# Patient Record
Sex: FEMALE | Race: BLACK OR AFRICAN AMERICAN | NOT HISPANIC OR LATINO | Employment: FULL TIME | URBAN - METROPOLITAN AREA
[De-identification: names, ages, dates, MRNs, and addresses within clinical notes are randomized per-mention and may not be internally consistent; named-entity substitution may affect disease eponyms.]

---

## 2017-02-13 ENCOUNTER — OFFICE VISIT - HEALTHEAST (OUTPATIENT)
Dept: FAMILY MEDICINE | Facility: CLINIC | Age: 57
End: 2017-02-13

## 2017-02-13 DIAGNOSIS — Z01.818 PREOP EXAMINATION: ICD-10-CM

## 2017-02-13 DIAGNOSIS — I10 HTN (HYPERTENSION): ICD-10-CM

## 2017-02-13 ASSESSMENT — MIFFLIN-ST. JEOR: SCORE: 1385.08

## 2017-03-17 ENCOUNTER — COMMUNICATION - HEALTHEAST (OUTPATIENT)
Dept: FAMILY MEDICINE | Facility: CLINIC | Age: 57
End: 2017-03-17

## 2017-05-06 ENCOUNTER — COMMUNICATION - HEALTHEAST (OUTPATIENT)
Dept: FAMILY MEDICINE | Facility: CLINIC | Age: 57
End: 2017-05-06

## 2017-05-06 DIAGNOSIS — I10 HYPERTENSION, ESSENTIAL, BENIGN: ICD-10-CM

## 2017-06-06 ENCOUNTER — OFFICE VISIT - HEALTHEAST (OUTPATIENT)
Dept: FAMILY MEDICINE | Facility: CLINIC | Age: 57
End: 2017-06-06

## 2017-06-06 DIAGNOSIS — S00.452A FOREIGN BODY IN EAR LOBE, LEFT, INITIAL ENCOUNTER: ICD-10-CM

## 2017-06-06 DIAGNOSIS — T65.91XA: ICD-10-CM

## 2017-06-06 DIAGNOSIS — I10 HYPERTENSION, ESSENTIAL, BENIGN: ICD-10-CM

## 2017-06-12 ENCOUNTER — OFFICE VISIT - HEALTHEAST (OUTPATIENT)
Dept: FAMILY MEDICINE | Facility: CLINIC | Age: 57
End: 2017-06-12

## 2017-06-12 DIAGNOSIS — T16.2XXD: ICD-10-CM

## 2017-09-13 ENCOUNTER — COMMUNICATION - HEALTHEAST (OUTPATIENT)
Dept: FAMILY MEDICINE | Facility: CLINIC | Age: 57
End: 2017-09-13

## 2017-09-25 ENCOUNTER — OFFICE VISIT - HEALTHEAST (OUTPATIENT)
Dept: FAMILY MEDICINE | Facility: CLINIC | Age: 57
End: 2017-09-25

## 2017-09-25 DIAGNOSIS — Z12.11 COLON CANCER SCREENING: ICD-10-CM

## 2017-09-25 DIAGNOSIS — F32.A DEPRESSION: ICD-10-CM

## 2017-09-25 DIAGNOSIS — Z23 NEED FOR IMMUNIZATION AGAINST INFLUENZA: ICD-10-CM

## 2017-09-25 DIAGNOSIS — K59.00 CONSTIPATION, UNSPECIFIED CONSTIPATION TYPE: ICD-10-CM

## 2017-10-30 ENCOUNTER — COMMUNICATION - HEALTHEAST (OUTPATIENT)
Dept: FAMILY MEDICINE | Facility: CLINIC | Age: 57
End: 2017-10-30

## 2017-11-14 ENCOUNTER — COMMUNICATION - HEALTHEAST (OUTPATIENT)
Dept: FAMILY MEDICINE | Facility: CLINIC | Age: 57
End: 2017-11-14

## 2017-12-05 ENCOUNTER — RECORDS - HEALTHEAST (OUTPATIENT)
Dept: ADMINISTRATIVE | Facility: OTHER | Age: 57
End: 2017-12-05

## 2017-12-17 ENCOUNTER — RECORDS - HEALTHEAST (OUTPATIENT)
Dept: ADMINISTRATIVE | Facility: OTHER | Age: 57
End: 2017-12-17

## 2018-01-18 ENCOUNTER — RECORDS - HEALTHEAST (OUTPATIENT)
Dept: ADMINISTRATIVE | Facility: OTHER | Age: 58
End: 2018-01-18

## 2018-01-19 ENCOUNTER — OFFICE VISIT - HEALTHEAST (OUTPATIENT)
Dept: FAMILY MEDICINE | Facility: CLINIC | Age: 58
End: 2018-01-19

## 2018-01-19 DIAGNOSIS — K60.2 FISSURE IN ANO: ICD-10-CM

## 2018-01-25 ENCOUNTER — COMMUNICATION - HEALTHEAST (OUTPATIENT)
Dept: FAMILY MEDICINE | Facility: CLINIC | Age: 58
End: 2018-01-25

## 2018-02-06 ENCOUNTER — RECORDS - HEALTHEAST (OUTPATIENT)
Dept: ADMINISTRATIVE | Facility: OTHER | Age: 58
End: 2018-02-06

## 2018-02-20 ENCOUNTER — RECORDS - HEALTHEAST (OUTPATIENT)
Dept: ADMINISTRATIVE | Facility: OTHER | Age: 58
End: 2018-02-20

## 2018-03-07 ENCOUNTER — COMMUNICATION - HEALTHEAST (OUTPATIENT)
Dept: FAMILY MEDICINE | Facility: CLINIC | Age: 58
End: 2018-03-07

## 2018-04-05 ENCOUNTER — COMMUNICATION - HEALTHEAST (OUTPATIENT)
Dept: SCHEDULING | Facility: CLINIC | Age: 58
End: 2018-04-05

## 2018-04-05 ENCOUNTER — OFFICE VISIT - HEALTHEAST (OUTPATIENT)
Dept: FAMILY MEDICINE | Facility: CLINIC | Age: 58
End: 2018-04-05

## 2018-04-05 DIAGNOSIS — F41.9 ANXIETY: ICD-10-CM

## 2018-04-05 DIAGNOSIS — F32.A DEPRESSION: ICD-10-CM

## 2018-04-05 DIAGNOSIS — I10 HYPERTENSION, ESSENTIAL, BENIGN: ICD-10-CM

## 2018-11-27 ENCOUNTER — OFFICE VISIT - HEALTHEAST (OUTPATIENT)
Dept: FAMILY MEDICINE | Facility: CLINIC | Age: 58
End: 2018-11-27

## 2018-11-27 DIAGNOSIS — M25.552 PAIN OF BOTH HIP JOINTS: ICD-10-CM

## 2018-11-27 DIAGNOSIS — M25.551 PAIN OF BOTH HIP JOINTS: ICD-10-CM

## 2018-11-27 DIAGNOSIS — I10 ESSENTIAL HYPERTENSION: ICD-10-CM

## 2018-11-27 DIAGNOSIS — M54.9 CHRONIC MIDLINE BACK PAIN, UNSPECIFIED BACK LOCATION: ICD-10-CM

## 2018-11-27 DIAGNOSIS — E87.6 HYPOKALEMIA: ICD-10-CM

## 2018-11-27 DIAGNOSIS — G89.29 CHRONIC MIDLINE BACK PAIN, UNSPECIFIED BACK LOCATION: ICD-10-CM

## 2018-11-27 DIAGNOSIS — S89.90XA KNEE INJURY, UNSPECIFIED LATERALITY, INITIAL ENCOUNTER: ICD-10-CM

## 2018-11-27 LAB
ANION GAP SERPL CALCULATED.3IONS-SCNC: 7 MMOL/L (ref 5–18)
BUN SERPL-MCNC: 13 MG/DL (ref 8–22)
CALCIUM SERPL-MCNC: 9.3 MG/DL (ref 8.5–10.5)
CHLORIDE BLD-SCNC: 108 MMOL/L (ref 98–107)
CO2 SERPL-SCNC: 26 MMOL/L (ref 22–31)
CREAT SERPL-MCNC: 0.68 MG/DL (ref 0.6–1.1)
GFR SERPL CREATININE-BSD FRML MDRD: >60 ML/MIN/1.73M2
GLUCOSE BLD-MCNC: 80 MG/DL (ref 70–125)
POTASSIUM BLD-SCNC: 3.5 MMOL/L (ref 3.5–5)
SODIUM SERPL-SCNC: 141 MMOL/L (ref 136–145)

## 2018-12-03 ENCOUNTER — RECORDS - HEALTHEAST (OUTPATIENT)
Dept: ADMINISTRATIVE | Facility: OTHER | Age: 58
End: 2018-12-03

## 2018-12-06 ENCOUNTER — OFFICE VISIT - HEALTHEAST (OUTPATIENT)
Dept: PHYSICAL THERAPY | Facility: REHABILITATION | Age: 58
End: 2018-12-06

## 2018-12-06 DIAGNOSIS — M25.552 CHRONIC HIP PAIN, BILATERAL: ICD-10-CM

## 2018-12-06 DIAGNOSIS — M25.551 CHRONIC HIP PAIN, BILATERAL: ICD-10-CM

## 2018-12-06 DIAGNOSIS — G89.29 BILATERAL CHRONIC KNEE PAIN: ICD-10-CM

## 2018-12-06 DIAGNOSIS — M25.562 BILATERAL CHRONIC KNEE PAIN: ICD-10-CM

## 2018-12-06 DIAGNOSIS — M54.50 CHRONIC BILATERAL LOW BACK PAIN WITHOUT SCIATICA: ICD-10-CM

## 2018-12-06 DIAGNOSIS — G89.29 CHRONIC HIP PAIN, BILATERAL: ICD-10-CM

## 2018-12-06 DIAGNOSIS — G89.29 CHRONIC BILATERAL LOW BACK PAIN WITHOUT SCIATICA: ICD-10-CM

## 2018-12-06 DIAGNOSIS — M25.561 BILATERAL CHRONIC KNEE PAIN: ICD-10-CM

## 2018-12-10 ENCOUNTER — OFFICE VISIT - HEALTHEAST (OUTPATIENT)
Dept: PHYSICAL THERAPY | Facility: REHABILITATION | Age: 58
End: 2018-12-10

## 2018-12-10 DIAGNOSIS — M25.551 CHRONIC HIP PAIN, BILATERAL: ICD-10-CM

## 2018-12-10 DIAGNOSIS — G89.29 CHRONIC HIP PAIN, BILATERAL: ICD-10-CM

## 2018-12-10 DIAGNOSIS — G89.29 BILATERAL CHRONIC KNEE PAIN: ICD-10-CM

## 2018-12-10 DIAGNOSIS — M54.50 CHRONIC BILATERAL LOW BACK PAIN WITHOUT SCIATICA: ICD-10-CM

## 2018-12-10 DIAGNOSIS — M25.552 CHRONIC HIP PAIN, BILATERAL: ICD-10-CM

## 2018-12-10 DIAGNOSIS — M25.561 BILATERAL CHRONIC KNEE PAIN: ICD-10-CM

## 2018-12-10 DIAGNOSIS — M25.562 BILATERAL CHRONIC KNEE PAIN: ICD-10-CM

## 2018-12-10 DIAGNOSIS — G89.29 CHRONIC BILATERAL LOW BACK PAIN WITHOUT SCIATICA: ICD-10-CM

## 2018-12-12 ENCOUNTER — OFFICE VISIT - HEALTHEAST (OUTPATIENT)
Dept: PHYSICAL THERAPY | Facility: REHABILITATION | Age: 58
End: 2018-12-12

## 2018-12-12 DIAGNOSIS — G89.29 CHRONIC BILATERAL LOW BACK PAIN WITHOUT SCIATICA: ICD-10-CM

## 2018-12-12 DIAGNOSIS — M25.562 BILATERAL CHRONIC KNEE PAIN: ICD-10-CM

## 2018-12-12 DIAGNOSIS — M54.50 CHRONIC BILATERAL LOW BACK PAIN WITHOUT SCIATICA: ICD-10-CM

## 2018-12-12 DIAGNOSIS — M25.561 BILATERAL CHRONIC KNEE PAIN: ICD-10-CM

## 2018-12-12 DIAGNOSIS — G89.29 BILATERAL CHRONIC KNEE PAIN: ICD-10-CM

## 2018-12-12 DIAGNOSIS — M25.552 CHRONIC HIP PAIN, BILATERAL: ICD-10-CM

## 2018-12-12 DIAGNOSIS — G89.29 CHRONIC HIP PAIN, BILATERAL: ICD-10-CM

## 2018-12-12 DIAGNOSIS — M25.551 CHRONIC HIP PAIN, BILATERAL: ICD-10-CM

## 2018-12-14 ENCOUNTER — HOSPITAL ENCOUNTER (OUTPATIENT)
Dept: MAMMOGRAPHY | Facility: CLINIC | Age: 58
Discharge: HOME | End: 2018-12-14
Attending: FAMILY MEDICINE

## 2018-12-14 DIAGNOSIS — Z12.31 VISIT FOR SCREENING MAMMOGRAM: ICD-10-CM

## 2018-12-17 ENCOUNTER — OFFICE VISIT - HEALTHEAST (OUTPATIENT)
Dept: PHYSICAL THERAPY | Facility: REHABILITATION | Age: 58
End: 2018-12-17

## 2018-12-17 DIAGNOSIS — M25.562 BILATERAL CHRONIC KNEE PAIN: ICD-10-CM

## 2018-12-17 DIAGNOSIS — M54.50 CHRONIC BILATERAL LOW BACK PAIN WITHOUT SCIATICA: ICD-10-CM

## 2018-12-17 DIAGNOSIS — G89.29 CHRONIC HIP PAIN, BILATERAL: ICD-10-CM

## 2018-12-17 DIAGNOSIS — M25.551 CHRONIC HIP PAIN, BILATERAL: ICD-10-CM

## 2018-12-17 DIAGNOSIS — M25.552 CHRONIC HIP PAIN, BILATERAL: ICD-10-CM

## 2018-12-17 DIAGNOSIS — G89.29 CHRONIC BILATERAL LOW BACK PAIN WITHOUT SCIATICA: ICD-10-CM

## 2018-12-17 DIAGNOSIS — M25.561 BILATERAL CHRONIC KNEE PAIN: ICD-10-CM

## 2018-12-17 DIAGNOSIS — G89.29 BILATERAL CHRONIC KNEE PAIN: ICD-10-CM

## 2018-12-24 ENCOUNTER — OFFICE VISIT - HEALTHEAST (OUTPATIENT)
Dept: PHYSICAL THERAPY | Facility: REHABILITATION | Age: 58
End: 2018-12-24

## 2018-12-24 DIAGNOSIS — M25.562 BILATERAL CHRONIC KNEE PAIN: ICD-10-CM

## 2018-12-24 DIAGNOSIS — G89.29 CHRONIC BILATERAL LOW BACK PAIN WITHOUT SCIATICA: ICD-10-CM

## 2018-12-24 DIAGNOSIS — M25.551 CHRONIC HIP PAIN, BILATERAL: ICD-10-CM

## 2018-12-24 DIAGNOSIS — M25.552 CHRONIC HIP PAIN, BILATERAL: ICD-10-CM

## 2018-12-24 DIAGNOSIS — G89.29 BILATERAL CHRONIC KNEE PAIN: ICD-10-CM

## 2018-12-24 DIAGNOSIS — M25.561 BILATERAL CHRONIC KNEE PAIN: ICD-10-CM

## 2018-12-24 DIAGNOSIS — G89.29 CHRONIC HIP PAIN, BILATERAL: ICD-10-CM

## 2018-12-24 DIAGNOSIS — M54.50 CHRONIC BILATERAL LOW BACK PAIN WITHOUT SCIATICA: ICD-10-CM

## 2018-12-27 ENCOUNTER — OFFICE VISIT - HEALTHEAST (OUTPATIENT)
Dept: PHYSICAL THERAPY | Facility: REHABILITATION | Age: 58
End: 2018-12-27

## 2018-12-27 DIAGNOSIS — M25.561 BILATERAL CHRONIC KNEE PAIN: ICD-10-CM

## 2018-12-27 DIAGNOSIS — G89.29 CHRONIC HIP PAIN, BILATERAL: ICD-10-CM

## 2018-12-27 DIAGNOSIS — M25.551 CHRONIC HIP PAIN, BILATERAL: ICD-10-CM

## 2018-12-27 DIAGNOSIS — M25.562 BILATERAL CHRONIC KNEE PAIN: ICD-10-CM

## 2018-12-27 DIAGNOSIS — G89.29 CHRONIC BILATERAL LOW BACK PAIN WITHOUT SCIATICA: ICD-10-CM

## 2018-12-27 DIAGNOSIS — M25.552 CHRONIC HIP PAIN, BILATERAL: ICD-10-CM

## 2018-12-27 DIAGNOSIS — M54.50 CHRONIC BILATERAL LOW BACK PAIN WITHOUT SCIATICA: ICD-10-CM

## 2018-12-27 DIAGNOSIS — G89.29 BILATERAL CHRONIC KNEE PAIN: ICD-10-CM

## 2018-12-31 ENCOUNTER — OFFICE VISIT - HEALTHEAST (OUTPATIENT)
Dept: PHYSICAL THERAPY | Facility: REHABILITATION | Age: 58
End: 2018-12-31

## 2018-12-31 DIAGNOSIS — M25.561 BILATERAL CHRONIC KNEE PAIN: ICD-10-CM

## 2018-12-31 DIAGNOSIS — M25.562 BILATERAL CHRONIC KNEE PAIN: ICD-10-CM

## 2018-12-31 DIAGNOSIS — G89.29 BILATERAL CHRONIC KNEE PAIN: ICD-10-CM

## 2018-12-31 DIAGNOSIS — M25.551 CHRONIC HIP PAIN, BILATERAL: ICD-10-CM

## 2018-12-31 DIAGNOSIS — G89.29 CHRONIC HIP PAIN, BILATERAL: ICD-10-CM

## 2018-12-31 DIAGNOSIS — G89.29 CHRONIC BILATERAL LOW BACK PAIN WITHOUT SCIATICA: ICD-10-CM

## 2018-12-31 DIAGNOSIS — M25.552 CHRONIC HIP PAIN, BILATERAL: ICD-10-CM

## 2018-12-31 DIAGNOSIS — M54.50 CHRONIC BILATERAL LOW BACK PAIN WITHOUT SCIATICA: ICD-10-CM

## 2019-01-02 ENCOUNTER — OFFICE VISIT - HEALTHEAST (OUTPATIENT)
Dept: PHYSICAL THERAPY | Facility: REHABILITATION | Age: 59
End: 2019-01-02

## 2019-01-02 DIAGNOSIS — M54.50 CHRONIC BILATERAL LOW BACK PAIN WITHOUT SCIATICA: ICD-10-CM

## 2019-01-02 DIAGNOSIS — G89.29 CHRONIC HIP PAIN, BILATERAL: ICD-10-CM

## 2019-01-02 DIAGNOSIS — M25.561 BILATERAL CHRONIC KNEE PAIN: ICD-10-CM

## 2019-01-02 DIAGNOSIS — M25.551 CHRONIC HIP PAIN, BILATERAL: ICD-10-CM

## 2019-01-02 DIAGNOSIS — G89.29 BILATERAL CHRONIC KNEE PAIN: ICD-10-CM

## 2019-01-02 DIAGNOSIS — M25.562 BILATERAL CHRONIC KNEE PAIN: ICD-10-CM

## 2019-01-02 DIAGNOSIS — G89.29 CHRONIC BILATERAL LOW BACK PAIN WITHOUT SCIATICA: ICD-10-CM

## 2019-01-02 DIAGNOSIS — M25.552 CHRONIC HIP PAIN, BILATERAL: ICD-10-CM

## 2019-01-07 ENCOUNTER — OFFICE VISIT - HEALTHEAST (OUTPATIENT)
Dept: PHYSICAL THERAPY | Facility: REHABILITATION | Age: 59
End: 2019-01-07

## 2019-01-07 DIAGNOSIS — M25.562 BILATERAL CHRONIC KNEE PAIN: ICD-10-CM

## 2019-01-07 DIAGNOSIS — G89.29 BILATERAL CHRONIC KNEE PAIN: ICD-10-CM

## 2019-01-07 DIAGNOSIS — M25.561 BILATERAL CHRONIC KNEE PAIN: ICD-10-CM

## 2019-01-07 DIAGNOSIS — G89.29 CHRONIC BILATERAL LOW BACK PAIN WITHOUT SCIATICA: ICD-10-CM

## 2019-01-07 DIAGNOSIS — M54.50 CHRONIC BILATERAL LOW BACK PAIN WITHOUT SCIATICA: ICD-10-CM

## 2019-01-07 DIAGNOSIS — G89.29 CHRONIC HIP PAIN, BILATERAL: ICD-10-CM

## 2019-01-07 DIAGNOSIS — M25.552 CHRONIC HIP PAIN, BILATERAL: ICD-10-CM

## 2019-01-07 DIAGNOSIS — M25.551 CHRONIC HIP PAIN, BILATERAL: ICD-10-CM

## 2019-01-08 ENCOUNTER — COMMUNICATION - HEALTHEAST (OUTPATIENT)
Dept: FAMILY MEDICINE | Facility: CLINIC | Age: 59
End: 2019-01-08

## 2019-01-08 DIAGNOSIS — F41.9 ANXIETY: ICD-10-CM

## 2019-01-08 DIAGNOSIS — F32.A DEPRESSION: ICD-10-CM

## 2019-01-09 ENCOUNTER — OFFICE VISIT - HEALTHEAST (OUTPATIENT)
Dept: PHYSICAL THERAPY | Facility: REHABILITATION | Age: 59
End: 2019-01-09

## 2019-01-09 DIAGNOSIS — M54.50 CHRONIC BILATERAL LOW BACK PAIN WITHOUT SCIATICA: ICD-10-CM

## 2019-01-09 DIAGNOSIS — G89.29 CHRONIC BILATERAL LOW BACK PAIN WITHOUT SCIATICA: ICD-10-CM

## 2019-01-09 DIAGNOSIS — M25.551 CHRONIC HIP PAIN, BILATERAL: ICD-10-CM

## 2019-01-09 DIAGNOSIS — G89.29 BILATERAL CHRONIC KNEE PAIN: ICD-10-CM

## 2019-01-09 DIAGNOSIS — G89.29 CHRONIC HIP PAIN, BILATERAL: ICD-10-CM

## 2019-01-09 DIAGNOSIS — M25.552 CHRONIC HIP PAIN, BILATERAL: ICD-10-CM

## 2019-01-09 DIAGNOSIS — M25.561 BILATERAL CHRONIC KNEE PAIN: ICD-10-CM

## 2019-01-09 DIAGNOSIS — M25.562 BILATERAL CHRONIC KNEE PAIN: ICD-10-CM

## 2019-01-16 ENCOUNTER — COMMUNICATION - HEALTHEAST (OUTPATIENT)
Dept: FAMILY MEDICINE | Facility: CLINIC | Age: 59
End: 2019-01-16

## 2019-01-16 DIAGNOSIS — F41.9 ANXIETY: ICD-10-CM

## 2019-01-16 DIAGNOSIS — F32.A DEPRESSION: ICD-10-CM

## 2019-01-21 ENCOUNTER — RECORDS - HEALTHEAST (OUTPATIENT)
Dept: ADMINISTRATIVE | Facility: OTHER | Age: 59
End: 2019-01-21

## 2019-01-23 ENCOUNTER — OFFICE VISIT - HEALTHEAST (OUTPATIENT)
Dept: PHYSICAL THERAPY | Facility: REHABILITATION | Age: 59
End: 2019-01-23

## 2019-01-23 DIAGNOSIS — M54.50 CHRONIC BILATERAL LOW BACK PAIN WITHOUT SCIATICA: ICD-10-CM

## 2019-01-23 DIAGNOSIS — M25.551 CHRONIC HIP PAIN, BILATERAL: ICD-10-CM

## 2019-01-23 DIAGNOSIS — G89.29 CHRONIC HIP PAIN, BILATERAL: ICD-10-CM

## 2019-01-23 DIAGNOSIS — M25.562 BILATERAL CHRONIC KNEE PAIN: ICD-10-CM

## 2019-01-23 DIAGNOSIS — M25.552 CHRONIC HIP PAIN, BILATERAL: ICD-10-CM

## 2019-01-23 DIAGNOSIS — G89.29 BILATERAL CHRONIC KNEE PAIN: ICD-10-CM

## 2019-01-23 DIAGNOSIS — M25.561 BILATERAL CHRONIC KNEE PAIN: ICD-10-CM

## 2019-01-23 DIAGNOSIS — G89.29 CHRONIC BILATERAL LOW BACK PAIN WITHOUT SCIATICA: ICD-10-CM

## 2019-02-21 ENCOUNTER — COMMUNICATION - HEALTHEAST (OUTPATIENT)
Dept: FAMILY MEDICINE | Facility: CLINIC | Age: 59
End: 2019-02-21

## 2019-02-21 ENCOUNTER — RECORDS - HEALTHEAST (OUTPATIENT)
Dept: ADMINISTRATIVE | Facility: OTHER | Age: 59
End: 2019-02-21

## 2019-02-21 DIAGNOSIS — I10 HYPERTENSION, ESSENTIAL, BENIGN: ICD-10-CM

## 2019-03-14 ENCOUNTER — OFFICE VISIT - HEALTHEAST (OUTPATIENT)
Dept: FAMILY MEDICINE | Facility: CLINIC | Age: 59
End: 2019-03-14

## 2019-03-14 DIAGNOSIS — J01.10 ACUTE NON-RECURRENT FRONTAL SINUSITIS: ICD-10-CM

## 2019-03-22 ENCOUNTER — RECORDS - HEALTHEAST (OUTPATIENT)
Dept: ADMINISTRATIVE | Facility: OTHER | Age: 59
End: 2019-03-22

## 2019-04-10 ENCOUNTER — RECORDS - HEALTHEAST (OUTPATIENT)
Dept: ADMINISTRATIVE | Facility: OTHER | Age: 59
End: 2019-04-10

## 2019-04-16 ENCOUNTER — RECORDS - HEALTHEAST (OUTPATIENT)
Dept: ADMINISTRATIVE | Facility: OTHER | Age: 59
End: 2019-04-16

## 2019-04-29 ENCOUNTER — OFFICE VISIT - HEALTHEAST (OUTPATIENT)
Dept: FAMILY MEDICINE | Facility: CLINIC | Age: 59
End: 2019-04-29

## 2019-04-29 DIAGNOSIS — F41.9 ANXIETY: ICD-10-CM

## 2019-04-29 DIAGNOSIS — L50.9 HIVES OF UNKNOWN ORIGIN: ICD-10-CM

## 2019-05-06 ENCOUNTER — COMMUNICATION - HEALTHEAST (OUTPATIENT)
Dept: SCHEDULING | Facility: CLINIC | Age: 59
End: 2019-05-06

## 2019-05-06 DIAGNOSIS — L29.9 ITCHING: ICD-10-CM

## 2019-05-16 ENCOUNTER — COMMUNICATION - HEALTHEAST (OUTPATIENT)
Dept: SCHEDULING | Facility: CLINIC | Age: 59
End: 2019-05-16

## 2019-05-20 ENCOUNTER — OFFICE VISIT - HEALTHEAST (OUTPATIENT)
Dept: ALLERGY | Facility: CLINIC | Age: 59
End: 2019-05-20

## 2019-05-20 DIAGNOSIS — L50.8 CHRONIC URTICARIA: ICD-10-CM

## 2019-05-20 LAB
ALT SERPL W P-5'-P-CCNC: 14 U/L (ref 0–45)
AST SERPL W P-5'-P-CCNC: 17 U/L (ref 0–40)
BASOPHILS # BLD AUTO: 0 THOU/UL (ref 0–0.2)
BASOPHILS NFR BLD AUTO: 1 % (ref 0–2)
CREAT SERPL-MCNC: 0.76 MG/DL (ref 0.6–1.1)
EOSINOPHIL # BLD AUTO: 0.2 THOU/UL (ref 0–0.4)
EOSINOPHIL NFR BLD AUTO: 3 % (ref 0–6)
ERYTHROCYTE [DISTWIDTH] IN BLOOD BY AUTOMATED COUNT: 13.5 % (ref 11–14.5)
GFR SERPL CREATININE-BSD FRML MDRD: >60 ML/MIN/1.73M2
HCT VFR BLD AUTO: 37.5 % (ref 35–47)
HGB BLD-MCNC: 12.6 G/DL (ref 12–16)
LYMPHOCYTES # BLD AUTO: 2.8 THOU/UL (ref 0.8–4.4)
LYMPHOCYTES NFR BLD AUTO: 40 % (ref 20–40)
MCH RBC QN AUTO: 30.9 PG (ref 27–34)
MCHC RBC AUTO-ENTMCNC: 33.6 G/DL (ref 32–36)
MCV RBC AUTO: 92 FL (ref 80–100)
MONOCYTES # BLD AUTO: 0.4 THOU/UL (ref 0–0.9)
MONOCYTES NFR BLD AUTO: 5 % (ref 2–10)
NEUTROPHILS # BLD AUTO: 3.5 THOU/UL (ref 2–7.7)
NEUTROPHILS NFR BLD AUTO: 51 % (ref 50–70)
PLATELET # BLD AUTO: 208 THOU/UL (ref 140–440)
PMV BLD AUTO: 6.9 FL (ref 7–10)
RBC # BLD AUTO: 4.08 MILL/UL (ref 3.8–5.4)
TSH SERPL DL<=0.005 MIU/L-ACNC: 2.22 UIU/ML (ref 0.3–5)
WBC: 6.9 THOU/UL (ref 4–11)

## 2019-05-20 ASSESSMENT — MIFFLIN-ST. JEOR: SCORE: 1447.56

## 2019-05-21 ENCOUNTER — AMBULATORY - HEALTHEAST (OUTPATIENT)
Dept: ALLERGY | Facility: CLINIC | Age: 59
End: 2019-05-21

## 2019-05-21 DIAGNOSIS — E55.9 VITAMIN D DEFICIENCY: ICD-10-CM

## 2019-05-21 LAB — 25(OH)D3 SERPL-MCNC: 11.4 NG/ML (ref 30–80)

## 2019-09-17 ENCOUNTER — OFFICE VISIT - HEALTHEAST (OUTPATIENT)
Dept: FAMILY MEDICINE | Facility: CLINIC | Age: 59
End: 2019-09-17

## 2019-09-17 DIAGNOSIS — R05.9 COUGH: ICD-10-CM

## 2019-09-17 DIAGNOSIS — J01.91 ACUTE RECURRENT SINUSITIS, UNSPECIFIED LOCATION: ICD-10-CM

## 2019-10-07 ENCOUNTER — COMMUNICATION - HEALTHEAST (OUTPATIENT)
Dept: FAMILY MEDICINE | Facility: CLINIC | Age: 59
End: 2019-10-07

## 2019-12-03 ENCOUNTER — OFFICE VISIT - HEALTHEAST (OUTPATIENT)
Dept: FAMILY MEDICINE | Facility: CLINIC | Age: 59
End: 2019-12-03

## 2019-12-03 DIAGNOSIS — E55.9 VITAMIN D DEFICIENCY: ICD-10-CM

## 2019-12-03 DIAGNOSIS — I10 HYPERTENSION, ESSENTIAL, BENIGN: ICD-10-CM

## 2019-12-03 DIAGNOSIS — Z23 NEED FOR INFLUENZA VACCINATION: ICD-10-CM

## 2019-12-03 LAB
ANION GAP SERPL CALCULATED.3IONS-SCNC: 9 MMOL/L (ref 5–18)
BUN SERPL-MCNC: 15 MG/DL (ref 8–22)
CALCIUM SERPL-MCNC: 9.6 MG/DL (ref 8.5–10.5)
CHLORIDE BLD-SCNC: 107 MMOL/L (ref 98–107)
CO2 SERPL-SCNC: 27 MMOL/L (ref 22–31)
CREAT SERPL-MCNC: 0.81 MG/DL (ref 0.6–1.1)
GFR SERPL CREATININE-BSD FRML MDRD: >60 ML/MIN/1.73M2
GLUCOSE BLD-MCNC: 82 MG/DL (ref 70–125)
POTASSIUM BLD-SCNC: 3.6 MMOL/L (ref 3.5–5)
SODIUM SERPL-SCNC: 143 MMOL/L (ref 136–145)

## 2019-12-03 ASSESSMENT — PATIENT HEALTH QUESTIONNAIRE - PHQ9: SUM OF ALL RESPONSES TO PHQ QUESTIONS 1-9: 0

## 2020-02-03 ENCOUNTER — COMMUNICATION - HEALTHEAST (OUTPATIENT)
Dept: FAMILY MEDICINE | Facility: CLINIC | Age: 60
End: 2020-02-03

## 2020-02-03 DIAGNOSIS — I10 HYPERTENSION, ESSENTIAL, BENIGN: ICD-10-CM

## 2020-02-04 ENCOUNTER — COMMUNICATION - HEALTHEAST (OUTPATIENT)
Dept: FAMILY MEDICINE | Facility: CLINIC | Age: 60
End: 2020-02-04

## 2020-02-24 ENCOUNTER — OFFICE VISIT - HEALTHEAST (OUTPATIENT)
Dept: FAMILY MEDICINE | Facility: CLINIC | Age: 60
End: 2020-02-24

## 2020-02-24 DIAGNOSIS — R05.9 COUGH: ICD-10-CM

## 2020-02-24 DIAGNOSIS — R35.0 URINARY FREQUENCY: ICD-10-CM

## 2020-02-24 DIAGNOSIS — R09.82 POST-NASAL DRAINAGE: ICD-10-CM

## 2020-02-24 LAB
ALBUMIN UR-MCNC: NEGATIVE MG/DL
APPEARANCE UR: CLEAR
BILIRUB UR QL STRIP: NEGATIVE
COLOR UR AUTO: YELLOW
GLUCOSE UR STRIP-MCNC: NEGATIVE MG/DL
HGB UR QL STRIP: NEGATIVE
KETONES UR STRIP-MCNC: NEGATIVE MG/DL
LEUKOCYTE ESTERASE UR QL STRIP: NEGATIVE
NITRATE UR QL: NEGATIVE
PH UR STRIP: 5 [PH] (ref 5–8)
SP GR UR STRIP: 1.02 (ref 1–1.03)
UROBILINOGEN UR STRIP-ACNC: NORMAL

## 2020-11-13 ENCOUNTER — OFFICE VISIT - HEALTHEAST (OUTPATIENT)
Dept: FAMILY MEDICINE | Facility: CLINIC | Age: 60
End: 2020-11-13

## 2020-11-13 DIAGNOSIS — S76.011A STRAIN OF HIP AND THIGH, RIGHT, INITIAL ENCOUNTER: ICD-10-CM

## 2020-11-13 DIAGNOSIS — I10 HYPERTENSION, ESSENTIAL, BENIGN: ICD-10-CM

## 2020-11-13 DIAGNOSIS — S76.911A STRAIN OF HIP AND THIGH, RIGHT, INITIAL ENCOUNTER: ICD-10-CM

## 2020-11-13 RX ORDER — ATENOLOL AND CHLORTHALIDONE TABLET 100; 25 MG/1; MG/1
1 TABLET ORAL DAILY
Qty: 90 TABLET | Refills: 3 | Status: SHIPPED | OUTPATIENT
Start: 2020-11-13 | End: 2021-10-12

## 2020-11-13 RX ORDER — CYCLOBENZAPRINE HCL 10 MG
10 TABLET ORAL
Qty: 30 TABLET | Refills: 1 | Status: SHIPPED | OUTPATIENT
Start: 2020-11-13 | End: 2022-10-18

## 2020-11-24 ENCOUNTER — OFFICE VISIT - HEALTHEAST (OUTPATIENT)
Dept: FAMILY MEDICINE | Facility: CLINIC | Age: 60
End: 2020-11-24

## 2020-11-24 DIAGNOSIS — J34.89 RHINORRHEA: ICD-10-CM

## 2020-11-25 ENCOUNTER — AMBULATORY - HEALTHEAST (OUTPATIENT)
Dept: FAMILY MEDICINE | Facility: CLINIC | Age: 60
End: 2020-11-25

## 2020-11-30 ENCOUNTER — AMBULATORY - HEALTHEAST (OUTPATIENT)
Dept: CARE COORDINATION | Facility: CLINIC | Age: 60
End: 2020-11-30

## 2020-11-30 ENCOUNTER — COMMUNICATION - HEALTHEAST (OUTPATIENT)
Dept: NURSING | Facility: CLINIC | Age: 60
End: 2020-11-30

## 2020-11-30 DIAGNOSIS — U07.1 2019 NOVEL CORONAVIRUS DISEASE (COVID-19): ICD-10-CM

## 2020-12-09 ENCOUNTER — COMMUNICATION - HEALTHEAST (OUTPATIENT)
Dept: FAMILY MEDICINE | Facility: CLINIC | Age: 60
End: 2020-12-09

## 2020-12-11 ENCOUNTER — OFFICE VISIT - HEALTHEAST (OUTPATIENT)
Dept: FAMILY MEDICINE | Facility: CLINIC | Age: 60
End: 2020-12-11

## 2020-12-11 DIAGNOSIS — U07.1 PNEUMONIA DUE TO 2019 NOVEL CORONAVIRUS: ICD-10-CM

## 2020-12-11 DIAGNOSIS — J12.82 PNEUMONIA DUE TO 2019 NOVEL CORONAVIRUS: ICD-10-CM

## 2020-12-11 DIAGNOSIS — F32.0 MILD MAJOR DEPRESSION (H): ICD-10-CM

## 2020-12-11 DIAGNOSIS — R05.9 COUGH: ICD-10-CM

## 2020-12-11 RX ORDER — ALBUTEROL SULFATE 90 UG/1
2 AEROSOL, METERED RESPIRATORY (INHALATION) EVERY 6 HOURS PRN
Qty: 2 INHALER | Refills: 1 | Status: SHIPPED | OUTPATIENT
Start: 2020-12-11 | End: 2022-10-18

## 2020-12-11 ASSESSMENT — MIFFLIN-ST. JEOR: SCORE: 1531.58

## 2020-12-11 ASSESSMENT — PATIENT HEALTH QUESTIONNAIRE - PHQ9: SUM OF ALL RESPONSES TO PHQ QUESTIONS 1-9: 10

## 2021-02-03 ENCOUNTER — OFFICE VISIT - HEALTHEAST (OUTPATIENT)
Dept: FAMILY MEDICINE | Facility: CLINIC | Age: 61
End: 2021-02-03

## 2021-02-03 DIAGNOSIS — J32.9 SINUSITIS, UNSPECIFIED CHRONICITY, UNSPECIFIED LOCATION: ICD-10-CM

## 2021-02-03 DIAGNOSIS — T50.Z95A ADVERSE EFFECT OF VACCINE, INITIAL ENCOUNTER: ICD-10-CM

## 2021-02-12 ENCOUNTER — COMMUNICATION - HEALTHEAST (OUTPATIENT)
Dept: SCHEDULING | Facility: CLINIC | Age: 61
End: 2021-02-12

## 2021-02-12 ENCOUNTER — OFFICE VISIT - HEALTHEAST (OUTPATIENT)
Dept: FAMILY MEDICINE | Facility: CLINIC | Age: 61
End: 2021-02-12

## 2021-02-12 DIAGNOSIS — F32.0 MILD MAJOR DEPRESSION (H): ICD-10-CM

## 2021-02-12 DIAGNOSIS — J01.00 SUBACUTE MAXILLARY SINUSITIS: ICD-10-CM

## 2021-02-12 DIAGNOSIS — J02.9 SORE THROAT: ICD-10-CM

## 2021-02-12 RX ORDER — ACETAMINOPHEN 325 MG/1
650 TABLET ORAL EVERY 6 HOURS PRN
Status: SHIPPED | COMMUNITY
Start: 2021-02-12 | End: 2022-10-18

## 2021-02-12 RX ORDER — IBUPROFEN 200 MG
200 TABLET ORAL EVERY 6 HOURS PRN
Status: SHIPPED | COMMUNITY
Start: 2021-02-12 | End: 2022-10-18

## 2021-05-26 ASSESSMENT — PATIENT HEALTH QUESTIONNAIRE - PHQ9: SUM OF ALL RESPONSES TO PHQ QUESTIONS 1-9: 0

## 2021-05-27 ASSESSMENT — PATIENT HEALTH QUESTIONNAIRE - PHQ9: SUM OF ALL RESPONSES TO PHQ QUESTIONS 1-9: 10

## 2021-05-28 NOTE — PROGRESS NOTES
ASSESSMENT:  1. Hives of unknown origin  - Ambulatory referral to Allergy  - ranitidine (ZANTAC) 150 MG tablet; Take 1 tablet (150 mg total) by mouth 2 (two) times a day.  Dispense: 10 tablet; Refill: 0  - predniSONE (DELTASONE) 20 MG tablet; Take 40 mg by mouth daily.  Dispense: 10 tablet; Refill: 0  Since she is still having the hives especially if she does not take any of her medications, we are going to add medications as noted above.  She was given prednisone for 3 days at 20 mg daily going to increase that and have her take it for 5 days.  She will also take the ranitidine and fexofenadine with Benadryl.  She was also referred to see the allergist especially when she is done taking the medications prescribed.  2. Anxiety  - citalopram (CELEXA) 40 MG tablet; Take 1 tablet (40 mg total) by mouth daily.  Dispense: 90 tablet; Refill: 2  She is doing well regarding her anxiety and depression continues to be on citalopram which has been effective.  She needs a refill which was done today.    PLAN:  Was seen in follow-up in about a month following her evaluation.    Orders Placed This Encounter   Procedures     Ambulatory referral to Allergy     Referral Priority:   Routine     Referral Type:   Allergy, Asthma, and Immunology     Referral Reason:   Evaluation and Treatment     Requested Specialty:   Allergy     Number of Visits Requested:   1     Medications Discontinued During This Encounter   Medication Reason     citalopram (CELEXA) 40 MG tablet Reorder       No follow-ups on file.      CHIEF COMPLAINT:  Chief Complaint   Patient presents with     Urticaria     x 2 months / was seen iin ER p;rior / itches        HISTORY OF PRESENT ILLNESS:  Vanessa is a 58 y.o. female presenting to the clinic today with a two-month history of having had skin rash that was diagnosed to be allergic reaction/hives.  She was seen at the emergency room for sudden onset of skin rash that has been very itchy.  She was seen and evaluated  and had a diagnosis of hives.  She is not really sure whether have is coming from.  This had started about 2 months or so ago following the ingestion of food containing asparagus.  Since then she has been having intermittent hives which appears to be worse when she is not taking allergy medications.  She was treated with prednisone as well as fexofenadine and she has been taking Allegra.  She did have the same kind of hives in 2017, again at that time there is no specific cause that was determined.  She does not have any history of seasonal allergy, she noted that she has not changed anything in her home except for the recent changes all of her beddings since she started having the HIVES.  She does have anxiety and depression which she is doing well with this point.  Needs to have a refill of medication.  REVIEW OF SYSTEMS:   She notes no cough no sneezing, she does not have any known runny nose.  Denies any lesions to the oropharynx.  No chest pain no shortness of breath.  No wheezing.  No oropharyngeal swellings.  Denied any nausea vomiting, no abdominal pain no diarrhea.  No swelling to lower joints extremities.  No other family member is having the same issues.  All other systems are negative.    PFSH:  Reviewed, as below.    Social History     Tobacco Use   Smoking Status Never Smoker   Smokeless Tobacco Never Used       Family History   Problem Relation Age of Onset     Dementia Mother      Breast cancer Mother      Cancer Father         Lung       Social History     Socioeconomic History     Marital status:      Spouse name: Not on file     Number of children: Not on file     Years of education: Not on file     Highest education level: Not on file   Occupational History     Not on file   Social Needs     Financial resource strain: Not on file     Food insecurity:     Worry: Not on file     Inability: Not on file     Transportation needs:     Medical: Not on file     Non-medical: Not on file   Tobacco  Use     Smoking status: Never Smoker     Smokeless tobacco: Never Used   Substance and Sexual Activity     Alcohol use: Yes     Alcohol/week: 0.6 oz     Types: 1 Glasses of wine per week     Comment: Yearly     Drug use: No     Sexual activity: Not on file   Lifestyle     Physical activity:     Days per week: Not on file     Minutes per session: Not on file     Stress: Not on file   Relationships     Social connections:     Talks on phone: Not on file     Gets together: Not on file     Attends Scientologist service: Not on file     Active member of club or organization: Not on file     Attends meetings of clubs or organizations: Not on file     Relationship status: Not on file     Intimate partner violence:     Fear of current or ex partner: Not on file     Emotionally abused: Not on file     Physically abused: Not on file     Forced sexual activity: Not on file   Other Topics Concern     Not on file   Social History Narrative     Not on file       Past Surgical History:   Procedure Laterality Date     ANKLE FRACTURE SURGERY       BREAST SURGERY        SECTION, CLASSIC       HYSTERECTOMY       REDUCTION MAMMAPLASTY Bilateral        Allergies   Allergen Reactions     Morphine      Sulfa (Sulfonamide Antibiotics)        Active Ambulatory Problems     Diagnosis Date Noted     No Active Ambulatory Problems     Resolved Ambulatory Problems     Diagnosis Date Noted     No Resolved Ambulatory Problems     Past Medical History:   Diagnosis Date     Anxiety      Environmental allergies      Hypertension        Current Outpatient Medications   Medication Sig Dispense Refill     albuterol (PROVENTIL HFA) 90 mcg/actuation inhaler Inhale 2 puffs every 6 (six) hours as needed for wheezing. 1 Inhaler 0     atenolol-chlorthalidone (TENORETIC) 100-25 mg per tablet TAKE 1 TABLET BY MOUTH ONCE DAILY 90 tablet 2     citalopram (CELEXA) 40 MG tablet Take 1 tablet (40 mg total) by mouth daily. 90 tablet 2     fexofenadine  (ALLEGRA) 180 MG tablet Take 180 mg by mouth daily.       fluticasone (FLONASE) 50 mcg/actuation nasal spray 1 spray into each nostril daily.       predniSONE (DELTASONE) 20 MG tablet Take 40 mg by mouth daily. 10 tablet 0     ranitidine (ZANTAC) 150 MG tablet Take 1 tablet (150 mg total) by mouth 2 (two) times a day. 10 tablet 0     vitamin E 400 UNIT capsule Take 400 Units by mouth daily.       No current facility-administered medications for this visit.        VITALS:  Vitals:    04/29/19 1640   BP: 136/82   Pulse: 64   Resp: 16   Weight: 189 lb 2 oz (85.8 kg)     Wt Readings from Last 3 Encounters:   04/29/19 189 lb 2 oz (85.8 kg)   03/14/19 187 lb 5 oz (85 kg)   11/27/18 188 lb 5 oz (85.4 kg)     Body mass index is 30.07 kg/m .    PHYSICAL EXAM:  General Appearance: Alert, cooperative, no distress, appears stated age  HEENT: Pupils are equal and reactive, extraocular motions is normal.  Oropharynx is moist.  Neck is supple no notable thyromegaly.  External ears are normal.  Lungs: Clear to auscultation bilaterally, respirations unlabored  Heart: Regular rate and rhythm, S1 and S2 normal, no murmur, rub, or gallop  Abdomen: Soft  Musculoskeletal: Normal range of motion. No joint swelling or deformity.   Neurologic:  Alert and oriented times 3. Cranial nerves II-XII intact.   Psychiatric: Normal mood and affect.    MEDICATIONS:  Current Outpatient Medications   Medication Sig Dispense Refill     albuterol (PROVENTIL HFA) 90 mcg/actuation inhaler Inhale 2 puffs every 6 (six) hours as needed for wheezing. 1 Inhaler 0     atenolol-chlorthalidone (TENORETIC) 100-25 mg per tablet TAKE 1 TABLET BY MOUTH ONCE DAILY 90 tablet 2     citalopram (CELEXA) 40 MG tablet Take 1 tablet (40 mg total) by mouth daily. 90 tablet 2     fexofenadine (ALLEGRA) 180 MG tablet Take 180 mg by mouth daily.       fluticasone (FLONASE) 50 mcg/actuation nasal spray 1 spray into each nostril daily.       predniSONE (DELTASONE) 20 MG tablet Take  40 mg by mouth daily. 10 tablet 0     ranitidine (ZANTAC) 150 MG tablet Take 1 tablet (150 mg total) by mouth 2 (two) times a day. 10 tablet 0     vitamin E 400 UNIT capsule Take 400 Units by mouth daily.       No current facility-administered medications for this visit.

## 2021-05-28 NOTE — TELEPHONE ENCOUNTER
No further triage needed.  Clinic pool to kindly contact pt per Dr Olivares's instruction to facilitate scheduling.  Thank you-

## 2021-05-28 NOTE — TELEPHONE ENCOUNTER
"Pt had OV for widespread urticaria 4/29/19.  Has been taking all meds as instructed:  -> prednisone + ranitidine + fexofenadine and Benadryl.    Widespread hives recurred yesterday (while still completing pred regimen and still taking other meds).  Hives occurred on:  - stomach, thighs, legs, tops of feet.  Took Benadryl last evening.  Hives better today.    Pt does have f/u appt with [facility] 5/20/[facility] -> this is the earliest appt available.  Pt asks what to do in the interim?  More prednisone?  Other advice?    Please advise; thank you kindly.  Detailed voicemail message okay.   Pharmacy is verified in chart.    Kelly De Jesus RN BSBA  [facility] RN Triage     Reason for Disposition    Hives have become worse and taking oral steroids (e.g., prednisone) > 24 hours     Pt describes \"temporary worsening-flare while on meds\"    MODERATE-SEVERE hives persist (i.e., hives interfere with normal activities or work) and taking antihistamine (e.g., Benadryl, Claritin) > 24 hours    Protocols used: HIVES-[facility]      "

## 2021-05-28 NOTE — PATIENT INSTRUCTIONS - [facility]
Allegra 1 tab twice daily     If not better in 1 week, increase to 2 tabs twice daily     Once controlled, every 2-4 weeks reassess    Notify if not controlled    Hydroxyzine or Benadryl only as needed    Watch triggers    85% of patients do resolve within 1-2 years

## 2021-05-28 NOTE — TELEPHONE ENCOUNTER
"Patient calling stating \"I'm supposed to go to an allergist Monday and the , I believe he said to go 2 days without taking my allergy medications\"    Patient will call allergist with whom she has an appointment with next week for further instructions and will call back with any other questions.    Reason for Disposition    Information only question and nurse able to answer    Protocols used: NO PROTOCOL AVAILABLE - INFORMATION ONLY-A-[place]      "

## 2021-05-29 NOTE — PROGRESS NOTES
Chief complaint: Hives    History of present illness: This is a pleasant 58-year-old woman here today for evaluation of hives.  She states that she had hives in 2017 that accompanied lip swelling and eye swelling.  She states the hives resolved spontaneously no etiology was found.  She notes that the hives have returned over the last 2 months.  She has a vulvar body.  No swelling of her lips or eyes.  She does not note any bruising.  The hives last less than 24 hours.  She was using Benadryl and was recently prescribed hydroxyzine.  She reports that helps but they often do come back.  She has an almost on daily basis.  Urticaria activity score for the last 5 days 42.  She does use ibuprofen but has not noted in association with the hives and ibuprofen.  Notes that they are worse in areas of pressure.  She denies any illness.  No belly pain or reflux.  No recent travel outside [facility].    Past medical history, social history, family medical history, meds and allergies reviewed and updated accordingly.      Review of Systems performed as above and the remainder is negative.         Current Outpatient Medications:      albuterol (PROVENTIL HFA) 90 mcg/actuation inhaler, Inhale 2 puffs every 6 (six) hours as needed for wheezing., Disp: 1 Inhaler, Rfl: 0     atenolol (TENORMIN) 100 MG tablet, Take 1 tablet by mouth daily., Disp: , Rfl:      atenolol-chlorthalidone (TENORETIC) 100-25 mg per tablet, TAKE 1 TABLET BY MOUTH ONCE DAILY, Disp: 90 tablet, Rfl: 2     citalopram (CELEXA) 40 MG tablet, Take 1 tablet (40 mg total) by mouth daily., Disp: 90 tablet, Rfl: 2     fexofenadine (ALLEGRA) 180 MG tablet, Take 180 mg by mouth daily., Disp: , Rfl:      fexofenadine (ALLEGRA) 180 MG tablet, Take 180 mg by mouth daily., Disp: , Rfl:      fluticasone (FLONASE) 50 mcg/actuation nasal spray, 1 spray into each nostril daily., Disp: , Rfl:      hydrOXYzine HCl (ATARAX) 25 MG tablet, Take 1 tablet (25 mg total) by mouth at  "bedtime as needed for itching., Disp: 20 tablet, Rfl: 0     predniSONE (DELTASONE) 20 MG tablet, Take 40 mg by mouth daily., Disp: 10 tablet, Rfl: 0     ranitidine (ZANTAC) 150 MG tablet, Take 1 tablet (150 mg total) by mouth 2 (two) times a day., Disp: 10 tablet, Rfl: 0     vitamin E 400 UNIT capsule, Take 400 Units by mouth daily., Disp: , Rfl:     Allergies   Allergen Reactions     Morphine      Sulfa (Sulfonamide Antibiotics)        /84 (Patient Site: Right Arm, Patient Position: Sitting, Cuff Size: Adult Large)   Pulse 70   Ht 5' 6.5\" (1.689 m)   Wt 189 lb 2 oz (85.8 kg)   LMP 12/14/1989   SpO2 98%   BMI 30.07 kg/m    Gen: Pleasant female not in acute distress  HEENT: Eyes no erythema of the bulbar or palpebral conjunctiva, no edema. Mouth: Throat clear, no lip or tongue edema.     Skin: Hives on her forearms bilaterally  Psych: Alert and oriented times 3    Impression report and plan:    1.  Chronic idiopathic urticaria    Allergy testing is not necessary for chronic urticaria.  I would like to check a TSH, CBC with differential, AST, ALT and creatinine as well as a vitamin D level.  These have all been associated with chronic urticaria.  Recommended that she start Allegra twice daily.  She is currently taking a daily Allegra for environmental allergies.  If this does not improve symptoms after 1 week she should increasing Allegra to 2 tablets twice daily.  Okay to use hydroxyzine as needed.  Went over specific triggers for urticaria.  If symptoms are not controlled, she will let me know.  Otherwise we talked about doing 2 to 4 weeks cycles.  85% of patients with chronic urticaria do resolve within 2 years.    Time spent with the patient, 30 minutes, greater than half spent counseling and coordination of care regarding chronic urticaria.  "

## 2021-05-30 VITALS — BODY MASS INDEX: 27.35 KG/M2 | WEIGHT: 174.25 LBS | HEIGHT: 67 IN

## 2021-05-31 VITALS — BODY MASS INDEX: 29.56 KG/M2 | WEIGHT: 185.9 LBS

## 2021-05-31 VITALS — BODY MASS INDEX: 29.76 KG/M2 | WEIGHT: 187.2 LBS

## 2021-05-31 VITALS — BODY MASS INDEX: 29.33 KG/M2 | WEIGHT: 184.5 LBS

## 2021-06-01 VITALS — WEIGHT: 180.06 LBS | BODY MASS INDEX: 28.63 KG/M2

## 2021-06-01 NOTE — PROGRESS NOTES
Assessment/Plan:        Diagnoses and all orders for this visit:    Acute recurrent sinusitis, unspecified location  -     cefdinir (OMNICEF) 300 MG capsule; Take 1 capsule (300 mg total) by mouth 2 (two) times a day for 10 days.  Dispense: 20 capsule; Refill: 0    Patient was prescribed antibiotics as noted, to take as directed.  Patient warned of potential side effects of antibiotics, including but not limited to:  Nausea, diarrhea, yeast infection, and limitation of OCP effectiveness.     Cough  -     albuterol (PROVENTIL HFA) 90 mcg/actuation inhaler; Inhale 2 puffs every 6 (six) hours as needed for wheezing.  Dispense: 2 Inhaler; Refill: 1          Subjective:    Patient ID: Vanessa Franco is a 59 y.o. female.    Sinusitis   This is a recurrent problem. The current episode started 1 to 4 weeks ago. The problem has been gradually worsening since onset. There has been no fever. The pain is moderate. Associated symptoms include chills, congestion, coughing, headaches, sinus pressure and a sore throat. Pertinent negatives include no diaphoresis. Past treatments include acetaminophen. The treatment provided mild relief.       The following portions of the patient's history were reviewed and updated as appropriate: allergies, current medications and problem list.    Review of Systems   Constitutional: Positive for chills. Negative for diaphoresis.   HENT: Positive for congestion, postnasal drip, rhinorrhea, sinus pressure, sinus pain, sore throat and voice change. Negative for ear discharge.    Respiratory: Positive for cough.    Cardiovascular: Negative.    Gastrointestinal: Negative.    Neurological: Positive for headaches.   All other systems reviewed and are negative.            Objective:    Physical Exam   Constitutional: She is oriented to person, place, and time. She appears well-developed and well-nourished. No distress.   HENT:   Head: Normocephalic and atraumatic.   Right Ear: External ear normal.    Left Ear: External ear normal.   Nose: Nose normal.   Mouth/Throat: Oropharyngeal exudate present.   Eyes: Pupils are equal, round, and reactive to light. Conjunctivae and EOM are normal. Right eye exhibits no discharge. Left eye exhibits no discharge.   Neck: Normal range of motion. Neck supple.   Cardiovascular: Normal rate, regular rhythm and normal heart sounds.   Pulmonary/Chest: Effort normal and breath sounds normal. No respiratory distress. She has no wheezes. She has no rales.   Lymphadenopathy:     She has no cervical adenopathy.   Neurological: She is alert and oriented to person, place, and time.   Skin: Skin is warm. She is not diaphoretic.

## 2021-06-02 VITALS — BODY MASS INDEX: 29.78 KG/M2 | WEIGHT: 187.31 LBS

## 2021-06-02 VITALS — WEIGHT: 188.31 LBS | BODY MASS INDEX: 29.94 KG/M2

## 2021-06-03 VITALS
BODY MASS INDEX: 31.13 KG/M2 | SYSTOLIC BLOOD PRESSURE: 134 MMHG | TEMPERATURE: 99.8 F | OXYGEN SATURATION: 98 % | HEART RATE: 89 BPM | WEIGHT: 195.8 LBS | DIASTOLIC BLOOD PRESSURE: 82 MMHG

## 2021-06-03 VITALS — WEIGHT: 189.13 LBS | BODY MASS INDEX: 30.07 KG/M2

## 2021-06-03 VITALS — WEIGHT: 189.13 LBS | HEIGHT: 67 IN | BODY MASS INDEX: 29.69 KG/M2

## 2021-06-03 NOTE — PROGRESS NOTES
ASSESSMENT:  1. Hypertension, essential, benign  - atenolol-chlorthalidone (TENORETIC) 100-25 mg per tablet; Take 1 tablet by mouth daily.  Dispense: 90 tablet; Refill: 2  - Basic Metabolic Panel  - JI LAV    2. Need for influenza vaccination    3. Vitamin D deficiency  - ergocalciferol (ERGOCALCIFEROL) 50,000 unit capsule; Take 1 capsule (50,000 Units total) by mouth once a week.  Dispense: 8 capsule; Refill: 0  I discussed the various needs for her.  Did look at her health care maintenance and she will be having had mammogram next year.  She does not need to have Pap smear because she did have a hysterectomy and has had normal Pap smear afterwards.  She is up-to-date with colonoscopy, also with her immunization.  She does need to get her flu shot today.  Regarding hypertension blood pressure is controlled, medication is refilled.  It is in the follow-up as well to look at her kidney function and electrolytes.  Regarding the pelvic floor dysfunction we will try to figure out which specialist she should be going to follow-up therapy for that.  Will inform her as soon as we are able to determine where.    PLAN:  There are no Patient Instructions on file for this visit.    Orders Placed This Encounter   Procedures     Influenza, Recombinant, Inj, Quadrivalent, PF, 18+YRS     Basic Metabolic Panel     JIC LAV     Medications Discontinued During This Encounter   Medication Reason     ranitidine (ZANTAC) 150 MG tablet      citalopram (CELEXA) 40 MG tablet      atenolol-chlorthalidone (TENORETIC) 100-25 mg per tablet Reorder     ergocalciferol (ERGOCALCIFEROL) 50,000 unit capsule Reorder       No follow-ups on file.      CHIEF COMPLAINT:  Chief Complaint   Patient presents with     Hypertension     CHECK UP        HISTORY OF PRESENT ILLNESS:  Vanessa is a 59 y.o. female presenting to the clinic today for what she described as checkup.  She has a history of hypertension, and takes atenolol with chlorthalidone.  Blood  pressure seems to be under good control at this time.  She also had a history of using citalopram for menopausal symptoms.  She is not depressed per se, but noted that the medication has been making her feel callahan and she did stop that.  She has also had pruritus, with that it was found that she has deficiency of vitamin D.  She was prescribed with the supplements but has not been taking it.  She has been on fexofenadine for the itching and that has been quite helpful.  She also wanted to make sure that she is up-to-date in a healthcare maintenance.  She has had a colonoscopy because of an intestinal problem.  She is having some difficulty having a bowel movement.  She was found to have pelvic floor problems which obstructive defecation.  She has seen the colorectal physicians.  She was supposed to have a physical therapy but unfortunately was unable to figure out where she is supposed to go.    REVIEW OF SYSTEMS:   She otherwise feels well.  She is sleeps well, is physically active, denies any other major concerns on today's visit.  All other systems are negative.    PFSH:  Reviewed, as below.    Social History     Tobacco Use   Smoking Status Never Smoker   Smokeless Tobacco Never Used       Family History   Problem Relation Age of Onset     Dementia Mother      Breast cancer Mother      Cancer Father         Lung       Social History     Socioeconomic History     Marital status:      Spouse name: Not on file     Number of children: Not on file     Years of education: Not on file     Highest education level: Not on file   Occupational History     Not on file   Social Needs     Financial resource strain: Not on file     Food insecurity:     Worry: Not on file     Inability: Not on file     Transportation needs:     Medical: Not on file     Non-medical: Not on file   Tobacco Use     Smoking status: Never Smoker     Smokeless tobacco: Never Used   Substance and Sexual Activity     Alcohol use: Yes      Alcohol/week: 1.0 standard drinks     Types: 1 Glasses of wine per week     Comment: Yearly     Drug use: No     Sexual activity: Not on file   Lifestyle     Physical activity:     Days per week: Not on file     Minutes per session: Not on file     Stress: Not on file   Relationships     Social connections:     Talks on phone: Not on file     Gets together: Not on file     Attends Mosque service: Not on file     Active member of club or organization: Not on file     Attends meetings of clubs or organizations: Not on file     Relationship status: Not on file     Intimate partner violence:     Fear of current or ex partner: Not on file     Emotionally abused: Not on file     Physically abused: Not on file     Forced sexual activity: Not on file   Other Topics Concern     Not on file   Social History Narrative     Not on file       Past Surgical History:   Procedure Laterality Date     ANKLE FRACTURE SURGERY       BREAST SURGERY        SECTION, CLASSIC       HYSTERECTOMY  1989     REDUCTION MAMMAPLASTY Bilateral 1993       Allergies   Allergen Reactions     Morphine      Sulfa (Sulfonamide Antibiotics)        Active Ambulatory Problems     Diagnosis Date Noted     No Active Ambulatory Problems     Resolved Ambulatory Problems     Diagnosis Date Noted     No Resolved Ambulatory Problems     Past Medical History:   Diagnosis Date     Anxiety      Environmental allergies      Hypertension        Current Outpatient Medications   Medication Sig Dispense Refill     albuterol (PROVENTIL HFA) 90 mcg/actuation inhaler Inhale 2 puffs every 6 (six) hours as needed for wheezing. 2 Inhaler 1     atenolol-chlorthalidone (TENORETIC) 100-25 mg per tablet Take 1 tablet by mouth daily. 90 tablet 2     fexofenadine (ALLEGRA) 180 MG tablet Take 180 mg by mouth daily.       fluticasone (FLONASE) 50 mcg/actuation nasal spray 1 spray into each nostril daily.       ergocalciferol (ERGOCALCIFEROL) 50,000 unit capsule Take 1 capsule  (50,000 Units total) by mouth once a week. 8 capsule 0     vitamin E 400 UNIT capsule Take 400 Units by mouth daily.       No current facility-administered medications for this visit.        VITALS:  Vitals:    12/03/19 1502   BP: 128/78   Pulse: 88   Resp: 16   SpO2: 96%   Weight: 194 lb (88 kg)     Wt Readings from Last 3 Encounters:   12/03/19 194 lb (88 kg)   10/12/19 193 lb (87.5 kg)   09/17/19 195 lb 12.8 oz (88.8 kg)     Body mass index is 30.84 kg/m .    PHYSICAL EXAM:  General Appearance: Alert, cooperative, no distress, appears stated age  HEENT: Pupils are equal and reactive, extraocular motions is normal.  Oropharynx is moist.  Neck is supple no notable thyromegaly.  External ears are normal.  Lungs: Clear to auscultation bilaterally, respirations unlabored  Heart: Regular rhythm and normal rate,S1 and S2 normal, no murmur, rub, or gallop  Abdomen: Soft nontender no palpable organs.  Musculoskeletal: Normal range of motion. No joint swelling or deformity.   Neurologic:  Alert and oriented times 3.   Psychiatric: Normal mood and affect.    MEDICATIONS:  Current Outpatient Medications   Medication Sig Dispense Refill     albuterol (PROVENTIL HFA) 90 mcg/actuation inhaler Inhale 2 puffs every 6 (six) hours as needed for wheezing. 2 Inhaler 1     atenolol-chlorthalidone (TENORETIC) 100-25 mg per tablet Take 1 tablet by mouth daily. 90 tablet 2     fexofenadine (ALLEGRA) 180 MG tablet Take 180 mg by mouth daily.       fluticasone (FLONASE) 50 mcg/actuation nasal spray 1 spray into each nostril daily.       ergocalciferol (ERGOCALCIFEROL) 50,000 unit capsule Take 1 capsule (50,000 Units total) by mouth once a week. 8 capsule 0     vitamin E 400 UNIT capsule Take 400 Units by mouth daily.       No current facility-administered medications for this visit.

## 2021-06-04 VITALS
HEART RATE: 77 BPM | DIASTOLIC BLOOD PRESSURE: 68 MMHG | OXYGEN SATURATION: 97 % | WEIGHT: 198 LBS | SYSTOLIC BLOOD PRESSURE: 120 MMHG | BODY MASS INDEX: 31.48 KG/M2 | TEMPERATURE: 98 F

## 2021-06-04 VITALS
HEART RATE: 88 BPM | DIASTOLIC BLOOD PRESSURE: 78 MMHG | RESPIRATION RATE: 16 BRPM | BODY MASS INDEX: 30.84 KG/M2 | WEIGHT: 194 LBS | SYSTOLIC BLOOD PRESSURE: 128 MMHG | OXYGEN SATURATION: 96 %

## 2021-06-05 VITALS
DIASTOLIC BLOOD PRESSURE: 82 MMHG | WEIGHT: 210 LBS | BODY MASS INDEX: 33.39 KG/M2 | SYSTOLIC BLOOD PRESSURE: 120 MMHG | HEART RATE: 68 BPM

## 2021-06-05 VITALS
TEMPERATURE: 98.3 F | WEIGHT: 202.4 LBS | SYSTOLIC BLOOD PRESSURE: 122 MMHG | HEIGHT: 68 IN | BODY MASS INDEX: 30.68 KG/M2 | HEART RATE: 68 BPM | DIASTOLIC BLOOD PRESSURE: 82 MMHG

## 2021-06-05 NOTE — TELEPHONE ENCOUNTER
Refill Approved    Rx renewed per Medication Renewal Policy. Medication was last renewed on 12/3/19 .    Debbie Rosenberg, [facility] Connection Triage/Med Refill 2/3/2020     Requested Prescriptions   Pending Prescriptions Disp Refills     atenoloL-chlorthalidone (TENORETIC) 100-25 mg per tablet [Pharmacy Med Name: Atenolol-Chlorthalidone 100-25 MG Oral Tablet] 30 tablet 0     Sig: TAKE ONE TABLET BY MOUTH ONCE DAILY       Diuretics/Combination Diuretics Refill Protocol  Passed - 2/3/2020  9:36 AM        Passed - Visit with PCP or prescribing provider visit in past 12 months     Last office visit with prescriber/PCP: 12/3/2019 Judith Olivares MD OR same dept: 12/3/2019 Judith Olivares MD OR same specialty: 12/3/2019 Judith Olivares MD  Last physical: 3/28/2016 Last MTM visit: Visit date not found   Next visit within 3 mo: Visit date not found  Next physical within 3 mo: Visit date not found  Prescriber OR PCP: Judith Olivares MD  Last diagnosis associated with med order: 1. Hypertension, essential, benign  - atenoloL-chlorthalidone (TENORETIC) 100-25 mg per tablet [Pharmacy Med Name: Atenolol-Chlorthalidone 100-25 MG Oral Tablet]; TAKE ONE TABLET BY MOUTH ONCE DAILY  Dispense: 30 tablet; Refill: 0    If protocol passes may refill for 12 months if within 3 months of last provider visit (or a total of 15 months).             Passed - Serum Potassium in past 12 months      Lab Results   Component Value Date    Potassium 3.6 12/03/2019             Passed - Serum Sodium in past 12 months      Lab Results   Component Value Date    Sodium 143 12/03/2019             Passed - Blood pressure on file in past 12 months     BP Readings from Last 1 Encounters:   12/03/19 128/78             Passed - Serum Creatinine in past 12 months      Creatinine   Date Value Ref Range Status   12/03/2019 0.81 0.60 - 1.10 mg/dL Final

## 2021-06-06 NOTE — PROGRESS NOTES
Assessment/Plan:        Diagnoses and all orders for this visit:    Cough  -     ipratropium-albuteroL 0.5-2.5 mg/3 mL nebulizer solution 3 mL (DUO-NEB)  -     codeine-guaiFENesin (GUAIFENESIN AC)  mg/5 mL liquid; Take 10 mL by mouth 2 (two) times a day as needed for cough.  Dispense: 240 mL; Refill: 0  -     amoxicillin (AMOXIL) 875 MG tablet; Take 1 tablet (875 mg total) by mouth 2 (two) times a day for 10 days.  Dispense: 20 tablet; Refill: 0  -     albuterol (PROVENTIL HFA) 90 mcg/actuation inhaler; Inhale 2 puffs every 6 (six) hours as needed for wheezing.  Dispense: 2 Inhaler; Refill: 1    Urinary frequency  -     Urinalysis-UC if Indicated    Post-nasal drainage  We did do a urinalysis and that was negative.  I think that she has mild complication from the influenza A infection.  We discussed management at this time and I did give her some antibiotics to use if her symptoms are not improving.  She can call to let us know if there is no improvement.  Use of DuoNeb did make a good improvement for her and I did encourage her to use her albuterol.        Subjective:    Patient ID: Vanessa Franco is a 59 y.o. female.    She was diagnosed recently with influenza A infection and was given symptomatic treatment.  Unfortunately she did not present on time to be treated with antiviral medication.  She noted some improvement but she does continue to have a lot of cough.  Cough is said to be intermittently productive but mostly dry.  She does not think that she is wheezing and has not been using her inhaler.  She feels that she has a tickle behind her throat that she is unable to cough out.  She noted no fevers and no chills.  She does not have any sore throat except when she coughs that she will have some pain.  Denies any ear pain.  She does have some urinary frequency that she noted started about the same time that her backslash tract infection started.  She noted no dysuria but noted that she has been going  more frequently than usual.  She has had some urinary incontinence because of it.  Yesterday she does not have any fever, no lower back pain.      The following portions of the patient's history were reviewed and updated as appropriate: allergies, current medications, past family history, past medical history, past social history, past surgical history and problem list.    Review of Systems   Constitutional: Negative for chills and fatigue.   HENT: Positive for congestion, postnasal drip, rhinorrhea and sore throat. Negative for ear pain, sinus pressure and sinus pain.    Respiratory: Positive for cough and chest tightness. Negative for shortness of breath and wheezing.    Genitourinary: Positive for frequency. Negative for dysuria and urgency.   Neurological: Negative for light-headedness and headaches.     Vitals:    02/24/20 1310   BP: 120/68   Pulse: 77   Temp: 98  F (36.7  C)   TempSrc: Oral   SpO2: 97%   Weight: 198 lb (89.8 kg)             Objective:    Physical Exam   Constitutional: She appears well-developed and well-nourished. No distress.   HENT:   Right Ear: Tympanic membrane normal.   Left Ear: Tympanic membrane normal.   Nose: Right sinus exhibits no maxillary sinus tenderness and no frontal sinus tenderness. Left sinus exhibits no maxillary sinus tenderness and no frontal sinus tenderness.   Mouth/Throat: Oropharynx is clear and moist.   She does have some postnasal drip.   Cardiovascular: Normal rate and regular rhythm.   Pulmonary/Chest: Effort normal. She has decreased breath sounds. She has no wheezes. She has no rhonchi. She has no rales.   Abdominal: Soft. There is no abdominal tenderness. There is no rebound.   Lymphadenopathy:     She has no cervical adenopathy.   Neurological: She is alert.

## 2021-06-08 NOTE — PROGRESS NOTES
Surgery- cataract   DOS - 1 st eye 2/17/17 and  2 nd  3/6/17/  Location- [facility] 2/17/17 and [facility] 3/6/17 ( [facility] fax # [phone] )  Physician - Dr Caridad Narvaez    ASSESSMENT/PLAN:  Vanessa Franco 56 y.o. female with bilateral cataracts here for preoperative evaluation for cataract surgery    1. Preop examination  - Hemoglobin  - Potassium    2. HTN (hypertension)  Elevated today.  Patient stated she has not taken her medication which is atenolol chlorthalidone.  I advised her to be consistent with her blood pressure medication and the complication of uncontrolled hypertension.  Patient verbalized understanding  She has hypokalemia as result of her blood pressure medication.  He takes potassium supplement.  We'll check a potassium today  EKG=Normal sinus rhythm on 3/28/16    Results for orders placed or performed in visit on 02/13/17   Hemoglobin   Result Value Ref Range    Hemoglobin 12.9 12.0 - 16.0 g/dL       Cardiac Risk Stratification and Management:  Active Cardiac Conditions: No  Low Risk surgery: Yes  Functional Capacity >=4METS:  Yes  Revised cardiac risk index of 0.4%  I do not have any contraindication to the planned procedure.      SUBJECTIVE:  Vanessa Franco is a 56 y.o. female who is here for preoperative evaluation. Vitrectomy April 2016 but was found to have worsening cataracts since that surgery.  States that her vision has been affected.  Denies any eye pain.  She will have cataract of her left eye on February 17, 2017 and cataract surgery on her right eye on March 6, 2017.   Vanessa Franco has no questions or concerns today.    Prior Anesthesia: Yes Previous Anesthesia Reaction:  no  Bleeding Disorder: no      Current Outpatient Prescriptions   Medication Sig Dispense Refill     albuterol (PROVENTIL HFA) 90 mcg/actuation inhaler Inhale 2 puffs every 6 (six) hours as needed for wheezing. 1 Inhaler 0     atenolol-chlorthalidone (TENORETIC) 100-25 mg per  "tablet Take 1 tablet by mouth daily. 90 tablet 2     citalopram (CELEXA) 20 MG tablet Take 1 tablet (20 mg total) by mouth daily. 90 tablet 2     fexofenadine (ALLEGRA) 180 MG tablet Take 180 mg by mouth daily.       fluticasone (FLONASE) 50 mcg/actuation nasal spray 1 spray into each nostril daily.       potassium chloride (KLOR-CON) 10 MEQ CR tablet Take 10 mEq by mouth daily.       vitamin E 400 UNIT capsule Take 400 Units by mouth daily.       No current facility-administered medications for this visit.        Allergies   Allergen Reactions     Morphine      Sulfa (Sulfonamide Antibiotics)        Past Medical History:   Diagnosis Date     Anxiety      Environmental allergies      Hypertension        Past Surgical History:   Procedure Laterality Date     ANKLE FRACTURE SURGERY       BREAST SURGERY        SECTION, CLASSIC       HYSTERECTOMY         Social History     Social History     Marital status:      Spouse name: N/A     Number of children: N/A     Years of education: N/A     Social History Main Topics     Smoking status: Never Smoker     Smokeless tobacco: Never Used     Alcohol use 0.6 oz/week     1 Glasses of wine per week      Comment: Yearly     Drug use: No     Sexual activity: Not Asked     Other Topics Concern     None     Social History Narrative       Family History   Problem Relation Age of Onset     Dementia Mother      Breast cancer Mother      No Medical Problems Sister      No Medical Problems Brother      Cancer Father      Lung       A 12 point review of symptoms was assessed and found to be negative except for those already mentioned.    OBJECTIVE:    Vitals:    17 0922   BP: (!) 140/100   Patient Site: Left Arm   Patient Position: Sitting   Cuff Size: Adult Regular   Pulse: 83   SpO2: 98%   Weight: 174 lb 4 oz (79 kg)   Height: 5' 6.5\" (1.689 m)       Physical Exam:  Constitutional: Patient is oriented to person, place, and time. Patient appears well-developed and " well-nourished. No distress.   Head: Normocephalic and atraumatic.   Right Ear: External ear normal.   Left Ear: External ear normal.   Nose: Nose normal.   Mouth/Throat: Oropharynx is clear and moist. No oropharyngeal exudate.   Eyes: Conjunctivae and EOM are normal. Right eye exhibits no discharge. Left eye exhibits no discharge. No scleral icterus.   Neck: Neck supple. No JVD present. No tracheal deviation present. No thyromegaly present.   Cardiovascular: Normal rate, regular rhythm, normal heart sounds and intact distal pulses. No murmur heard.   Pulmonary/Chest: Effort normal and breath sounds normal. No stridor. No respiratory distress. Patient has no wheezes, no rales, exhibits no tenderness.   Abdominal: Soft. Bowel sounds are normal. Patient exhibits no distension and no mass. There is no tenderness. There is no rebound and no guarding.   Lymphadenopathy:  Patient has no cervical adenopathy.   Skin: Skin is warm and dry. No rash noted. Patient is not diaphoretic. No erythema. No pallor.

## 2021-06-11 NOTE — PROGRESS NOTES
ASSESSMENT:  1. Allergic reaction to chemical substance  - ranitidine (ZANTAC) 150 MG tablet; Take 1 tablet (150 mg total) by mouth 2 (two) times a day.  Dispense: 10 tablet; Refill: 0  - hydrOXYzine (ATARAX) 25 MG tablet; Take 1 tablet (25 mg total) by mouth 3 (three) times a day as needed for itching.  Dispense: 30 tablet; Refill: 0    2. Foreign body in ear lobe, left, initial encounter    3. Hypertension, essential, benign  - potassium chloride (KLOR-CON) 10 MEQ CR tablet; Take 1 tablet (10 mEq total) by mouth daily.  Dispense: 90 tablet; Refill: 0    PLAN:  There are no Patient Instructions on file for this visit.    No orders of the defined types were placed in this encounter.    Medications Discontinued During This Encounter   Medication Reason     citalopram (CELEXA) 20 MG tablet Therapy completed     potassium chloride (KLOR-CON) 10 MEQ CR tablet Reorder       No Follow-up on file.     Patient should finish her course of prednisone. Prescribed ranitidine and hydroxyzine for the allergic reaction. Patient should take the hydroxyzine at night. Patient does have to continue to take Benadryl or Allegra. With regard to earring in left earlobe, patient will come in to the clinic on June 9th to have the earring removed.     CHIEF COMPLAINT:  Chief Complaint   Patient presents with     Urticaria     hives on body, was [facility],      Foreign Body in Ear     left ear, earing is stuck in ear       HISTORY OF PRESENT ILLNESS:  Vanessa is a 57 y.o. female presenting to the clinic today for evaluation of a hives and a foreign body in earlobe.     Hives: She has hives on her arms, legs, neck, and back since the night of June 2rd. She scratches at the hives because they itch. She notes that she did use a new conditioner the day before the hives broke out, and she stopped using it after that. She also notes that she ate a peppery salad from [employer] the day before the hives broke out. She was seen at  [facility] on June [facility]rd with the hives, and she was prescribed prednisone and was advised to switch off between Benadryl and Allegra. She has been taking the prednisone, Benadryl, and Allegra, but she continues to itch and break out in hives. The medications did help to slow up the itching and help her sleep, but she otherwise denies improvement. The itching is still bothersome. She does not have difficulty breathing.     Foreign Body in Earlobe: She got her ears pierced earlier this year. Her left ear swelled up about a month ago around the piercing, and she was not able to remove the earring. The piercing closed up, and skin grew over the earring. She is unsure if the earring post fell out of not. She would like to have the earring removed from her left earlobe.     Hypertension: She takes atenolol-chlorthalidone for hypertension. Her blood pressure in clinic today is 122/80.     Anxiety: She has been off of citalopram for 2 months, and she has been doing well with regard to anxiety.     Health Maintenance: She has not had a colonoscopy.     REVIEW OF SYSTEMS:   Comprehensive review of systems negative except as noted above.    PFSH:  Reviewed, as below.     TOBACCO USE:  History   Smoking Status     Never Smoker   Smokeless Tobacco     Never Used       VITALS:  Vitals:    06/06/17 0931   BP: 122/80   Pulse: 64   Temp: 98.9  F (37.2  C)   Weight: 185 lb 14.4 oz (84.3 kg)     Wt Readings from Last 3 Encounters:   06/06/17 185 lb 14.4 oz (84.3 kg)   02/13/17 174 lb 4 oz (79 kg)   06/13/16 177 lb 4.8 oz (80.4 kg)     Body mass index is 29.56 kg/(m^2).    PHYSICAL EXAM:  General Appearance: Alert, cooperative, no distress, appears stated age  Head: Normocephalic, without obvious abnormality, atraumatic  Eyes: Pupils equal, symmetric  Ears: Left earlobe has a hard, round, small foreign body, no signs of infection.  Nose: Nares normal, septum midline, mucosa normal, no drainage  Throat: Lips, mucosa, and tongue  normal; teeth and gums normal  Neck: Supple, symmetrical, trachea midline, no adenopathy;  thyroid: not enlarged, symmetric, no tenderness/mass/nodules  Lungs: Clear to auscultation bilaterally, respirations unlabored  Heart: Regular rate and rhythm, S1 and S2 normal, no murmur, rub, or gallop  Skin: Diffuse maculopapular skin eruptions with some hive-like reaction, more on the external aspect of the arms bilaterally, neck, upper back, and knees, small amount on the abdomen, signs of excoriation/itching.   Neurologic:  Alert and oriented times 3. Normal reflexes. Cranial nerves II-XII intact.   Psychiatric: Normal mood and affect.      ADDITIONAL HISTORY SUMMARIZED (2): Reviewed 6/3/2017 note from [facility] regarding hives.   DECISION TO OBTAIN EXTRA INFORMATION (1): Requested information from [facility] Everywhere.   RADIOLOGY TESTS (1): None.  LABS (1): None.  MEDICINE TESTS (1): None.  INDEPENDENT REVIEW (2 each): None.       The visit lasted a total of 20 minutes face to face with the patient. Over 50% of the time was spent counseling and educating the patient about hives and foreign body in earlobe.    IPhilipp, am scribing for and in the presence of, Dr. Olivares.    I, Dr. Olivares, personally performed the services described in this documentation, as scribed by Philipp Hinton in my presence, and it is both accurate and complete.    MEDICATIONS:  Current Outpatient Prescriptions   Medication Sig Dispense Refill     albuterol (PROVENTIL HFA) 90 mcg/actuation inhaler Inhale 2 puffs every 6 (six) hours as needed for wheezing. 1 Inhaler 0     atenolol-chlorthalidone (TENORETIC) 100-25 mg per tablet Take 1 tablet by mouth daily. 90 tablet 1     fexofenadine (ALLEGRA) 180 MG tablet Take 180 mg by mouth daily.       fluticasone (FLONASE) 50 mcg/actuation nasal spray 1 spray into each nostril daily.       potassium chloride (KLOR-CON) 10 MEQ CR tablet Take 1 tablet (10 mEq total) by mouth daily. 90 tablet 0      predniSONE (DELTASONE) 20 MG tablet TK 2 TS PO QD WITH A MEAL FOR 5 DAYS  0     hydrOXYzine (ATARAX) 25 MG tablet Take 1 tablet (25 mg total) by mouth 3 (three) times a day as needed for itching. 30 tablet 0     ranitidine (ZANTAC) 150 MG tablet Take 1 tablet (150 mg total) by mouth 2 (two) times a day. 10 tablet 0     vitamin E 400 UNIT capsule Take 400 Units by mouth daily.       No current facility-administered medications for this visit.        Total data points: 3

## 2021-06-11 NOTE — PROGRESS NOTES
ASSESSMENT:  1. Foreign body of ear, left, subsequent encounter  - Foreign Body Removal - Clinic  - Td, Adult, Adsorbed (blue label)  - acetaminophen-codeine (TYLENOL #3) 300-30 mg per tablet; Take 1-2 tablets by mouth 2 (two) times a day as needed for pain.  Dispense: 20 tablet; Refill: 0  - cephalexin (KEFLEX) 500 MG capsule; Take 1 capsule (500 mg total) by mouth 3 (three) times a day for 7 days.  Dispense: 21 capsule; Refill: 0      PLAN:  There are no Patient Instructions on file for this visit.    Orders Placed This Encounter   Procedures     Foreign Body Removal - Clinic     This order was created via procedure documentation     Td, Adult, Adsorbed (blue label)     There are no discontinued medications.    No Follow-up on file.     Prescribed Tylenol #3 for pain. Prescribed Keflex for patient to take if her left earlobe becomes swollen and red. Patient will also get a tetanus shot today.     CHIEF COMPLAINT:  Chief Complaint   Patient presents with     Foreign Body in Ear     earring in left earlobe       HISTORY OF PRESENT ILLNESS:  Vanessa is a 57 y.o. female presenting to the clinic today for a procedure to remove an earring from her left earlobe. The problem was discussed at her June 6th office visit.     REVIEW OF SYSTEMS:   The itching has stopped since her last office visit on June 6th. All other systems are negative.    PFSH:  Reviewed, as below.     TOBACCO USE:  History   Smoking Status     Never Smoker   Smokeless Tobacco     Never Used       VITALS:  Vitals:    06/12/17 1614   BP: 138/80   Pulse: 84     Wt Readings from Last 3 Encounters:   06/06/17 185 lb 14.4 oz (84.3 kg)   02/13/17 174 lb 4 oz (79 kg)   06/13/16 177 lb 4.8 oz (80.4 kg)     There is no height or weight on file to calculate BMI.       PHYSICAL EXAM:  General Appearance: Alert, cooperative, no distress, appears stated age  HEENT: Pupils equal, symmetric  Ears: Left earlobe has a hard, round, small foreign body, no signs of  infection.  Lungs: Respirations unlabored  Neurologic:  Alert and oriented times 3. Normal reflexes. Cranial nerves II-XII intact.   Psychiatric: Normal mood and affect.    Foreign Body Removal  Date/Time: 6/12/2017 4:24 PM  Performed by: MELIDA OLIVARES  Authorized by: MELIDA OLIVARES   Body area: ear  Location details: left ear    Anesthesia:  Local Anesthetic: lidocaine 1% without epinephrine   Sedation:  Patient sedated: no    Patient restrained: no  Patient cooperative: yes  0 objects recovered.  Post-procedure assessment: foreign body not removed  Patient tolerance: Patient tolerated the procedure well with no immediate complications  Comments: Following incision into the posterior aspect of the left earlobe, there was no foreign body that was found despite good search. Area cleaned and used Dermabond and Steri Strips, good hemostasis achieved. Medication prescribed for patient, and patient advised to watch out for abnormal swellings.      Though the area still feels firm /hard to touch.  I, Philipp Hinton, am scribing for and in the presence of, Dr. Olivares.    I, Dr. Olivares, personally performed the services described in this documentation, as scribed by Philipp Hinton in my presence, and it is both accurate and complete.    MEDICATIONS:  Current Outpatient Prescriptions   Medication Sig Dispense Refill     albuterol (PROVENTIL HFA) 90 mcg/actuation inhaler Inhale 2 puffs every 6 (six) hours as needed for wheezing. 1 Inhaler 0     atenolol-chlorthalidone (TENORETIC) 100-25 mg per tablet Take 1 tablet by mouth daily. 90 tablet 1     fexofenadine (ALLEGRA) 180 MG tablet Take 180 mg by mouth daily.       fluticasone (FLONASE) 50 mcg/actuation nasal spray 1 spray into each nostril daily.       hydrOXYzine (ATARAX) 25 MG tablet Take 1 tablet (25 mg total) by mouth 3 (three) times a day as needed for itching. 30 tablet 0     potassium chloride (KLOR-CON) 10 MEQ CR tablet Take 1 tablet (10  mEq total) by mouth daily. 90 tablet 0     predniSONE (DELTASONE) 20 MG tablet TK 2 TS PO QD WITH A MEAL FOR 5 DAYS  0     ranitidine (ZANTAC) 150 MG tablet Take 1 tablet (150 mg total) by mouth 2 (two) times a day. 10 tablet 0     vitamin E 400 UNIT capsule Take 400 Units by mouth daily.       acetaminophen-codeine (TYLENOL #3) 300-30 mg per tablet Take 1-2 tablets by mouth 2 (two) times a day as needed for pain. 20 tablet 0     cephalexin (KEFLEX) 500 MG capsule Take 1 capsule (500 mg total) by mouth 3 (three) times a day for 7 days. 21 capsule 0     No current facility-administered medications for this visit.

## 2021-06-13 NOTE — TELEPHONE ENCOUNTER
Called LM. Dr. Olivares does not have any opening until the week of the 20th. We can get her in with anther doctor sooner. Asked if she wants to do a Video Visit. Please help make appt when she calls back

## 2021-06-13 NOTE — PROGRESS NOTES
Subjective     Vanessa Franco is a 60 y.o. female who presents to clinic today for the following health issues:    HPI   Right hip pain - patient reports persistent right hip pain for the past 2 weeks.  She notes that symptoms began after raking 35 bags of leaves.  She initially had treated the pain with ibuprofen and tylenol.  The next day, when pain was persisting, she had turned to a muscle relaxant that she had on hand at home.  This did seem to be helpful in controlling the pain. She notes that pain is primarily in the lateral/posterior hip with radiating into the right low back.  No radiation of pain into the leg. No leg weakness noted.  Two years ago she had an episode of severe low back pain which responded well to a course of physical therapy.  Current pains are worse with bending and lifting.  Pain sometimes will affect her sleep at night.     Hypertension - patient with history of chronic hypertension currently treated with atenolol-chlorthalidone.  She notes no side effects from the medication. No chest pain, palpitations, shortness of breath, or peripheral swelling. She will be due for routine monitoring blood work in mid-December.  She prefers to come back for a lab-only visit to check labs.      Patient Active Problem List   Diagnosis     Essential hypertension, benign     Chronic urticaria     Past Surgical History:   Procedure Laterality Date     ANKLE FRACTURE SURGERY       BREAST SURGERY        SECTION, CLASSIC       HYSTERECTOMY       REDUCTION MAMMAPLASTY Bilateral        Social History     Tobacco Use     Smoking status: Never Smoker     Smokeless tobacco: Never Used   Substance Use Topics     Alcohol use: Yes     Alcohol/week: 1.0 standard drinks     Types: 1 Glasses of wine per week     Comment: Yearly     Family History   Problem Relation Age of Onset     Dementia Mother      Breast cancer Mother      Cancer Father         Lung         Current Outpatient Medications    Medication Sig Dispense Refill     albuterol (PROVENTIL HFA) 90 mcg/actuation inhaler Inhale 2 puffs every 6 (six) hours as needed for wheezing. 2 Inhaler 1     atenoloL-chlorthalidone (TENORETIC) 100-25 mg per tablet Take 1 tablet by mouth daily. 90 tablet 3     ergocalciferol (ERGOCALCIFEROL) 50,000 unit capsule Take 1 capsule (50,000 Units total) by mouth once a week. 8 capsule 0     fexofenadine (ALLEGRA) 180 MG tablet Take 180 mg by mouth daily.       fluticasone (FLONASE) 50 mcg/actuation nasal spray 1 spray into each nostril daily.       No current facility-administered medications for this visit.      Allergies   Allergen Reactions     Morphine      Sulfa (Sulfonamide Antibiotics)      Review of Systems   Negative except as noted above in HPI.       Objective    /82 (Patient Site: Left Arm, Patient Position: Sitting, Cuff Size: Adult Large)   Pulse 68   Wt 210 lb (95.3 kg)   LMP 12/14/1989   BMI 33.39 kg/m    Body mass index is 33.39 kg/m .  Physical Exam   Physical Examination: General appearance - alert, well appearing, and in no distress  Mental status - alert, oriented to person, place, and time  Chest - clear to auscultation, no wheezes, rales or rhonchi, symmetric air entry  Heart - normal rate and regular rhythm, S1 and S2 normal, no murmurs noted  Musculoskeletal - right hip: pain noted with flexion against resistance. Mild pain reported with abduction against resistance.  Tender over the right lateral iliac crest radiating to right paraspinal mm.  No SI joint tenderness.  No vertebral tenderness.  Strength is symmetric in the lower extremities.   Extremities - no pedal edema noted, intact peripheral pulses    Assessment & Plan     Problem List Items Addressed This Visit     None      Visit Diagnoses     Strain of hip and thigh, right, initial encounter    -  Primary    Relevant Medications    cyclobenzaprine (FLEXERIL) 10 MG tablet    Hypertension, essential, benign        Relevant  Medications    atenoloL-chlorthalidone (TENORETIC) 100-25 mg per tablet    Other Relevant Orders    Comprehensive Metabolic Panel        Patient instructions:   Please continue on your current blood pressure medications, unchanged.      Please return to clinic sometime within the next couple of months to have your blood tests rechecked.      For your right hip I would recommend starting with some home exercises and use of as needed cyclobenzaprine (muscle relaxant).  If the pain is not improving over the next 2 weeks, please let me know and we will help you schedule with physical therapy.       Return or call if symptoms worsen or fail to improve within the next 2 weeks     Patient is advised to otherwise return to clinic for further evaluation if not responding to recommended treatments or if noting any new or worsening symptoms.     34 minutes spent with patient, with >50% of visit (23 minutes) spent on counseling regarding etiology of her acute right hip pain, treatment options and their associated potential side effects, reasonable expectations for improvement, importance of physical activity in treating current symptoms, and review of recommended stretching and strengthening exercises.  .    Christiano Keith MD  [facility]

## 2021-06-13 NOTE — PROGRESS NOTES
ASSESSMENT:  1. Need for immunization against influenza  - Influenza, Seasonal,Quad Inj, 36+ MOS    2. Depression  - citalopram (CELEXA) 20 MG tablet; Take 1 tablet (20 mg total) by mouth daily.  Dispense: 90 tablet; Refill: 1    3. Colon cancer screening  - Ambulatory referral for Colonoscopy    4. Constipation, unspecified constipation type  - polyethylene glycol (GLYCOLAX) 17 gram/dose powder; Take 17 g by mouth daily.  Dispense: 850 g; Refill: 0      PLAN:  Discussed the symptoms to watch. Was ok going back to the previous anti depressant that he has had before now. Discussed side effects.I advised to use of Miralax for constipation and explained that the symptoms may be unrelated to each other.Advised to follow up or call to notify us should she not have any improvement or her symptoms are getting worse. Discussed ways to improve constipation.    Orders Placed This Encounter   Procedures     Influenza, Seasonal,Quad Inj, 36+ MOS     Ambulatory referral for Colonoscopy     Referral Priority:   Routine     Referral Type:   Colonoscopy     Referral Reason:   Evaluation and Treatment     Referral Location:   [facility]     Requested Specialty:   Gastroenterology     Number of Visits Requested:   1     There are no discontinued medications.    No Follow-up on file.      CHIEF COMPLAINT:  Chief Complaint   Patient presents with     Depression     d/c meds due to stomach pain       HISTORY OF PRESENT ILLNESS:  Vanessa is a 57 y.o. female presenting to the clinic today with concerns of having worsening depression.She was on medication in the past but got better and stopped taking it.Lasted some months without treatment and started having worsening symptom. 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  She denies having a lot of anxiety but that is there.  She does not have any suicidal plans or ideation.   She does want to go back to taking medication for depression.  She stopped taking her medication because of abdominal discomfort as well as mild constipation.  She noted that she has a bulky stool that is difficult to pass on occasions and gives have pain around the pelvic region.  She thought that this was due to the depression medication.  Denies any blood in her stool.    REVIEW OF SYSTEMS:   There is some nausea but no vomiting.  She is not able to sleep very well noting that she wakes up very early.  Does not have any urinary symptoms at this point.  Denies any fevers or chills.  There is no hallucinations or paranoia.  No skin rash.  All other systems are negative.    PFSH:  Reviewed, as below.    History   Smoking Status     Never Smoker   Smokeless Tobacco     Never Used       Family History   Problem Relation Age of Onset     Dementia Mother      Breast cancer Mother      No Medical Problems Sister      No Medical Problems Brother      Cancer Father      Lung       Social History     Social History     Marital status:      Spouse name: N/A     Number of children: N/A     Years of education: N/A     Occupational History     Not on file.     Social History Main Topics     Smoking status: Never Smoker     Smokeless tobacco: Never Used     Alcohol use 0.6 oz/week     1 Glasses of wine per week      Comment: Yearly     Drug use: No     Sexual activity: Not on file     Other Topics Concern     Not on file     Social History Narrative       Past Surgical History:   Procedure Laterality Date     ANKLE FRACTURE SURGERY       BREAST SURGERY        SECTION, CLASSIC       HYSTERECTOMY         Allergies   Allergen Reactions     Morphine      Sulfa (Sulfonamide Antibiotics)        Active Ambulatory Problems     Diagnosis Date Noted     No Active Ambulatory Problems     Resolved Ambulatory Problems     Diagnosis Date Noted     No Resolved Ambulatory Problems     Past Medical History:   Diagnosis Date      Anxiety      Environmental allergies      Hypertension        Current Outpatient Prescriptions   Medication Sig Dispense Refill     atenolol-chlorthalidone (TENORETIC) 100-25 mg per tablet Take 1 tablet by mouth daily. 90 tablet 1     fexofenadine (ALLEGRA) 180 MG tablet Take 180 mg by mouth daily.       fluticasone (FLONASE) 50 mcg/actuation nasal spray 1 spray into each nostril daily.       albuterol (PROVENTIL HFA) 90 mcg/actuation inhaler Inhale 2 puffs every 6 (six) hours as needed for wheezing. 1 Inhaler 0     citalopram (CELEXA) 20 MG tablet Take 1 tablet (20 mg total) by mouth daily. 90 tablet 1     polyethylene glycol (GLYCOLAX) 17 gram/dose powder Take 17 g by mouth daily. 850 g 0     potassium chloride (KLOR-CON) 10 MEQ CR tablet Take 1 tablet (10 mEq total) by mouth daily. 90 tablet 0     predniSONE (DELTASONE) 20 MG tablet TK 2 TS PO QD WITH A MEAL FOR 5 DAYS  0     ranitidine (ZANTAC) 150 MG tablet Take 1 tablet (150 mg total) by mouth 2 (two) times a day. 10 tablet 0     vitamin E 400 UNIT capsule Take 400 Units by mouth daily.       No current facility-administered medications for this visit.        VITALS:  Vitals:    09/25/17 1147 09/25/17 1220   BP: (!) 140/100 130/84   Pulse: 88    Weight: 187 lb 3.2 oz (84.9 kg)      Wt Readings from Last 3 Encounters:   09/25/17 187 lb 3.2 oz (84.9 kg)   06/06/17 185 lb 14.4 oz (84.3 kg)   02/13/17 174 lb 4 oz (79 kg)     Body mass index is 29.76 kg/(m^2).    PHYSICAL EXAM:  General Appearance: Alert, cooperative, in psychological distress, appears stated age, mild tearfulness.  HEENT: Pupils are equal and reactive, neck is supple with no thyromegaly.  Oropharynx is normal and moist.    Lungs: Clear to auscultation bilaterally, respirations unlabored  Heart: Regular rate and rhythm, S1 and S2 normal, no murmur, rub, or gallop  Abdomen: Soft  Musculoskeletal: Normal range of motion. No joint swelling or deformity.   Extremities normal, atraumatic, no cyanosis or  edema  Skin: Skin color, texture, turgor normal, no rashes or lesions  Lymph nodes: Cervical nodes normal  Neurologic:  Alert and oriented times 3. Normal reflexes. Cranial nerves II-XII intact.   Psychiatric: She is callahan with congruent affect.  She is able to converse and has insight to her medical issue.  There is normal thought process.      MEDICATIONS:  Current Outpatient Prescriptions   Medication Sig Dispense Refill     atenolol-chlorthalidone (TENORETIC) 100-25 mg per tablet Take 1 tablet by mouth daily. 90 tablet 1     fexofenadine (ALLEGRA) 180 MG tablet Take 180 mg by mouth daily.       fluticasone (FLONASE) 50 mcg/actuation nasal spray 1 spray into each nostril daily.       albuterol (PROVENTIL HFA) 90 mcg/actuation inhaler Inhale 2 puffs every 6 (six) hours as needed for wheezing. 1 Inhaler 0     citalopram (CELEXA) 20 MG tablet Take 1 tablet (20 mg total) by mouth daily. 90 tablet 1     polyethylene glycol (GLYCOLAX) 17 gram/dose powder Take 17 g by mouth daily. 850 g 0     potassium chloride (KLOR-CON) 10 MEQ CR tablet Take 1 tablet (10 mEq total) by mouth daily. 90 tablet 0     predniSONE (DELTASONE) 20 MG tablet TK 2 TS PO QD WITH A MEAL FOR 5 DAYS  0     ranitidine (ZANTAC) 150 MG tablet Take 1 tablet (150 mg total) by mouth 2 (two) times a day. 10 tablet 0     vitamin E 400 UNIT capsule Take 400 Units by mouth daily.       No current facility-administered medications for this visit.

## 2021-06-13 NOTE — PROGRESS NOTES
"  Assessment & Plan     Vanessa was seen today for follow-up.    Diagnoses and all orders for this visit:    Pneumonia due to 2019 novel coronavirus    Cough  -     albuterol (PROVENTIL HFA) 90 mcg/actuation inhaler; Inhale 2 puffs every 6 (six) hours as needed for wheezing.    Mild major depression (H)    Patient doing well following recent hospitalization at [facility] due to COVID-19 pneumonia.  Her only residual symptom currently is a mild intermittent cough/wheezing.  She does not feel short of breath often.  This is clearly an improvement from her previous respiratory state, during her hospitalization.  Patient requests a refill for her albuterol metered-dose inhaler which she does find helpful during coughing fits or when feeling as if her breathing is somewhat tight.  She is currently using the inhaler on a daily basis still.  We reviewed need for follow-up if over the coming months she is continuing to need to use the inhaler more than a few times a week.  More prompt follow-up would be recommended if she should note any worsening in her breathing status.    Patient screens positive for major depression using the PHQ-9.  She does report that her mood is improving gradually.  She declines referral to mental health counseling.  She reports feeling largely optimistic.  Appetite is somewhat poor still due to residual loss of taste and smell.  She notes no fevers in the past week.    BMI:   Estimated body mass index is 30.77 kg/m  as calculated from the following:    Height as of this encounter: 5' 8\" (1.727 m).    Weight as of this encounter: 202 lb 6.4 oz (91.8 kg).   The following high BMI interventions were performed this visit: encouragement to exercise and lifestyle education regarding diet    Return in about 1 month (around 1/11/2021) for if youre continuing to need to use the albuterol regularly.    Depression Screening Follow Up  PHQ 12/11/2020   PHQ-9 Total Score 10   Q9: Thoughts of better off " dead/self-harm past 2 weeks 0     PHQ-9 DEPRESSION SCALE 12/11/2020   Little interest or pleasure in doing things 1   Feeling down, depressed, or hopeless 3   Trouble falling or staying asleep, or sleeping too much 0   Feeling tired or having little energy 2   Poor appetite or overeating 2   Feeling bad about yourself - or that you are a failure or have let yourself or your family down 0   Trouble concentrating on things, such as reading the newspaper or watching television 2   Moving or speaking so slowly that other people could have noticed. Or the opposite - being so fidgety or restless that you have been moving around a lot more than usual 0   Thoughts that you would be better off dead, or of hurting yourself in some way 0   PHQ-9 Total Score 10   If you checked off any problems, how difficult have these problems made it for you to do your work, take care of things at home, or get along with other people? Not difficult at all   Some recent data might be hidden     Follow Up Actions Taken  Crisis resource information provided in After Visit Summary  Patient has experienced a recent significant life event.  Patient counseled, no additional follow up at this time.  Patient declined referral.     Patient Instructions   In a month please consider whether you are continuing to need the albuterol inhaler on a daily basis.  If you are needing to use it frequently, please follow up for consideration of starting use of a daily inhaler that has a steroid component in it.      Return in about 1 month (around 1/11/2021) for if youre continuing to need to use the albuterol regularly.     Christiano Keith MD  [facility]   Vanessa SCHERER Joan is a 60 y.o. year old female who presents with the following concerns:     HPI   Follow up hospitalization for COVID-19 pneumonia.     Hospital Follow-up Visit:    Hospital/Nursing Home/IP Rehab Facility: [facility]  Date of  "Admission: 11/26/2020  Date of Discharge: 11/29/2020  Reason(s) for Admission: COVID-19 pneumonia, shortness of breath, acute hypoxic respiratory failure    Was your hospitalization related to COVID-19? Yes   How are you feeling today? Much better  In the past 24 hours have you had shortness of breath when speaking, walking, or climbing stairs? My breathing issues have improved  Do you have a cough? Yes, I have a cough but it's not worse  When is the last time you had a fever greater than 100? > 1 week ago  Are you having any other symptoms? Fatigue   Do you have any other stressors you would like to discuss with your provider? No    PHQ Assessment Total Score 12/11/2020   PHQ-2 Total Score 4   PHQ-9 Total Score 10   Some recent data might be hidden     Was the patient in the ICU or did the patient experience delirium during hospitalization? No  Problems taking medications regularly:  None  Medication changes since discharge: None  Problems adhering to non-medication therapy:  None    Summary of hospitalization:  [facility] hospital discharge summary reviewed     Vanessa Franco is a 60 y.o. old female with past medical history significant for hypertension, allergies anxiety presented to the emergency room with complaints of shortness of breath, abdominal pain and diagnosed with COVID PNA. She was hypoxic on admission requiring supplemental oxygen via NC. Started on remdesevir and Decadron. Her condition gradually improved and she was eventually discharged home while saturating well on room air.    Diagnostic Tests/Treatments reviewed.  Follow up needed: none  Other Healthcare Providers Involved in Patient s Care:         None  Update since discharge: improved.  Patient reports that she is still needing to use her rescue inhaler on a daily basis (uses only as needed) and finds that it always is helpful in relieving any coughing jags or feeling as if her breathing is \"tight.\" she notes that this is gradually " improving.     She notes no significant bleeding since starting on Xarelto.  She knows to stop this medicine once her supply runs out (total of 1 month of use).     Post Discharge Medication Reconciliation: discharge medications reconciled, continue medications without change.  Plan of care communicated with patient     Patient Active Problem List   Diagnosis     Essential hypertension, benign     Chronic urticaria     COVID-19     Pneumonia due to COVID-19 virus     Past Surgical History:   Procedure Laterality Date     ANKLE FRACTURE SURGERY       BREAST SURGERY        SECTION, CLASSIC       HYSTERECTOMY       REDUCTION MAMMAPLASTY Bilateral        Social History     Tobacco Use     Smoking status: Never Smoker     Smokeless tobacco: Never Used   Substance Use Topics     Alcohol use: Yes     Alcohol/week: 1.0 standard drinks     Types: 1 Glasses of wine per week     Comment: Yearly     Family History   Problem Relation Age of Onset     Dementia Mother      Breast cancer Mother      Cancer Father         Lung         Current Outpatient Medications   Medication Sig Dispense Refill     albuterol (PROVENTIL HFA) 90 mcg/actuation inhaler Inhale 2 puffs every 6 (six) hours as needed for wheezing. 2 Inhaler 1     atenoloL-chlorthalidone (TENORETIC) 100-25 mg per tablet Take 1 tablet by mouth daily. 90 tablet 3     cholecalciferol, vitamin D3, 1,000 unit (25 mcg) tablet Take 1,000 Units by mouth daily.       fexofenadine (ALLEGRA) 180 MG tablet Take 180 mg by mouth daily.       fluticasone (FLONASE) 50 mcg/actuation nasal spray 1 spray into each nostril daily as needed.        rivaroxaban ANTICOAGULANT (XARELTO) 10 mg tablet Take 1 tablet (10 mg total) by mouth daily. 30 tablet 0     cyclobenzaprine (FLEXERIL) 10 MG tablet Take 1 tablet (10 mg total) by mouth at bedtime as needed for muscle spasms. 30 tablet 1     No current facility-administered medications for this visit.      Allergies   Allergen  "Reactions     Morphine Hives     Sulfa (Sulfonamide Antibiotics) Hives     ROS:   Negative except as noted above in HPI.     Objective  /82 (Patient Site: Left Arm, Patient Position: Sitting, Cuff Size: Adult Regular)   Pulse 68   Temp 98.3  F (36.8  C)   Ht 5' 8\" (1.727 m)   Wt 202 lb 6.4 oz (91.8 kg)   LMP 12/14/1989   BMI 30.77 kg/m       General: alert/oriented to person/place. No apparent distress.   Affect: bright, pleasant/cooperative.   Neck: supple. No adenopathy noted.   CV: RR s1/s2  Respiratory: clear to auscultation bilaterally  Extremities: no clubbing/cyanosis/edema      Christiano Keith MD  Family Medicine  [facility]        "

## 2021-06-13 NOTE — PROGRESS NOTES
Clinic Care Coordination Contact  Patient stated that she is doing better today and did not need anything at this time.

## 2021-06-13 NOTE — TELEPHONE ENCOUNTER
New Appointment Needed  What is the reason for the visit:    Inpatient/ED Follow Up Appt Request  At what hospital or facility were you seen?: [facility]  What is the reason you were seen?: COVID  What date were you admitted?: 11/26/20  What date were you discharged?: 11/30/20  What was the recommended timeframe for your follow up appointment?: Was suppose to follow up when cleared.  Patient has been isolating since 11/30/20  Provider Preference: PCP only  How soon do you need to be seen?: As soon as appropriate.  Waitlist offered?: No  Okay to leave a detailed message:  Yes

## 2021-06-13 NOTE — PATIENT INSTRUCTIONS - HE
"In a month please consider whether you are continuing to need the albuterol inhaler on a daily basis.  If you are needing to use it frequently, please follow up for consideration of starting use of a daily inhaler that has a steroid component in it.      Coping with Life after COVID-19  Being in the hospital because of COVID-19 is scary. Going home can be scary, too. You may face changes to your life, the way you work or what you can eat.   It's hard to adjust to change, and it's normal to feel afraid, frustrated or even angry. These feelings usually go away over time. If your feelings don't start to get better, it's called \"adjustment disorder.\"   What signs should I look out for?  Adjustment disorder can happen to anyone in a time of stress. It makes it hard to cope with daily life. You may feel lonely or fight with loved ones, even if you're glad to be home.   Watch for these signs:    Fear or worry    Hard time focusing    Sadness or anger    Trouble talking to family or friends    Feeling like you don't fit in or isolating yourself    Problems with sleep    Drinking alcohol or taking drugs to cope  What can I do?  You can help yourself get better. Feeling you have control helps you move forward. You may wonder if you'll be able to do things you did before. Be patient. Do your best to make the most of every day. Try to build relationships, be as active as you can, eat right and keep a sense of humor. Avoid smoking and drinking too much alcohol.   Call your family doctor or clinic if you're not sure what to do. They can guide you to care or other services.  When should I get help?  Think about getting counseling if your sadness or frustration gets worse. Together with a trained counselor, you can talk about your problems adjusting to life after your hospital stay. You can come up with new ways to handle changes that give you more control.   Get help if you're thinking about hurting yourself. If you need help " "right away, call 911 or go to the nearest emergency room. You can also try the Crisis Text Line.  Crisis Text Line: 430-336 (http://www.crisistextline.org)  The Crisis Text Line serves anyone, in any crisis. It gives free, 24/7 support. Here's how it works:  1. Text HOME to 748263 from anywhere in the [place], anytime, about any type of crisis.  2. A live, trained Crisis Counselor will text you back quickly.  3. The volunteer Crisis Counselor can help you move from a \"hot moment\" to a \"cool moment.\" They can also help you work out a safety plan.  For informational purposes only. Not to replace the advice of your health care provider. Copyright   2020 [employer]. All rights reserved. Clinically reviewed by the Ambulatory COVID-19 Care Map Team. [employer] 386815 - 04/20.       "

## 2021-06-13 NOTE — PROGRESS NOTES
"Vanessa Franco is a 60 y.o. female who is being evaluated via a billable telephone visit.      The patient has been notified of following:     \"This telephone visit will be conducted via a call between you and your physician/provider. We have found that certain health care needs can be provided without the need for a physical exam.  This service lets us provide the care you need with a short phone conversation.  If a prescription is necessary we can send it directly to your pharmacy.  If lab work is needed we can place an order for that and you can then stop by our lab to have the test done at a later time.    Telephone visits are billed at different rates depending on your insurance coverage. During this emergency period, for some insurers they may be billed the same as an in-person visit.  Please reach out to your insurance provider with any questions.    If during the course of the call the physician/provider feels a telephone visit is not appropriate, you will not be charged for this service.\"    Patient has given verbal consent to a Telephone visit? Yes    What phone number would you like to be contacted at?  See Above     Patient would like to receive their AVS by AVS Preference: Raul.    Additional provider notes: This is a telephone conversation that began at 10:31 AM and ended at 10:37 AM.    The patient reports that she just got back from [place] several days ago, she has been around her niece who was diagnosed with Covid in early November but she states that she was cleared on November 18 and has not been around any other known persons who have Covid.    She reports for about 5 days now she has had a stuffy nose coughing a little bit headache sore throat she thought she had a low-grade fever but not at this time may be some chills.  She has been taking some Aleve and Tylenol and some other over-the-counter medications.  Her son also has a cold but seems like he is gotten better.    I discussed " with the patient that certainly we need to check for Covid obviously she could have another viral illness, I would not yet treat her for sinus infection she states that she has had these before.    Assessment/Plan:  1. Rhinorrhea  And other symptoms as above, presume some type of viral illness.  - Symptomatic COVID-19 Virus (CORONAVIRUS) PCR    PLAN:  1.  Covid testing ordered and proceed accordingly.        Phone call duration:  6 minutes    Carl Gasca MD

## 2021-06-14 NOTE — PROGRESS NOTES
Vanessa Franco is a 60 y.o. female who is being evaluated via a billable phone visit visit.      How would you like to obtain your AVS? MyChart.  If dropped from the video visit, the video invitation should be resent by [phone] Will anyone else be joining your video visit? No      Video Start Time:   Assessment & Plan     Sinusitis, unspecified chronicity, unspecified location    Adverse effect of vaccine, initial encounter due to Covid-19 vaccine.  I believe the symptoms as a result of the Covid vaccination that she had cough.  Has been a lot of individuals who has severe side effects associated with the vaccination.  I also informed her that even if it is viral infection it is still too early to consider treating with antibiotics.  At this point willing to have her continue to do medications for symptom control.  If there is no improvement within the next couple of days or if he gets worse I encouraged her to call to let us know so that we can consider further management options.    10 minutes spent on the date of the encounter doing chart review, history and exam, documentation and further activities as noted above           No follow-ups on file.    Judith Olivares MD  [facility]     Vanessa Franco is 60 y.o. and presents to clinic today for the following health issues   HPI   She had COVID-19 vaccination about 4 days ago.  She noted 3 days ago starting to have headache she describes as pressure as well around the face and the forehead.  There is a lot of pressure in her sinuses.  She feels congested but does not have any runny nose, no sore throat and no ear pain.  She does not have any cough.  Thought that she might have had fever at the onset but not anymore.  She is refusing medications including Tylenol and ibuprofen but noted that that has not been helpful.  She thinks that she has sinusitis and is hoping to get medications for  that.    Past Medical History:   Diagnosis Date     Anxiety      Environmental allergies      Hypertension      Past Surgical History:   Procedure Laterality Date     ANKLE FRACTURE SURGERY       BREAST SURGERY        SECTION, CLASSIC       HYSTERECTOMY  1989     REDUCTION MAMMAPLASTY Bilateral      Social History     Socioeconomic History     Marital status:      Spouse name: None     Number of children: None     Years of education: None     Highest education level: None   Occupational History     None   Social Needs     Financial resource strain: None     Food insecurity     Worry: None     Inability: None     Transportation needs     Medical: None     Non-medical: None   Tobacco Use     Smoking status: Never Smoker     Smokeless tobacco: Never Used   Substance and Sexual Activity     Alcohol use: Yes     Alcohol/week: 1.0 standard drinks     Types: 1 Glasses of wine per week     Comment: Yearly     Drug use: No     Sexual activity: None   Lifestyle     Physical activity     Days per week: None     Minutes per session: None     Stress: None   Relationships     Social connections     Talks on phone: None     Gets together: None     Attends [employer] service: None     Active member of club or organization: None     Attends meetings of clubs or organizations: None     Relationship status: None     Intimate partner violence     Fear of current or ex partner: None     Emotionally abused: None     Physically abused: None     Forced sexual activity: None   Other Topics Concern     None   Social History Narrative     None     Family History   Problem Relation Age of Onset     Dementia Mother      Breast cancer Mother      Cancer Father         Lung     Allergies   Allergen Reactions     Morphine Hives     Sulfa (Sulfonamide Antibiotics) Hives     Current Outpatient Medications   Medication Sig Dispense Refill     albuterol (PROVENTIL HFA) 90 mcg/actuation inhaler Inhale 2 puffs every 6 (six) hours as  needed for wheezing. 2 Inhaler 1     atenoloL-chlorthalidone (TENORETIC) 100-25 mg per tablet Take 1 tablet by mouth daily. 90 tablet 3     cholecalciferol, vitamin D3, 1,000 unit (25 mcg) tablet Take 1,000 Units by mouth daily.       cyclobenzaprine (FLEXERIL) 10 MG tablet Take 1 tablet (10 mg total) by mouth at bedtime as needed for muscle spasms. 30 tablet 1     fexofenadine (ALLEGRA) 180 MG tablet Take 180 mg by mouth daily.       fluticasone (FLONASE) 50 mcg/actuation nasal spray 1 spray into each nostril daily as needed.        rivaroxaban ANTICOAGULANT (XARELTO) 10 mg tablet Take 1 tablet (10 mg total) by mouth daily. (Patient not taking: Reported on 2/3/2021) 30 tablet 0     No current facility-administered medications for this visit.        Review of Systems  She noted no skin rash, has mild aches and pain, intermittent difficulty sleeping.  She does not have any ear pain and no vertigo.  There is no nausea no vomiting.      Objective       Vitals:  No vitals were obtained today due to virtual visit.    Physical Exam  Was unable to do any visual examination because of patient was unable to connect with the video.             Video-Visit Details    Type of service:  Telephone Visit was unable to use the [employer].    Video End Time (time video stopped): not used.  Originating Location (pt. Location): Home    Distant Location (provider location):  [facility]     Platform used for Video Visit: Unable to complete video visit

## 2021-06-15 NOTE — TELEPHONE ENCOUNTER
Triage call:    Pt states she is having a sore throat and post nasal drip.   Pt states she is having trouble sleeping due to the sore throat.   SX began 2/10/21, worsening.   Rates the throat pain as severe.     Pt was advised of protocol recommendation/disposition of see today in office. RN recommended virtual appointment. Patient was agreeable to scheduling a virtual appointment and was transferred to scheduling to make appointment.       Bridgett Montes De Oca RN 02/12/21 9:11 AM  [facility] Nurse Advisor      COVID 19 Nurse Triage Plan/Patient Instructions    Please be aware that novel coronavirus (COVID-19) may be circulating in the community. If you develop symptoms such as fever, cough, or SOB or if you have concerns about the presence of another infection including coronavirus (COVID-19), please contact your health care provider or visit [email or web address].     Disposition/Instructions    Virtual Visit with provider recommended. Reference Visit Selection Guide.    Thank you for taking steps to prevent the spread of this virus.  o Limit your contact with others.  o Wear a simple mask to cover your cough.  o Wash your hands well and often.    Resources    [facility]: About COVID-19: [email or web address]    CDC: What to Do If You're Sick: www.cdc.gov/coronavirus/2019-ncov/about/steps-when-sick.html    CDC: Ending Home Isolation: www.cdc.gov/coronavirus/2019-ncov/hcp/disposition-in-home-patients.html     CDC: Caring for Someone: www.cdc.gov/coronavirus/2019-ncov/if-you-are-sick/care-for-someone.html     [facility]: Interim Guidance for Hospital Discharge to Home: www.health.[facility]/diseases/coronavirus/hcp/hospdischarge.pdf    [facility] clinical trials (COVID-19 research studies): [email or web address].[email or web address]/umn-clinical-trials     Below are the COVID-19 hotlines at the [facility] ([facility]). Interpreters are available.   o For health questions: Call 088-591-5509 or  1-310.417.7747 (7 a.m. to 7 p.m.)  o For questions about schools and childcare: Call 016-686-8519 or 1-471.754.6219 (7 a.m. to 7 p.m.)     Reason for Disposition    SEVERE sore throat pain    Additional Information    Negative: SEVERE difficulty breathing (e.g., struggling for each breath, speaks in single words)    Negative: Sounds like a life-threatening emergency to the triager    Negative: Throat culture results, call about    Negative: Productive cough is the main symptom    Negative: Runny nose is the main symptom    Negative: Unable to open mouth completely    Negative: Drooling or spitting out saliva (because can't swallow)    Negative: Patient sounds very sick or weak to the triager    Negative: Drinking very little and has signs of dehydration (e.g., no urine > 12 hours, very dry mouth, very lightheaded)    Negative: Refuses to drink anything for > 12 hours    Negative: Fever > 103 F (39.4 C)    Negative: Difficulty breathing (per caller) but not severe    Protocols used: SORE THROAT-A-OH

## 2021-06-15 NOTE — PROGRESS NOTES
ASSESSMENT:  1. Fissure in ano  - R2 - LIDO/NIFED 1.5/0.3%; Apply 1 gram 2-3 times daily.  Dispense: 90 g; Refill: 0  - Ambulatory referral to Gastroenterology  - hydrocortisone (ANUSOL-HC) 2.5 % rectal cream; Apply rectally 2 times daily  Dispense: 30 g; Refill: 1      PLAN:  I did not do any physical exam on the patient today.  But I think that she will benefit from a referral to see the gastroenterologist again being that she is continuing to have a little discomfort with bowel movement.  But we did discuss other possible treatments and I did give a prescription for lidocaine with nifedipine which I hope will be able to help.  I also given a prescription for hydrocortisone cream unsure of which 1 of them will be covered by insurance.  I do hope that she will improve and I did encourage her to continue to do sitz baths.    Orders Placed This Encounter   Procedures     Ambulatory referral to Gastroenterology     Referral Priority:   Routine     Referral Type:   Consultation     Referral Reason:   Evaluation and Treatment     Referral Location:   [facility]     Requested Specialty:   Gastroenterology     Number of Visits Requested:   1     There are no discontinued medications.    No Follow-up on file.      CHIEF COMPLAINT:  Chief Complaint   Patient presents with     Abdominal Pain     abdominal pain around BM's- clear liquid at certain times that comes from anus- has blood at times- did cologuard and sent it back today       HISTORY OF PRESENT ILLNESS:  Vanessa is a 57 y.o. female presenting to the clinic today for follow-up of abdominal pain that is noted around the lower abdomen as well as and now pain.  She is known to have a history of fissure in ano that has been treated previously.  She noted that previous treatment has not been very helpful.  She continues to have a lot of pain around the area with bowel movements.  She noted that sometimes he becomes very difficult to even have a  bowel movement.  Sometimes she will be in the bathroom and continue doing sitz baths for a long period of time and that is beginning to affect her job.  She noted previously having seen a gastroenterologist along time ago and is interested in considering that.  She did try to do a color guard for colon cancer screening but that did fail and she will to an order sample.    REVIEW OF SYSTEMS:   She denied having any fevers or chills.  He has no nausea vomiting.  She noted that she is not constipated at this point.  Her appetite is low because of pain when she eats and she has to go to have a bowel movement.  She denied having any urinary symptoms.  She denied any dizziness or any other major concerns or symptoms.  All other systems are negative.    PFSH:  Reviewed, as below.    History   Smoking Status     Never Smoker   Smokeless Tobacco     Never Used       Family History   Problem Relation Age of Onset     Dementia Mother      Breast cancer Mother      No Medical Problems Sister      No Medical Problems Brother      Cancer Father      Lung       Social History     Social History     Marital status:      Spouse name: N/A     Number of children: N/A     Years of education: N/A     Occupational History     Not on file.     Social History Main Topics     Smoking status: Never Smoker     Smokeless tobacco: Never Used     Alcohol use 0.6 oz/week     1 Glasses of wine per week      Comment: Yearly     Drug use: No     Sexual activity: Not on file     Other Topics Concern     Not on file     Social History Narrative       Past Surgical History:   Procedure Laterality Date     ANKLE FRACTURE SURGERY       BREAST SURGERY        SECTION, CLASSIC       HYSTERECTOMY         Allergies   Allergen Reactions     Morphine      Sulfa (Sulfonamide Antibiotics)        Active Ambulatory Problems     Diagnosis Date Noted     No Active Ambulatory Problems     Resolved Ambulatory Problems     Diagnosis Date Noted     No  Resolved Ambulatory Problems     Past Medical History:   Diagnosis Date     Anxiety      Environmental allergies      Hypertension        Current Outpatient Prescriptions   Medication Sig Dispense Refill     albuterol (PROVENTIL HFA) 90 mcg/actuation inhaler Inhale 2 puffs every 6 (six) hours as needed for wheezing. 1 Inhaler 0     atenolol-chlorthalidone (TENORETIC) 100-25 mg per tablet Take 1 tablet by mouth daily. 90 tablet 1     citalopram (CELEXA) 20 MG tablet Take 1 tablet (20 mg total) by mouth daily. 90 tablet 1     fexofenadine (ALLEGRA) 180 MG tablet Take 180 mg by mouth daily.       fluticasone (FLONASE) 50 mcg/actuation nasal spray 1 spray into each nostril daily.       potassium chloride (KLOR-CON) 10 MEQ CR tablet Take 1 tablet (10 mEq total) by mouth daily. 90 tablet 0     hydrocortisone (ANUSOL-HC) 2.5 % rectal cream Apply rectally 2 times daily 30 g 1     polyethylene glycol (GLYCOLAX) 17 gram/dose powder Take 17 g by mouth daily. 850 g 0     predniSONE (DELTASONE) 20 MG tablet TK 2 TS PO QD WITH A MEAL FOR 5 DAYS  0     R2 - LIDO/NIFED 1.5/0.3% Apply 1 gram 2-3 times daily. 90 g 0     ranitidine (ZANTAC) 150 MG tablet Take 1 tablet (150 mg total) by mouth 2 (two) times a day. 10 tablet 0     vitamin E 400 UNIT capsule Take 400 Units by mouth daily.       No current facility-administered medications for this visit.        VITALS:  Vitals:    01/19/18 1645   BP: 138/82   Patient Site: Left Arm   Patient Position: Sitting   Cuff Size: Adult Regular   Pulse: 60   Weight: 184 lb 8 oz (83.7 kg)     Wt Readings from Last 3 Encounters:   01/19/18 184 lb 8 oz (83.7 kg)   09/25/17 187 lb 3.2 oz (84.9 kg)   06/06/17 185 lb 14.4 oz (84.3 kg)     Body mass index is 29.33 kg/(m^2).    PHYSICAL EXAM:  General Appearance: Alert, cooperative ,appears to be in some distress due to pain, appears stated age she is afebrile and not pale.  HEENT: Pupils are equal and reactive, extraocular motions is normal. Neck is  supple no notable thyromegaly.  External ears are normal.  Lungs: Respirations unlabored  Heart: Peripheral pulsations are normal.    Musculoskeletal: Normal range of motion.  Normal gait.  Neurologic:  Alert and oriented times 3.  Psychiatric: Normal mood and affect.    MEDICATIONS:  Current Outpatient Prescriptions   Medication Sig Dispense Refill     albuterol (PROVENTIL HFA) 90 mcg/actuation inhaler Inhale 2 puffs every 6 (six) hours as needed for wheezing. 1 Inhaler 0     atenolol-chlorthalidone (TENORETIC) 100-25 mg per tablet Take 1 tablet by mouth daily. 90 tablet 1     citalopram (CELEXA) 20 MG tablet Take 1 tablet (20 mg total) by mouth daily. 90 tablet 1     fexofenadine (ALLEGRA) 180 MG tablet Take 180 mg by mouth daily.       fluticasone (FLONASE) 50 mcg/actuation nasal spray 1 spray into each nostril daily.       potassium chloride (KLOR-CON) 10 MEQ CR tablet Take 1 tablet (10 mEq total) by mouth daily. 90 tablet 0     hydrocortisone (ANUSOL-HC) 2.5 % rectal cream Apply rectally 2 times daily 30 g 1     polyethylene glycol (GLYCOLAX) 17 gram/dose powder Take 17 g by mouth daily. 850 g 0     predniSONE (DELTASONE) 20 MG tablet TK 2 TS PO QD WITH A MEAL FOR 5 DAYS  0     R2 - LIDO/NIFED 1.5/0.3% Apply 1 gram 2-3 times daily. 90 g 0     ranitidine (ZANTAC) 150 MG tablet Take 1 tablet (150 mg total) by mouth 2 (two) times a day. 10 tablet 0     vitamin E 400 UNIT capsule Take 400 Units by mouth daily.       No current facility-administered medications for this visit.

## 2021-06-15 NOTE — PROGRESS NOTES
"Assessment/plan   Vanessa Franco is a 60 y.o. female  who is being evaluated via a billable telephone visit.      The patient has been notified of following:     \"This telephone visit will be conducted via a call between you and your physician/provider. We have found that certain health care needs can be provided without the need for a physical exam.  This service lets us provide the care you need with a short phone conversation.  If a prescription is necessary we can send it directly to your pharmacy.  If lab work is needed we can place an order for that and you can then stop by our lab to have the test done at a later time.    If during the course of the call the physician/provider feels a telephone visit is not appropriate, you will not be charged for this service.\"     Patient has given verbal consent to a Telephone visit? Yes    Chief Complaint   Patient presents with     Sore Throat     f/u triage         Vanessa was seen today for sore throat.    Diagnoses and all orders for this visit:    Sore throat  Presumptive treatment for strep throat , but adv to be seen if no improvement in next 48 hr   -     azithromycin (ZITHROMAX) 250 MG tablet; Take 1 tablet (250 mg total) by mouth daily for 5 days. Take 500 mg (2 x 250 mg tablets) on day 1 followed by 250 mg (1 tablet) on days 2-5.    Mild major depression (H)  Comments:  currently not on med last PHQ 10, patient feel she is doing well    Subacute maxillary sinusitis  Symptoms of sore throat could be just PND , but patient complains of lot sinus pressure and headache so will treat presumptively   -     azithromycin (ZITHROMAX) 250 MG tablet; Take 1 tablet (250 mg total) by mouth daily for 5 days. Take 500 mg (2 x 250 mg tablets) on day 1 followed by 250 mg (1 tablet) on days 2-5.        Subjective:      HPI: Vanessa Franco is a 60 y.o. female who we talked over the phone over her current concerns .     Upper Respiratory Infection: Patient complains of " symptoms of a URI, possible sinusitis. Symptoms include congestion, sore throat and headach postnasal drip. Onset of symptoms was 1 week ago, gradually worsening since that time. She also c/o achiness and post nasal drip for the past 2 days .  She is drinking plenty of fluids. Evaluation to date: none. Treatment to date: cough suppressants, decongestants and nasal steroids. Patient recently had covid vaccine with some side effects , history of covid pneumonia in         I have personally  went over  patient's allergies, medications, past medical history, family history, social history, rooming notes and problem list in detail and updated the patient record as necessary.      Past Medical History:   Diagnosis Date     Anxiety      Environmental allergies      Hypertension      Past Surgical History:   Procedure Laterality Date     ANKLE FRACTURE SURGERY       BREAST SURGERY        SECTION, CLASSIC       HYSTERECTOMY       REDUCTION MAMMAPLASTY Bilateral      Morphine and Sulfa (sulfonamide antibiotics)  Current Outpatient Medications   Medication Sig Dispense Refill     acetaminophen (TYLENOL) 325 MG tablet Take 650 mg by mouth every 6 (six) hours as needed for pain.       albuterol (PROVENTIL HFA) 90 mcg/actuation inhaler Inhale 2 puffs every 6 (six) hours as needed for wheezing. 2 Inhaler 1     atenoloL-chlorthalidone (TENORETIC) 100-25 mg per tablet Take 1 tablet by mouth daily. 90 tablet 3     cholecalciferol, vitamin D3, 1,000 unit (25 mcg) tablet Take 1,000 Units by mouth daily.       cyclobenzaprine (FLEXERIL) 10 MG tablet Take 1 tablet (10 mg total) by mouth at bedtime as needed for muscle spasms. 30 tablet 1     fexofenadine (ALLEGRA) 180 MG tablet Take 180 mg by mouth daily.       fluticasone (FLONASE) 50 mcg/actuation nasal spray 1 spray into each nostril daily as needed.        ibuprofen (ADVIL,MOTRIN) 200 MG tablet Take 200 mg by mouth every 6 (six) hours as needed for pain.        rivaroxaban ANTICOAGULANT (XARELTO) 10 mg tablet Take 1 tablet (10 mg total) by mouth daily. 30 tablet 0     azithromycin (ZITHROMAX) 250 MG tablet Take 1 tablet (250 mg total) by mouth daily for 5 days. Take 500 mg (2 x 250 mg tablets) on day 1 followed by 250 mg (1 tablet) on days 2-5. 6 tablet 0     No current facility-administered medications for this visit.      Family History   Problem Relation Age of Onset     Dementia Mother      Breast cancer Mother      Cancer Father         Lung       Patient Active Problem List   Diagnosis     Essential hypertension, benign     Chronic urticaria     COVID-19     Pneumonia due to COVID-19 virus     Mild major depression (H)       Review of Systems   12 point comprehensive review of systems was negative except as noted and HPI     Social History     Social History Narrative     Not on file       Objective:   There were no vitals filed for this visit.  Patient sounds in no acute distress does sound as she has nasal congestion  and some sore throat  This note has been dictated using voice recognition software. Any grammatical or context distortions are unintentional and inherent to the software  Rolanda Shore MD

## 2021-06-16 PROBLEM — J12.82 PNEUMONIA DUE TO COVID-19 VIRUS: Status: ACTIVE | Noted: 2020-11-26

## 2021-06-16 PROBLEM — U07.1 COVID-19: Status: ACTIVE | Noted: 2020-11-26

## 2021-06-16 PROBLEM — F32.0 MILD MAJOR DEPRESSION (H): Status: ACTIVE | Noted: 2021-02-12

## 2021-06-16 PROBLEM — U07.1 PNEUMONIA DUE TO COVID-19 VIRUS: Status: ACTIVE | Noted: 2020-11-26

## 2021-06-16 PROBLEM — L50.8 CHRONIC URTICARIA: Status: ACTIVE | Noted: 2020-11-13

## 2021-06-17 NOTE — PATIENT INSTRUCTIONS - HE
Patient Instructions by Feliciano Bernardo PT at 1/23/2019  5:45 PM     Author: Feliciano Bernardo PT Service: -- Author Type: Physical Therapist    Filed: 1/23/2019  6:11 PM Encounter Date: 1/23/2019 Status: Signed    : Feliciano Bernardo PT (Physical Therapist)        BRACE MARCHING    While lying on your back with your knees bent,  slowly raise up one foot a few inches and then set it back down.  Next, perform on your other leg.  Use your stomach muscles to keep your spine from moving.

## 2021-06-17 NOTE — PATIENT INSTRUCTIONS - HE
"Patient Instructions by Feliciano Bernardo PT at 1/9/2019  5:45 PM     Author: Feliciano Bernardo PT Service: -- Author Type: Physical Therapist    Filed: 1/9/2019  6:08 PM Encounter Date: 1/9/2019 Status: Signed    : Feliciano Bernardo PT (Physical Therapist)        ABDOMINAL BRACING    While lying on your back, tighten your stomach muscles as you draw your navel down towards the floor. Try not to over tighten the stomach muscles, just a gentle contraction.     BRACE MARCHING    While lying on your back with your knees bent,  slowly raise up one foot a few inches and then set it back down.  Next, perform on your other leg.  Use your stomach muscles to keep your spine from moving.    BRACE - SINGLE KNEE EXTENSION    While lying on your back with knees bent, straighten out one knee while keeping the leg off the ground. Hold as indicated, then return to original position. Next, perform on the other leg.    Use your stomach muscles to keep your spine from moving the entire time.          BRIDGING    While lying on your back, tighten your lower abdominals, squeeze your buttocks and then raise your buttocks off the floor/bed as creating a \"Bridge\" with your body.            "

## 2021-06-17 NOTE — PATIENT INSTRUCTIONS - HE
Patient Instructions by Raisa Tucker FNP at 3/14/2019  1:50 PM     Author: Raisa Tucker FNP Service: -- Author Type: Nurse Practitioner    Filed: 3/14/2019  1:55 PM Encounter Date: 3/14/2019 Status: Signed    : Raisa Tucker FNP (Nurse Practitioner)       Patient Education     Acute Sinusitis    Acute sinusitis is irritation and swelling of the sinuses. It is usually caused by a viral infection after a common cold. Your doctor can help you find relief.  What is acute sinusitis?  Sinuses are air-filled spaces in the skull behind the face. They are kept moist and clean by a lining of mucosa. Things such as pollen, smoke, and chemical fumes can irritate the mucosa. It can then swell up. As a response to irritation, the mucosa makes more mucus and other fluids. Tiny hairlike cilia cover the mucosa. Cilia help carry mucus toward the opening of the sinus. Too much mucus may cause the cilia to stop working. This blocks the sinus opening. A buildup of fluid in the sinuses then causes pain and pressure. It can also encourage bacteria to grow in the sinuses.  Common symptoms of acute sinusitis  You may have:    Facial soreness pain    Headache    Fever    Fluid draining in the back of the throat (postnasal drip)    Congestion    Drainage that is thick and colored, instead of clear    Cough  Diagnosing acute sinusitis  Your doctor will ask about your symptoms and health history. He or she will look at your ear, nose, and throat. You usually won't need to have X-rays taken.    The doctor may take a sample of mucus to check for bacteria. If you have sinusitis that keeps coming back, you may need imaging tests such as X-rays or CAT scans. This will help your doctor check for a structural problem that may be causing the infection.  Treating acute sinusitis  Treatment is aimed at unblocking the sinus opening and helping the cilia work again. You may need to take antihistamine and decongestant medicine.  These can reduce inflammation and decrease the amount of fluid your sinuses make. If you have a bacterial infection, you will need to take antibiotic medicine for 10 to 14 days. Take this medicine until it is gone, even if you feel better.  Date Last Reviewed: 10/1/2016    4490-3437 [employer] [address] [place] [address], [place] [address]. All rights reserved. This information is not intended as a substitute for professional medical care. Always follow your healthcare professional's instructions.

## 2021-06-17 NOTE — PROGRESS NOTES
ASSESSMENT/PLAN:      Anxiety, depression  Pleasant 57-year-old female with known depression and anxiety had a disruption in her medication whereby she was rationing citalopram 20 mg.  I believe this has caused a disruption in her mood and anxiety symptoms.  I printed a prescription for citalopram 40 mg and instructed her to take 1 tablet daily.  Her daughter was on speaker phone and this was reviewed with the daughter.  I recommend psychotherapy.  Motivational interviewing was utilized today.  We did mild cognitive behavioral therapy.  We contracted for safety.  Follow-up with Dr. Olivares in 1 month.  She verbalized understanding and agreed with the plan  -     citalopram (CELEXA) 40 MG tablet; Take 1 tablet (40 mg total) by mouth daily.  Dispense: 90 tablet; Refill: 0  -     Ambulatory referral to Psychology    Hypertension, essential, benign  refilled  -     atenolol-chlorthalidone (TENORETIC) 100-25 mg per tablet; Take 1 tablet by mouth daily.  Dispense: 90 tablet; Refill: 0      Orders Placed This Encounter   Procedures     Ambulatory referral to Psychology     Referral Priority:   Routine     Referral Type:   Behavioral Health     Referral Reason:   Evaluation and Treatment     Number of Visits Requested:   1       CHIEF COMPLAINT:  Chief Complaint   Patient presents with     Depression     feeling very depressed and sad. Not able think, sleep       HISTORY OF PRESENT ILLNESS:  Vanessa is a 57 y.o. female presenting to the clinic today for a follow up for depression. She has a history of depression for the past 18 years or so. She has been taking citalopram since then. She has felt like she has had worsening mood and anxiety in the past couple of weeks. She thought that she was going to lose her health insurance, and so she was only sporadically taking her citalopram so she could save the medicine if her insurance stopped. She has kept her insurance, so this is not an issue anymore. In the past few weeks, she  has had worsening worrying and feeling down. She felt like she could control her thoughts and mood until this week. She did restart her citalopram this week. She notes that she took 2 tablets yesterday because she felt one was not working. She does find some comfort in her josi. Denies suicidal or homicidal ideations.     Little interest or pleasure in doing things: Nearly every day  Feeling down, depressed, or hopeless: Nearly every day  Trouble falling or staying asleep, or sleeping too much: Nearly every day  Feeling tired or having little energy: Nearly every day  Poor appetite or overeating: Nearly every day  Feeling bad about yourself - or that you are a failure or have let yourself or your family down: Nearly every day  Trouble concentrating on things, such as reading the newspaper or watching television: Nearly every day  Moving or speaking so slowly that other people could have noticed. Or the opposite - being so fidgety or restless that you have been moving around a lot more than usual: Nearly every day  Thoughts that you would be better off dead, or of hurting yourself in some way: Not at all  PHQ-9 Total Score: 24  If you checked off any problems, how difficult have these problems made it for you to do your work, take care of things at home, or get along with other people?: Somewhat difficult    Feeling nervous, anxious or on edge: 2  Not being able to stop or control worry: 3  Worrying too much about different things: 1  Trouble relaxin  Being so restless that is is hard to sit still: 1  Becoming easily annnoyed or irritable: 1  Feeling afraid as if something awful might happen: 2  ALEISHA-7 Total: 11  How difficult did these problems make it for you to do your work, take care of things at home or get along with other people? : Somewhat difficult    Hypertension: Her blood pressure is stable today at 124/82. She is still taking atenolol-chlorthalidone 100-25mg.     REVIEW OF SYSTEMS:   Constitutional:  Negative.   HENT: Negative.   Eyes: Negative.   Respiratory: Negative.   Cardiovascular: Negative.   Gastrointestinal: Negative.   Endocrine: Negative.   Genitourinary: Negative.   Musculoskeletal: Negative.   Skin: Negative.   Allergic/Immunologic: Negative.   Neurological: Negative.   Hematological: Negative.    All other systems are negative.    PFSH:  No new history.     TOBACCO USE:  History   Smoking Status     Never Smoker   Smokeless Tobacco     Never Used       VITALS:  Vitals:    04/05/18 1033   BP: 124/82   Pulse: 72   Weight: 180 lb 1 oz (81.7 kg)     Wt Readings from Last 3 Encounters:   04/05/18 180 lb 1 oz (81.7 kg)   01/19/18 184 lb 8 oz (83.7 kg)   09/25/17 187 lb 3.2 oz (84.9 kg)       PHYSICAL EXAM:  Constitutional: Patient is oriented to person, place, and time. Patient appears well-developed and well-nourished. No distress.   Cardiovascular: Normal rate.  Pulmonary/Chest: Effort normal. No respiratory distress.   Neurological: Patient is alert and oriented to person, place, and time.  MENTAL STATUS EXAM  BEHAVIOR/APPEARANCE    Inappropriate sexual behavior: Absent    General appearance: Awake, alert, appropriately groomed, in no acute distress.    Gait: no imbalance   Social Skills: appropriate     Eye Contact:  Good    Motor Activity: no psychomotor retardation, restrained and sterotypic   SPEECH: Normal, rate, rhythm, and volume, adequate responses   ORIENTATION: Patient is oriented x3   MOOD & AFFECT    Mood: depressed   Affect: tearful  THOUGHT PROCESS    Thought Process: adequate rate    Language: English   Fund of Knowledge: average    Thought Content: adequate   Association: adequate   Judgement: adequate   Insight: adequate   COGNITIVE EXAM    Cognitive Function: Oriented to person, place, time, and situation.    Recent/Remote Memory: not assessed    Intelligence: not assessed    Attention Span: not  assessed    Concentration: not assessed    Computation: not assessed       Results for  orders placed or performed in visit on 02/13/17   Hemoglobin   Result Value Ref Range    Hemoglobin 12.9 12.0 - 16.0 g/dL   Potassium   Result Value Ref Range    Potassium 4.0 3.5 - 5.0 mmol/L           ADDITIONAL HISTORY SUMMARIZED (2): None.  DECISION TO OBTAIN EXTRA INFORMATION (1): None.   RADIOLOGY TESTS (1): None.  LABS (1): None.  MEDICINE TESTS (1): None.  INDEPENDENT REVIEW (2 each): None.     The visit lasted a total of 25 minutes face to face with the patient. Over 50% of the time was spent counseling and educating the patient about depression/anxiety management.    I, Caridad Bautista, am scribing for and in the presence of, Dr. Melvin.    IShayne MD , personally performed the services described in this documentation, as scribed by Caridad Bautista in my presence, and it is both accurate and complete.    MEDICATIONS:  Current Outpatient Prescriptions   Medication Sig Dispense Refill     albuterol (PROVENTIL HFA) 90 mcg/actuation inhaler Inhale 2 puffs every 6 (six) hours as needed for wheezing. 1 Inhaler 0     atenolol-chlorthalidone (TENORETIC) 100-25 mg per tablet Take 1 tablet by mouth daily. 90 tablet 0     fexofenadine (ALLEGRA) 180 MG tablet Take 180 mg by mouth daily.       fluticasone (FLONASE) 50 mcg/actuation nasal spray 1 spray into each nostril daily.       hydrocortisone (ANUSOL-HC) 2.5 % rectal cream Apply rectally 2 times daily 30 g 1     polyethylene glycol (GLYCOLAX) 17 gram/dose powder Take 17 g by mouth daily. 850 g 0     potassium chloride (KLOR-CON) 10 MEQ CR tablet Take 1 tablet (10 mEq total) by mouth daily. 90 tablet 0     R2 - LIDO/NIFED 1.5/0.3% Apply 1 gram 2-3 times daily. 90 g 0     citalopram (CELEXA) 40 MG tablet Take 1 tablet (40 mg total) by mouth daily. 90 tablet 0     predniSONE (DELTASONE) 20 MG tablet TK 2 TS PO QD WITH A MEAL FOR 5 DAYS  0     ranitidine (ZANTAC) 150 MG tablet Take 1 tablet (150 mg total) by mouth 2 (two) times a  day. 10 tablet 0     vitamin E 400 UNIT capsule Take 400 Units by mouth daily.       No current facility-administered medications for this visit.        Total data points: 0

## 2021-06-18 NOTE — PATIENT INSTRUCTIONS - HE
Patient Instructions by Rolanda Shore MD at 2/12/2021 10:40 AM     Author: Rolanda Shore MD Service: -- Author Type: Physician    Filed: 2/12/2021 10:51 AM Encounter Date: 2/12/2021 Status: Signed    : Rolanda Shore MD (Physician)         Patient Education     When to Use Antibiotics   Antibiotics are medicines used to treat infections caused by bacteria. They dont work for illnesses caused by viruses or an allergic reaction. In fact, taking antibiotics for reasons other than a bacterial infection can cause problems. For example, you may have side effects from the medicine. And if you really need an antibiotic, it may not work well.                                                                                                                                              When antibiotics wont help  Your healthcare provider wont usually prescribe antibiotics for the following conditions. You can help by not asking for them if you have:     A cold. This type of illness is caused by a virus. It can cause a runny nose, stuffed-up nose, sneezing, coughing, headache, mild body aches, and low fever. A cold gets better on its own in a few days to a week.    The flu (influenza). This is a respiratory illness caused by a virus. The flu usually goes away on its own in a week or so. It can cause fever, body aches, sore throat, and fatigue.    Bronchitis. This is an infection in the lungs most often caused by a virus. You may have coughing, phlegm, body aches, and a low fever. A common type of bronchitis is known as a chest cold (acute bronchitis). This often happens after you have a respiratory infection like a common cold. Bronchitis can take weeks to go away, but antibiotics usually dont help.    Most sore throats. Sore throats are most often caused by viruses. Your throat may feel scratchy or achy, and it may hurt to swallow. You may also have a low fever and body aches. A sore throat usually gets better in a few  days.    Most ear infections. An ear infection may be caused by a virus or bacteria. It causes pain in the ear. Antibiotics usually dont help, and the infection goes away on its own.    Most sinus infections (sinusitis). This kind of infection causes sinus pain and swelling, and a runny nose. In most cases, sinusitis goes away on its own, and antibiotics dont make recovery quicker.    Allergic rhinitis. This is a set of symptoms caused by an allergic reaction. You may have sneezing, a runny nose, itchy or watery eyes, or a sore throat. Allergies are not treated with antibiotics.    Low fever. A mild fever thats less than 100.4 F (38 C) most likely doesnt need treatment with antibiotics.   When antibiotics can help   Antibiotics can be used to treat:                                                       Strep throat. This is a throat infectioncaused by a certain type of bacteria. Symptoms of strep throat include a sore throat, white patches on the tonsils, red spots on the roof of the mouth, fever, body aches, and nausea and vomiting.    Urinary tract infection (UTI). This is a bacterial infection of the bladder and the tube that takes urine out of the body. It can cause burning pain and urine thats cloudy or tinted with blood. UTIs are very common. Antibiotics usually help treat these infections.    Some ear infections. In some cases, a healthcare provider may prescribe antibiotics for an ear infection. You may need a test to show whats causing the ear infection.    Some sinus infections. In some cases, yourhealthcare provider may give you antibiotics. He or she may first need to make sure your symptoms arent caused by a virus, fungus, allergies, or air pollutants such as smoke.   Your doctor may also recommend antibiotics if you have a condition that can affect your immune system, such as diabetes or cancer.   Self-care at home   If your infection cant be treated with antibiotics, you can take other steps to feel  better. Try the remedies below. In general:     Rest and sleep as much as needed.    Drink water and other clear fluids.    Dont smoke, and avoid smoke from other people.    Use over-the-counter medicine such as acetaminophen to ease pain or fever, as directed by your healthcare provider.   To treat sinus pain or nasal congestion:     Put a warm, moist washcloth on your face where you feel sinus pain or pressure.    Use a nasal spray with medicine or saline, as directed by your healthcare provider.    Breathe in steam from a hot shower.    Use a humidifier or cool mist vaporizer.   To quiet a cough:     Use a humidifier or cool mist vaporizer.    Breathe in steam from a hot shower.    Use cough lozenges.   To sooth a sore throat:     Suck on ice chips, popsicles, or lozenges.    Use a sore throat spray.    Use a humidifier or cool mist vaporizer.    Gargle with saltwater.    Drink warm liquids.   To ease ear pain:     Hold a warm, moist washcloth on the ear for 10 minutes at a time.  Date Last Reviewed: 9/1/2016 2000-2019 [employer]. [address] [place] [address]. All rights reserved. This information is not intended as a substitute for professional medical care. Always follow your healthcare professional's instructions.           Patient Education     Sinusitis (Antibiotic Treatment)    The sinuses are air-filled spaces within the bones of the face. They connect to the inside of the nose. Sinusitis is an inflammation of the tissue that lines the sinuses. Sinusitis can occur during a cold. It can also happen due to allergies to pollens and other particles in the air. Sinusitis can cause symptoms of sinus congestion and a feeling of fullness. A sinus infection causes fever, headache, and facial pain. There is often green or yellow fluid draining from the nose or into the back of the throat (post-nasal drip). You have been given antibiotics to treat this condition.  Home care    Take the  full course of antibiotics as instructed. Do not stop taking them, even when you feel better.    Drink plenty of water, hot tea, and other liquids. This may help thin nasal mucus. It also may help your sinuses drain fluids.    Heat may help soothe painful areas of your face. Use a towel soaked in hot water. Or,  the shower and direct the warm spray onto your face. Using a vaporizer along with a menthol rub at night may also help soothe symptoms.     An expectorant with guaifenesin may help thin nasal mucus and help your sinuses drain fluids.    You can use an over-the-counter decongestant, unless a similar medicine was prescribed to you. Nasal sprays work the fastest. Use one that contains phenylephrine or oxymetazoline. First blow your nose gently. Then use the spray. Do not use these medicines more often than directed on the label. If you do, your symptoms may get worse. You may also take pills that contain pseudoephedrine. Dont use products that combine multiple medicines. This is because side effects may be increased. Read labels. You can also ask the pharmacist for help. (People with high blood pressure should not use decongestants. They can raise blood pressure.)    Over-the-counter antihistamines may help if allergies contributed to your sinusitis.      Do not use nasal rinses or irrigation during an acute sinus infection, unless your healthcare provider tells you to. Rinsing may spread the infection to other areas in your sinuses.    Use acetaminophen or ibuprofen to control pain, unless another pain medicine was prescribed to you. If you have chronic liver or kidney disease or ever had a stomach ulcer, talk with your healthcare provider before using these medicines. (Aspirin should never be taken by anyone under age 18 who is ill with a fever. It may cause severe liver damage.)    Don't smoke. This can make symptoms worse.  Follow-up care  Follow up with your healthcare provider or our staff if you  are better in 1 week.  When to seek medical advice  Call your healthcare provider if any of these occur:    Facial pain or headache that gets worse    Stiff neck    Unusual drowsiness or confusion    Swelling of your forehead or eyelids    Vision problems, such as blurred or double vision    Fever of 100.4 F (38 C) or higher, or as directed by your healthcare provider    Seizure    Breathing problems    Symptoms don't go away in 10 days  Prevention  Here are steps you can take to help prevent an infection:    Keep good hand washing habits.    Dont have close contact with people who have sore throats, colds, or other upper respiratory infections.    Dont smoke, and stay away from secondhand smoke.    Stay up to date with of your vaccines.  Date Last Reviewed: 11/1/2017 2000-2017 [employer]. [address] [place] [address], [place] [address]. All rights reserved. This information is not intended as a substitute for professional medical care. Always follow your healthcare professional's instructions.

## 2021-06-18 NOTE — PATIENT INSTRUCTIONS - HE
Patient Instructions by Christiano Keith MD at 11/13/2020 11:40 AM     Author: Chrsitiano Keith MD Service: -- Author Type: Physician    Filed: 11/13/2020 12:15 PM Encounter Date: 11/13/2020 Status: Addendum    : Christiano Keith MD (Physician)    Related Notes: Original Note by Christiano Keith MD (Physician) filed at 11/13/2020 12:09 PM       Please continue on your current blood pressure medications, unchanged.      Please return to clinic sometime within the next couple of months to have your blood tests rechecked.      For your right hip I would recommend starting with some home exercises and use of as needed cyclobenzaprine (muscle relaxant).  If the pain is not improving over the next 2 weeks, please let me know and we will help you schedule with physical therapy.

## 2021-06-21 NOTE — PROGRESS NOTES
ASSESSMENT/PLAN:  Chronic midline back pain, unspecified back location  -     Ambulatory referral to Physical Therapy    Pain of both hip joints  -     Ambulatory referral to Physical Therapy    Bilateral knee pain  -     Ambulatory referral to Physical Therapy    Hypokalemia  -     Basic Metabolic Panel    Essential hypertension  Blood pressure is elevated today.  Patient states that her blood pressure values at home are normal.  I would like her to continue with atenolol chlorthalidone 100 per 25 mg and follow-up with her primary care provider in 1 month.  -     Basic Metabolic Panel    Other orders  -     Influenza, Seasonal,Quad Inj, 36+ MOS    SUBJECTIVE:    Vanessa Franco is a 58 y.o. female who came in today complaining of back pain and requesting a flu shot.      Patient has been experiencing back pain in the thoracic region down to the lumbar region intermittently for years but it has become constant as of late. She grades the severity of pain as a 10/10 and explains that it does not change when she is sitting vs. standing. Patient has not had any recent injury but works at a  where she is constantly bending.  Previously Tylenol and ibuprofen would help with the pain but recently over-the-counter analgesics has not been helping.  She denies any numbness or weakness of her extremities.  She denies any bowel bladder dysfunction.    Patient fell down the stairs 3 weeks ago and had a swollen left ankle which will swell occasionally with weight-bearing.     Patient blood pressure is elevated today.  She states that she consistently takes atenolol chlorthalidone 100 per 25 mg tablet.  She thinks that her blood pressures at home are within normal range.  She denies chest pain or shortness of breath.    Review of Systems (except those mentioned above)  Constitutional: Negative.   HENT: Negative.   Eyes: Negative.   Respiratory: Negative.   Cardiovascular: Negative.   Gastrointestinal: Negative.    Endocrine: Negative.   Genitourinary: Negative.   Musculoskeletal: Negative.   Skin: Negative.   Allergic/Immunologic: Negative.   Neurological: Negative.   Hematological: Negative.   Psychiatric/Behavioral: Negative.     There are no active problems to display for this patient.    Allergies   Allergen Reactions     Morphine      Sulfa (Sulfonamide Antibiotics)      Current Outpatient Medications   Medication Sig Dispense Refill     atenolol-chlorthalidone (TENORETIC) 100-25 mg per tablet Take 1 tablet by mouth daily. 90 tablet 0     citalopram (CELEXA) 40 MG tablet Take 1 tablet (40 mg total) by mouth daily. 90 tablet 0     fexofenadine (ALLEGRA) 180 MG tablet Take 180 mg by mouth daily.       fluticasone (FLONASE) 50 mcg/actuation nasal spray 1 spray into each nostril daily.       vitamin E 400 UNIT capsule Take 400 Units by mouth daily.       albuterol (PROVENTIL HFA) 90 mcg/actuation inhaler Inhale 2 puffs every 6 (six) hours as needed for wheezing. 1 Inhaler 0     hydrocortisone (ANUSOL-HC) 2.5 % rectal cream Apply rectally 2 times daily 30 g 1     polyethylene glycol (GLYCOLAX) 17 gram/dose powder Take 17 g by mouth daily. 850 g 0     potassium chloride (KLOR-CON) 10 MEQ CR tablet Take 1 tablet (10 mEq total) by mouth daily. 90 tablet 0     predniSONE (DELTASONE) 20 MG tablet TK 2 TS PO QD WITH A MEAL FOR 5 DAYS  0     R2 - LIDO/NIFED 1.5/0.3% Apply 1 gram 2-3 times daily. 90 g 0     ranitidine (ZANTAC) 150 MG tablet Take 1 tablet (150 mg total) by mouth 2 (two) times a day. 10 tablet 0     No current facility-administered medications for this visit.      Past Medical History:   Diagnosis Date     Anxiety      Environmental allergies      Hypertension      Past Surgical History:   Procedure Laterality Date     ANKLE FRACTURE SURGERY       BREAST SURGERY        SECTION, CLASSIC       HYSTERECTOMY       Social History     Socioeconomic History     Marital status:      Spouse name: None      Number of children: None     Years of education: None     Highest education level: None   Social Needs     Financial resource strain: None     Food insecurity - worry: None     Food insecurity - inability: None     Transportation needs - medical: None     Transportation needs - non-medical: None   Occupational History     None   Tobacco Use     Smoking status: Never Smoker     Smokeless tobacco: Never Used   Substance and Sexual Activity     Alcohol use: Yes     Alcohol/week: 0.6 oz     Types: 1 Glasses of wine per week     Comment: Yearly     Drug use: No     Sexual activity: None   Other Topics Concern     None   Social History Narrative     None     Family History   Problem Relation Age of Onset     Dementia Mother      Breast cancer Mother      No Medical Problems Sister      No Medical Problems Brother      Cancer Father         Lung     OBJECTIVE:    Vitals:    11/27/18 1100 11/27/18 1132   BP: 140/88 142/86   Patient Site: Left Arm    Patient Position: Sitting    Cuff Size: Adult Regular    Pulse: 68    Weight: 188 lb 5 oz (85.4 kg)      Body mass index is 29.94 kg/m .    Physical Exam:  Constitutional: Patient was oriented to person, place, and time. Patient appeared well-developed and well-nourished. No distress.   Head: Normocephalic and atraumatic.   Right Ear: External ear normal. Normal TM  Left Ear: External ear normal. Normal TM  Nose: Nose normal.   Mouth/Throat: Oropharynx was clear and moist. No oropharyngeal exudate.   Eyes: Conjunctivae and EOM were normal. Pupils were equal, round, and reactive to light. Right eye exhibited no discharge. Left eye exhibited no discharge. No scleral icterus.   Neck: Neck supple. No JVD present. No tracheal deviation present. No thyromegaly present.   Lymphadenopathy:  Patient has no cervical adenopathy.   Cardiovascular: Normal rate, regular rhythm, normal heart sounds and intact distal pulses. No murmur heard.   Pulmonary/Chest: Effort normal and breath sounds  normal. No stridor. No respiratory distress. Patient had no wheezes, no rales, exhibits no tenderness.   Abdominal: Soft. Bowel sounds were normal. Patient exhibited no distension and no mass. There was no tenderness. There was no rebound and no guarding.   Neurological: Patient was alert and oriented to person, place, and time. Patient had normal reflexes. No cranial nerve deficit. Coordination normal.   Skin: Skin was warm and dry. No rash noted. Patient was not diaphoretic. No erythema. No pallor.   Musculoskeletal: Rolls from sitting to standing position with support of chair. Walks with a little bit of antalgic gait due to pain. She has difficulty walking on her toes and heels. She has good flexion and extension of her back. Tenderness to palpation of thoracic and lumbar spine.    Results for orders placed or performed in visit on 02/13/17   Hemoglobin   Result Value Ref Range    Hemoglobin 12.9 12.0 - 16.0 g/dL   Potassium   Result Value Ref Range    Potassium 4.0 3.5 - 5.0 mmol/L     ADDITIONAL HISTORY SUMMARIZED (2): None.   DECISION TO OBTAIN EXTRA INFORMATION (1): None.   RADIOLOGY TESTS (1): None.  LABS (1): Labs ordered today.  MEDICINE TESTS (1): None.  INDEPENDENT REVIEW (2 each): None.     Total data points = 1    Total time was 25 minutes, greater than 50% counseling and coordinating care regarding the above issues.    By signing my name below, I, Susan Fernandez, attest that this documentation has been prepared under the direction and in the presence of Dr. Carmen Melvin.  Electronic Signature: Tarsha Kendrick. 11/27/2018 11:10.    I, Dr. Shayne Rod MD , personally performed the services described in this documentation. All medical record entries made by the scribe were at my direction and in my presence. I have reviewed the chart and discharge instructions (if applicable) and agree that the record reflects my personal performance and is accurate and complete.

## 2021-06-22 NOTE — PROGRESS NOTES
[facility] Daily Progress     Patient Name: Vanessa Franco  Date: 12/31/2018  Visit #: 7  Referral Diagnosis: Chronic midline back pain, unspecified back location  Referring provider: Shayne Stuart*  Visit Diagnosis:     ICD-10-CM    1. Chronic bilateral low back pain without sciatica M54.5     G89.29    2. Chronic hip pain, bilateral M25.551     M25.552     G89.29    3. Bilateral chronic knee pain M25.561     M25.562     G89.29        Precautions / Restrictions : anxiety, hypertension       Assessment:     Response to Intervention:  Very good.  Improving posture and decreasing pain.    Symptoms are consistent with:  Lumbar facet arthropathy with stenosis complicated by limited hip extension AROM.   Patient is appropriate to continue with skilled physical therapy intervention, as indicated by initial plan of care.    Goal Status:  Pt. will demonstrate/verbalize independence in self-management of condition in : 6 weeks  Pt. will be independent with home exercise program in : 6 weeks  Pt. will have improved quality of sleep: with less pain;waking less times/night;in 6 weeks  Pt. will show improved balance for safer : ambulation;standing;in 6 weeks  Pt. will improve posture : and demonstrate posture with minimal to no cuing;in 6 weeks  Pt. will be able to walk : 20 minutes;on even surfaces;with less pain;with less difficulty;for community mobility;for exercise/recreation;in 6 weeks  Patient will stand : 30 minutes;with less pain;with less difficultty;for home chores;for work;in 6 weeks    No Data Recorded  Other functional progress:    Patient is able to stand more erect.      Plan / Patient Education:     Continue with initial plan of care.  Progress with home program as tolerated.       Subjective:     Pain Rating:  Resting 0  Activity:  0    Response to last treatment: good.  HEP- Frequency: 2-3x/day, Questions or difficulties:  none.    Patient reports:    Feeling great. 90% overall  "improvement since starting physical therapy.    Objective:       Near normal gait pattern.      Treatment Today   Manual therapy  Manual therapy:  Bilateral LE longitudinal lumbar mobilization.     Rate/grade Target  Direction  Relative movement Location in range Patient position   2,3 Hands around tibia and fibula of lower legs, superior to ankles Inferior force  Inferior distraction of Lumbar facet joints 20-30 degrees of hip flexion. Supine         Manual therapy:  Neck     MFR layers 1-3 bilateral:  Suboccipitals, cervical extensors, cervical paraspinal rotators, scalenes.     Manual therapy:  Cervical longitudinal mobilization     Rate/grade Target  Direction  Relative movement Location in range Patient position   2,3 Cervical vertebrae Superior Distraction of facet joints Head in neutral Supine        Exercises below represent all exercise the patient has done in the clinic and HEP.  Please see today's exercise flow-sheet for specifics of today's exercise performance.   Exercises:  Exercise #1: SKTC  Comment #1: 30\"x 2   Exercise #2: DKTC  Comment #2: 30\" x 2   Exercise #3: SLR nerve glide  Comment #3: 10 reps bilateral  Exercise #4: LTR  Comment #4: 10            TREATMENT MINUTES COMMENTS   Evaluation     Self-care/ Home management     Manual therapy 25 See above.   Neuromuscular Re-education     Therapeutic Activity     Therapeutic Exercises     Gait training     Modality__________________                Total 25    Blank areas are intentional and mean the treatment did not include these items.       Feliciano Bernardo, PT  12/31/2018     "

## 2021-06-22 NOTE — PROGRESS NOTES
[facility] Daily Progress     Patient Name: Vanessa Franco  Date: 12/17/2018  Visit #: 4  Referral Diagnosis: Chronic midline back pain, unspecified back location  Referring provider: Shayne Stuart*  Visit Diagnosis:     ICD-10-CM    1. Chronic bilateral low back pain without sciatica M54.5     G89.29    2. Chronic hip pain, bilateral M25.551     M25.552     G89.29    3. Bilateral chronic knee pain M25.561     M25.562     G89.29        Precautions / Restrictions : anxiety, hypertension       Assessment:     Response to Intervention:  Very good.  Improving posture and decreasing pain.    Symptoms are consistent with:  Lumbar facet arthropathy with stenosis complicated by limited hip extension AROM.   Patient is appropriate to continue with skilled physical therapy intervention, as indicated by initial plan of care.    Goal Status:  Pt. will demonstrate/verbalize independence in self-management of condition in : 6 weeks  Pt. will be independent with home exercise program in : 6 weeks  Pt. will have improved quality of sleep: with less pain;waking less times/night;in 6 weeks  Pt. will show improved balance for safer : ambulation;standing;in 6 weeks  Pt. will improve posture : and demonstrate posture with minimal to no cuing;in 6 weeks  Pt. will be able to walk : 20 minutes;on even surfaces;with less pain;with less difficulty;for community mobility;for exercise/recreation;in 6 weeks  Patient will stand : 30 minutes;with less pain;with less difficultty;for home chores;for work;in 6 weeks    No Data Recorded  Other functional progress:    Patient is able to stand more erect.      Plan / Patient Education:     Continue with initial plan of care.  Progress with home program as tolerated.       Subjective:     Pain Rating:  Resting 0  Activity:  3    Response to last treatment: good.  HEP- Frequency: 2-3x/day, Questions or difficulties:  none.    Patient reports:    Less pain and continued  "improvement.    Objective:       Again Notable improvement of flexed posture since last visit.    Near normal gait pattern.      Treatment Today   Manual therapy  Manual therapy:  Bilateral LE longitudinal lumbar mobilization.     Rate/grade Target  Direction  Relative movement Location in range Patient position   2 Hands around tibia and fibula of lower legs, superior to ankles Inferior force  Inferior distraction of Lumbar facet joints 20-30 degrees of hip flexion. Supine         Manual therapy:  Neck     MFR layers 1-3 bilateral:  Suboccipitals, cervical extensors, cervical paraspinal rotators, scalenes.     Manual therapy:  Cervical longitudinal mobilization     Rate/grade Target  Direction  Relative movement Location in range Patient position   2 Cervical vertebrae Superior Distraction of facet joints Head in neutral Supine        Exercises below represent all exercise the patient has done in the clinic and HEP.  Please see today's exercise flow-sheet for specifics of today's exercise performance.   Exercises:  Exercise #1: SKTC  Comment #1: 30\"x 2   Exercise #2: DKTC  Comment #2: 30\" x 2   Exercise #3: SLR nerve glide  Comment #3: 10 reps bilateral  Exercise #4: LTR  Comment #4: 10            TREATMENT MINUTES COMMENTS   Evaluation     Self-care/ Home management     Manual therapy 25 See above.   Neuromuscular Re-education     Therapeutic Activity     Therapeutic Exercises 5 Level 2 band ankle DF and PF x 10 for leg strengthening.   Gait training     Modality__________________                Total 30    Blank areas are intentional and mean the treatment did not include these items.       Feliciano Bernardo, PT  12/17/2018     "

## 2021-06-22 NOTE — PROGRESS NOTES
[facility] Certification Request    January 4, 2019      Patient: Vanessa Franco  MR Number: [id number]  YOB: 1960  Date of Visit: 1/2/2019      Dear Shayne Valerio*:    Thank you for this referral.   We are seeing Vanessa Franco in Physical Therapy for  Chronic midline back pain, unspecified back location.    Medicare and/or Medicaid requires physician review and approval of the treatment plan. Please review the plan of care and verify that you agree with the therapy plan of care by co-signing this note.      Plan of Care  Authorization / Certification Start Date: 01/01/19  Authorization / Certification End Date: 03/03/19  Authorization / Certification Number of Visits: 8  Communication with: Referral Source  Patient Related Instruction: Nature of Condition;Basis of treatment;Treatment plan and rationale;Body mechanics;Expected outcome;Posture;Self Care instruction;Precautions;Next steps  Times per Week: 2-1  Number of Weeks: 6  Number of Visits: 8  Discharge Planning: to include self care and HEP  Precautions / Restrictions : anxiety, hypertension  Therapeutic Exercise: ROM;Stretching;Strengthening  Neuromuscular Reeducation: posture;balance/proprioception;core  Manual Therapy: soft tissue mobilization;myofascial release;joint mobilization;muscle energy  Modalities: electrical stimulation;TENS;traction;cold pack;hot pack (trials as needed.)      Goals:  Pt. will demonstrate/verbalize independence in self-management of condition in : 6 weeks  Pt. will be independent with home exercise program in : 6 weeks  Pt. will have improved quality of sleep: with less pain;waking less times/night;in 6 weeks  Pt. will show improved balance for safer : ambulation;standing;in 6 weeks  Pt. will improve posture : and demonstrate posture with minimal to no cuing;in 6 weeks  Pt. will be able to walk : 20 minutes;on even surfaces;with less pain;with less difficulty;for community  mobility;for exercise/recreation;in 6 weeks  Patient will stand : 30 minutes;with less pain;with less difficultty;for home chores;for work;in 6 weeks    No Data Recorded      If you have any questions or concerns, please don't hesitate to call.    Sincerely,      Feliciano Bernardo, PT      Physician:    For Medicare/MA patients:      Physician recommendation:     ___ Follow therapist's recommendation        ___ Modify therapy      *Physician co-signature indicates they certify the need for these services furnished within this plan and while under their care.         [facility] Daily Progress     Patient Name: Vanessa Franco  Date: 1/2/2019  Visit #: 8  Referral Diagnosis: Chronic midline back pain, unspecified back location  Referring provider: Shayne Stuart*  Visit Diagnosis:     ICD-10-CM    1. Chronic bilateral low back pain without sciatica M54.5     G89.29    2. Chronic hip pain, bilateral M25.551     M25.552     G89.29    3. Bilateral chronic knee pain M25.561     M25.562     G89.29        Precautions / Restrictions : anxiety, hypertension       Assessment:     Response to Intervention:  Very good.  Improving posture and decreasing pain.    Symptoms are consistent with:  Lumbar facet arthropathy with stenosis complicated by limited hip extension AROM.   Patient is appropriate to continue with skilled physical therapy intervention, as indicated by initial plan of care.    Goal Status:  Pt. will demonstrate/verbalize independence in self-management of condition in : 6 weeks  Pt. will be independent with home exercise program in : 6 weeks  Pt. will have improved quality of sleep: with less pain;waking less times/night;in 6 weeks  Pt. will show improved balance for safer : ambulation;standing;in 6 weeks  Pt. will improve posture : and demonstrate posture with minimal to no cuing;in 6 weeks  Pt. will be able to walk : 20 minutes;on even surfaces;with less pain;with less difficulty;for  "community mobility;for exercise/recreation;in 6 weeks  Patient will stand : 30 minutes;with less pain;with less difficultty;for home chores;for work;in 6 weeks    No Data Recorded  Other functional progress:    Patient is able to stand more erect.      Plan / Patient Education:     Continue with initial plan of care.  Progress with home program as tolerated.       Subjective:     Pain Rating:  Resting 0  Activity:  0    Response to last treatment: good.  HEP- Frequency: 2-3x/day, Questions or difficulties:  none.    Patient reports:    She continues to feel good.  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    Objective:       Near normal gait pattern.      Treatment Today   Manual therapy  Manual therapy:  Bilateral LE longitudinal lumbar mobilization.     Rate/grade Target  Direction  Relative movement Location in range Patient position   2,3 Hands around tibia and fibula of lower legs, superior to ankles Inferior force  Inferior distraction of Lumbar facet joints 20-30 degrees of hip flexion. Supine         Manual therapy:  Neck     MFR layers 1-3 bilateral:  Suboccipitals, cervical extensors, cervical paraspinal rotators, scalenes.     Manual therapy:  Cervical longitudinal mobilization     Rate/grade Target  Direction  Relative movement Location in range Patient position   2,3 Cervical vertebrae Superior Distraction of facet joints Head in neutral Supine        Exercises below represent all exercise the patient has done in the clinic and HEP.  Please see today's exercise flow-sheet for specifics of today's exercise performance.   Exercises:  Exercise #1: SKTC  Comment #1: 30\"x 2   Exercise #2: DKTC  Comment #2: 30\" x 2   Exercise #3: SLR nerve glide  Comment #3: 10 reps bilateral  Exercise #4: LTR  Comment #4: 10            TREATMENT MINUTES COMMENTS   Evaluation     Self-care/ Home management     Manual therapy 25 See above.   Neuromuscular " Re-education     Therapeutic Activity     Therapeutic Exercises     Gait training     Modality__________________                Total 25    Blank areas are intentional and mean the treatment did not include these items.       Feliciano Bernardo, PT  1/2/2019

## 2021-06-22 NOTE — PROGRESS NOTES
[facility] Daily Progress     Patient Name: Vanessa Franco  Date: 12/27/2018  Visit #: 6  Referral Diagnosis: Chronic midline back pain, unspecified back location  Referring provider: Shayne Stuart*  Visit Diagnosis:     ICD-10-CM    1. Chronic bilateral low back pain without sciatica M54.5     G89.29    2. Chronic hip pain, bilateral M25.551     M25.552     G89.29    3. Bilateral chronic knee pain M25.561     M25.562     G89.29        Precautions / Restrictions : anxiety, hypertension       Assessment:     Response to Intervention:  Very good.  Improving posture and decreasing pain.    Symptoms are consistent with:  Lumbar facet arthropathy with stenosis complicated by limited hip extension AROM.   Patient is appropriate to continue with skilled physical therapy intervention, as indicated by initial plan of care.    Goal Status:  Pt. will demonstrate/verbalize independence in self-management of condition in : 6 weeks  Pt. will be independent with home exercise program in : 6 weeks  Pt. will have improved quality of sleep: with less pain;waking less times/night;in 6 weeks  Pt. will show improved balance for safer : ambulation;standing;in 6 weeks  Pt. will improve posture : and demonstrate posture with minimal to no cuing;in 6 weeks  Pt. will be able to walk : 20 minutes;on even surfaces;with less pain;with less difficulty;for community mobility;for exercise/recreation;in 6 weeks  Patient will stand : 30 minutes;with less pain;with less difficultty;for home chores;for work;in 6 weeks    No Data Recorded  Other functional progress:    Patient is able to stand more erect.      Plan / Patient Education:     Continue with initial plan of care.  Progress with home program as tolerated.       Subjective:     Pain Rating:  Resting 0  Activity:  0    Response to last treatment: good.  HEP- Frequency: 2-3x/day, Questions or difficulties:  none.    Patient reports:    Continues to improve.  She was  "able to exercise at the gym for 30 minutes without increased pain.    Objective:       Again Notable improvement of flexed posture since last visit.    Near normal gait pattern.      Treatment Today   Manual therapy  Manual therapy:  Bilateral LE longitudinal lumbar mobilization.     Rate/grade Target  Direction  Relative movement Location in range Patient position   2,3 Hands around tibia and fibula of lower legs, superior to ankles Inferior force  Inferior distraction of Lumbar facet joints 20-30 degrees of hip flexion. Supine         Manual therapy:  Neck     MFR layers 1-3 bilateral:  Suboccipitals, cervical extensors, cervical paraspinal rotators, scalenes.     Manual therapy:  Cervical longitudinal mobilization     Rate/grade Target  Direction  Relative movement Location in range Patient position   2,3 Cervical vertebrae Superior Distraction of facet joints Head in neutral Supine        Exercises below represent all exercise the patient has done in the clinic and HEP.  Please see today's exercise flow-sheet for specifics of today's exercise performance.   Exercises:  Exercise #1: SKTC  Comment #1: 30\"x 2   Exercise #2: DKTC  Comment #2: 30\" x 2   Exercise #3: SLR nerve glide  Comment #3: 10 reps bilateral  Exercise #4: LTR  Comment #4: 10            TREATMENT MINUTES COMMENTS   Evaluation     Self-care/ Home management     Manual therapy 30 See above.   Neuromuscular Re-education     Therapeutic Activity     Therapeutic Exercises     Gait training     Modality__________________                Total 30    Blank areas are intentional and mean the treatment did not include these items.       Feliciano Bernardo, PT  12/27/2018     "

## 2021-06-22 NOTE — PROGRESS NOTES
[facility] Daily Progress     Patient Name: Vanessa Franco  Date: 12/10/2018  Visit #: 2  Referral Diagnosis: Chronic midline back pain, unspecified back location  Referring provider: Shayne Stuart*  Visit Diagnosis:     ICD-10-CM    1. Chronic bilateral low back pain without sciatica M54.5     G89.29    2. Chronic hip pain, bilateral M25.551     M25.552     G89.29    3. Bilateral chronic knee pain M25.561     M25.562     G89.29        Precautions / Restrictions : anxiety, hypertension       Assessment:     Response to Intervention:  Very good.  Improving posture and decreasing pain.    Symptoms are consistent with:  Lumbar facet arthropathy with stenosis complicated by limited hip extension AROM.   Patient is appropriate to continue with skilled physical therapy intervention, as indicated by initial plan of care.    Goal Status:  Pt. will demonstrate/verbalize independence in self-management of condition in : 6 weeks  Pt. will be independent with home exercise program in : 6 weeks  Pt. will have improved quality of sleep: with less pain;waking less times/night;in 6 weeks  Pt. will show improved balance for safer : ambulation;standing;in 6 weeks  Pt. will improve posture : and demonstrate posture with minimal to no cuing;in 6 weeks  Pt. will be able to walk : 20 minutes;on even surfaces;with less pain;with less difficulty;for community mobility;for exercise/recreation;in 6 weeks  Patient will stand : 30 minutes;with less pain;with less difficultty;for home chores;for work;in 6 weeks    No Data Recorded  Other functional progress:    Patient is able to stand more erect.      Plan / Patient Education:     Continue with initial plan of care.  Progress with home program as tolerated.       Subjective:     Pain Rating:  Resting 8  Activity:  9    Response to last treatment: good.  HEP- Frequency: 2-3x/day, Questions or difficulties:  none.    Patient reports:      She is standing taller and  "having a little less pain.      Objective:       Notable improvement of flexed posture since last visit.      Treatment Today   Manual therapy  Manual therapy:  Bilateral LE longitudinal lumbar mobilization.     Rate/grade Target  Direction  Relative movement Location in range Patient position   2 Hands around tibia and fibula of lower legs, superior to ankles Inferior force  Inferior distraction of Lumbar facet joints 20-30 degrees of hip flexion. Supine         Manual therapy:  Neck     MFR layers 1-3 bilateral:  Suboccipitals, cervical extensors, cervical paraspinal rotators, scalenes.     Manual therapy:  Cervical longitudinal mobilization     Rate/grade Target  Direction  Relative movement Location in range Patient position   2 Cervical vertebrae Superior Distraction of facet joints Head in neutral Supine          Exercises:  Exercise #1: SKTC  Comment #1: 30\"x 2   Exercise #2: DKTC  Comment #2: 30\" x 2   Exercise #3: SLR nerve glide  Comment #3: 10 reps bilateral  Exercise #4: LTR  Comment #4: 10            TREATMENT MINUTES COMMENTS   Evaluation     Self-care/ Home management     Manual therapy 15 See above.   Neuromuscular Re-education     Therapeutic Activity     Therapeutic Exercises 15 See exercise flow-sheet for details.    Gait training     Modality__________________                Total 30    Blank areas are intentional and mean the treatment did not include these items.       Feliciano Bernardo, PT  12/10/2018     "

## 2021-06-22 NOTE — PROGRESS NOTES
[facility] Daily Progress     Patient Name: Vanessa Franco  Date: 12/12/2018  Visit #: 3  Referral Diagnosis: Chronic midline back pain, unspecified back location  Referring provider: Shayne Stuart*  Visit Diagnosis:     ICD-10-CM    1. Chronic bilateral low back pain without sciatica M54.5     G89.29    2. Chronic hip pain, bilateral M25.551     M25.552     G89.29    3. Bilateral chronic knee pain M25.561     M25.562     G89.29        Precautions / Restrictions : anxiety, hypertension       Assessment:     Response to Intervention:  Very good.  Improving posture and decreasing pain.    Symptoms are consistent with:  Lumbar facet arthropathy with stenosis complicated by limited hip extension AROM.   Patient is appropriate to continue with skilled physical therapy intervention, as indicated by initial plan of care.    Goal Status:  Pt. will demonstrate/verbalize independence in self-management of condition in : 6 weeks  Pt. will be independent with home exercise program in : 6 weeks  Pt. will have improved quality of sleep: with less pain;waking less times/night;in 6 weeks  Pt. will show improved balance for safer : ambulation;standing;in 6 weeks  Pt. will improve posture : and demonstrate posture with minimal to no cuing;in 6 weeks  Pt. will be able to walk : 20 minutes;on even surfaces;with less pain;with less difficulty;for community mobility;for exercise/recreation;in 6 weeks  Patient will stand : 30 minutes;with less pain;with less difficultty;for home chores;for work;in 6 weeks    No Data Recorded  Other functional progress:    Patient is able to stand more erect.      Plan / Patient Education:     Continue with initial plan of care.  Progress with home program as tolerated.       Subjective:     Pain Rating:  Resting 0  Activity:  3    Response to last treatment: good.  HEP- Frequency: 2-3x/day, Questions or difficulties:  none.    Patient reports:    Sleeping a lot better.    Less  "pain.  50-60% overall improvement since starting PT.    Objective:       Notable improvement of flexed posture since last visit.    Less shuffling gait.      Treatment Today   Manual therapy  Manual therapy:  Bilateral LE longitudinal lumbar mobilization.     Rate/grade Target  Direction  Relative movement Location in range Patient position   2 Hands around tibia and fibula of lower legs, superior to ankles Inferior force  Inferior distraction of Lumbar facet joints 20-30 degrees of hip flexion. Supine         Manual therapy:  Neck     MFR layers 1-3 bilateral:  Suboccipitals, cervical extensors, cervical paraspinal rotators, scalenes.     Manual therapy:  Cervical longitudinal mobilization     Rate/grade Target  Direction  Relative movement Location in range Patient position   2 Cervical vertebrae Superior Distraction of facet joints Head in neutral Supine        Exercises below represent all exercise the patient has done in the clinic and HEP.  Please see today's exercise flow-sheet for specifics of today's exercise performance.   Exercises:  Exercise #1: SKTC  Comment #1: 30\"x 2   Exercise #2: DKTC  Comment #2: 30\" x 2   Exercise #3: SLR nerve glide  Comment #3: 10 reps bilateral  Exercise #4: LTR  Comment #4: 10            TREATMENT MINUTES COMMENTS   Evaluation     Self-care/ Home management     Manual therapy 25 See above.   Neuromuscular Re-education     Therapeutic Activity     Therapeutic Exercises     Gait training     Modality__________________                Total 25    Blank areas are intentional and mean the treatment did not include these items.       Feliciano Bernardo, PT  12/12/2018     "

## 2021-06-22 NOTE — PROGRESS NOTES
"        [facility] Daily Progress     Patient Name: Vanessa Franco  Date: 1/7/2019  Visit #: 9/16  Referral Diagnosis: Chronic midline back pain, unspecified back location  Referring provider: Shayne Stuart*  Visit Diagnosis:     ICD-10-CM    1. Chronic bilateral low back pain without sciatica M54.5     G89.29    2. Chronic hip pain, bilateral M25.551     M25.552     G89.29    3. Bilateral chronic knee pain M25.561     M25.562     G89.29        Precautions / Restrictions : anxiety, hypertension       Assessment:     Response to Intervention:  Very good.  Decreased pain noted post treatment.  \"I feel like a new person\".    Symptoms are consistent with:  Lumbar facet arthropathy with stenosis complicated by limited hip extension AROM.   Patient is appropriate to continue with skilled physical therapy intervention, as indicated by initial plan of care.    Goal Status:  Pt. will demonstrate/verbalize independence in self-management of condition in : 6 weeks  Pt. will be independent with home exercise program in : 6 weeks  Pt. will have improved quality of sleep: with less pain;waking less times/night;in 6 weeks  Pt. will show improved balance for safer : ambulation;standing;in 6 weeks  Pt. will improve posture : and demonstrate posture with minimal to no cuing;in 6 weeks  Pt. will be able to walk : 20 minutes;on even surfaces;with less pain;with less difficulty;for community mobility;for exercise/recreation;in 6 weeks  Patient will stand : 30 minutes;with less pain;with less difficultty;for home chores;for work;in 6 weeks    No Data Recorded  Other functional progress:    Patient is able to stand more erect.      Plan / Patient Education:     Continue with initial plan of care.  Progress with home program as tolerated.       Subjective:     Pain Rating:  Resting 0  Activity:  3    Response to last treatment: good.  HEP- Frequency: 2-3x/day, Questions or difficulties:  none.    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 2 year olds is not an option.  Instead, she has been changing them on the floor, while she stands and bends over at the waist.    That posture has been causing some increased back pain and stiffness 3/10.  Patient education provided for better posture and body mechanics by using a gardening pad under her knee in a half kneel.        Objective:       Slower slightly antalgic gait pattern.      Treatment Today   Manual therapy  Manual therapy:  Bilateral LE longitudinal lumbar mobilization.     Rate/grade Target  Direction  Relative movement Location in range Patient position   2,3 Hands around tibia and fibula of lower legs, superior to ankles Inferior force  Inferior distraction of Lumbar facet joints 20-30 degrees of hip flexion. Supine         Manual therapy:  Neck     MFR layers 1-3 bilateral:  Suboccipitals, cervical extensors, cervical paraspinal rotators, scalenes.     Manual therapy:  Cervical longitudinal mobilization     Rate/grade Target  Direction  Relative movement Location in range Patient position   2,3 Cervical vertebrae Superior Distraction of facet joints Head in neutral Supine        Exercises below represent all exercise the patient has done in the clinic and HEP.  Please see today's exercise flow-sheet for specifics of today's exercise performance.   Exercises:  Exercise #1: SKTC  Comment #1: 30\"x 2   Exercise #2: DKTC  Comment #2: 30\" x 2   Exercise #3: SLR nerve glide  Comment #3: 10 reps bilateral  Exercise #4: LTR  Comment #4: 10       Manual piriformis stretch 30\" x 2      TREATMENT MINUTES COMMENTS   Evaluation     Self-care/ Home management     Manual therapy 30 See above.   Neuromuscular Re-education     Therapeutic Activity     Therapeutic Exercises     Gait training     Modality__________________                Total 30    Blank areas are " intentional and mean the treatment did not include these items.       Feilciano Bernardo, PT  1/7/2019

## 2021-06-22 NOTE — PROGRESS NOTES
[facility] Daily Progress     Patient Name: Vanessa Franco  Date: 12/24/2018  Visit #: 5  Referral Diagnosis: Chronic midline back pain, unspecified back location  Referring provider: Shayne Stuart*  Visit Diagnosis:     ICD-10-CM    1. Chronic bilateral low back pain without sciatica M54.5     G89.29    2. Chronic hip pain, bilateral M25.551     M25.552     G89.29    3. Bilateral chronic knee pain M25.561     M25.562     G89.29        Precautions / Restrictions : anxiety, hypertension       Assessment:     Response to Intervention:  Very good.  Improving posture and decreasing pain.    Symptoms are consistent with:  Lumbar facet arthropathy with stenosis complicated by limited hip extension AROM.   Patient is appropriate to continue with skilled physical therapy intervention, as indicated by initial plan of care.    Goal Status:  Pt. will demonstrate/verbalize independence in self-management of condition in : 6 weeks  Pt. will be independent with home exercise program in : 6 weeks  Pt. will have improved quality of sleep: with less pain;waking less times/night;in 6 weeks  Pt. will show improved balance for safer : ambulation;standing;in 6 weeks  Pt. will improve posture : and demonstrate posture with minimal to no cuing;in 6 weeks  Pt. will be able to walk : 20 minutes;on even surfaces;with less pain;with less difficulty;for community mobility;for exercise/recreation;in 6 weeks  Patient will stand : 30 minutes;with less pain;with less difficultty;for home chores;for work;in 6 weeks    No Data Recorded  Other functional progress:    Patient is able to stand more erect.      Plan / Patient Education:     Continue with initial plan of care.  Progress with home program as tolerated.       Subjective:     Pain Rating:  Resting 0  Activity:  0    Response to last treatment: good.  HEP- Frequency: 2-3x/day, Questions or difficulties:  none.    Patient reports:    No pain for the last 4  "days.    Objective:       Again Notable improvement of flexed posture since last visit.    Near normal gait pattern.      Treatment Today   Manual therapy  Manual therapy:  Bilateral LE longitudinal lumbar mobilization.     Rate/grade Target  Direction  Relative movement Location in range Patient position   2 Hands around tibia and fibula of lower legs, superior to ankles Inferior force  Inferior distraction of Lumbar facet joints 20-30 degrees of hip flexion. Supine         Manual therapy:  Neck     MFR layers 1-3 bilateral:  Suboccipitals, cervical extensors, cervical paraspinal rotators, scalenes.     Manual therapy:  Cervical longitudinal mobilization     Rate/grade Target  Direction  Relative movement Location in range Patient position   2 Cervical vertebrae Superior Distraction of facet joints Head in neutral Supine        Exercises below represent all exercise the patient has done in the clinic and HEP.  Please see today's exercise flow-sheet for specifics of today's exercise performance.   Exercises:  Exercise #1: SKTC  Comment #1: 30\"x 2   Exercise #2: DKTC  Comment #2: 30\" x 2   Exercise #3: SLR nerve glide  Comment #3: 10 reps bilateral  Exercise #4: LTR  Comment #4: 10            TREATMENT MINUTES COMMENTS   Evaluation     Self-care/ Home management     Manual therapy 30 See above.   Neuromuscular Re-education     Therapeutic Activity     Therapeutic Exercises     Gait training     Modality__________________                Total 30    Blank areas are intentional and mean the treatment did not include these items.       Feliciano Bernardo, PT  12/24/2018     "

## 2021-06-22 NOTE — PROGRESS NOTES
[facility]   [place]-Pelvic Initial Evaluation    Patient Name: Vanessa Franco  Date of evaluation: 12/6/2018  Referral Diagnosis: Chronic midline back pain, unspecified back location  Referring provider: Shayne Stuart*  Visit Diagnosis:     ICD-10-CM    1. Chronic bilateral low back pain without sciatica M54.5     G89.29    2. Chronic hip pain, bilateral M25.551     M25.552     G89.29    3. Bilateral chronic knee pain M25.561     M25.562     G89.29        Precautions / Restrictions : anxiety, hypertension       Assessment:      Impairments in  pain, posture, ROM, joint mobility, strength, gait/locomotion and balance  Patient's signs and symptoms are consistent with lumbar facet arthropathy with stenosis.  Patient responded well to longitudinal distraction of lumbar and cervical spine.  Prognosis to achieve goals is  fair   Pt. is appropriate for skilled PT intervention as outlined in the Plan of Care (POC).    Goals:  Pt. will demonstrate/verbalize independence in self-management of condition in : 6 weeks  Pt. will be independent with home exercise program in : 6 weeks  Pt. will have improved quality of sleep: with less pain;waking less times/night;in 6 weeks  Pt. will show improved balance for safer : ambulation;standing;in 6 weeks  Pt. will improve posture : and demonstrate posture with minimal to no cuing;in 6 weeks  Pt. will be able to walk : 20 minutes;on even surfaces;with less pain;with less difficulty;for community mobility;for exercise/recreation;in 6 weeks  Patient will stand : 30 minutes;with less pain;with less difficultty;for home chores;for work;in 6 weeks    No Data Recorded    Barriers to Learning or Achieving Goals:  No Barriers.    Patient's expectations/goals are realistic.    A shared decision making model was used.  The patient's values and choices were respected.  The following represents what was discussed and decided upon by the physical therapist and the patient.           Plan / Patient Instructions:        Plan of Care:   Communication with: Referral Source  Patient Related Instruction: Nature of Condition;Basis of treatment;Treatment plan and rationale;Body mechanics;Expected outcome;Posture;Self Care instruction;Precautions;Next steps  Times per Week: 2  Number of Weeks: 6  Number of Visits: 12  Discharge Planning: to include self care and HEP  Precautions / Restrictions : anxiety, hypertension  Therapeutic Exercise: ROM;Stretching;Strengthening  Neuromuscular Reeducation: posture;balance/proprioception;core  Manual Therapy: soft tissue mobilization;myofascial release;joint mobilization;muscle energy  Modalities: electrical stimulation;TENS;traction;cold pack;hot pack (trials as needed.)      POC and pathology of condition were reviewed with patient.  Pt. is in agreement with the Plan of Care  A Home Exercise Program (HEP) was initiated today.  Pt. was instructed in exercises by PT and patient was given a handout with detailed instructions.    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 Date of onset:  January 2016, order date 11/2018. Onset was gradual. Symptoms are constant. She denies history of similar symptoms. She describes their previous level of function as not limited    Pain Rating:10  Pain rating at best: 9  Pain rating at worst: 10  Pain description: aching, pain, sharp and soreness    Functional limitations are described as occurring with:   ascending and descending stairs or curbs  balance  bending  dependent care  lifting  looking up and turning head  sitting    sleeping  sports or recreation    standing    transitional movements sit to  stand, sit to supine and rolling in bed  walking             Objective:      Note: Items left blank indicates the item was not performed or not indicated at the time of the evaluation.    Patient Outcome Measures :    Modified Oswestry Low Back Pain Disablity Questionnaire  in %: 68     Scores range from 0-100%, where a score of 0% represents minimal pain and maximal function. The minimal clinically important difference is a score reduction of 12%.    Examination  1. Chronic bilateral low back pain without sciatica     2. Chronic hip pain, bilateral     3. Bilateral chronic knee pain       Precautions / Restrictions : anxiety, hypertension     Involved side: Bilateral  Posture Observation:      Cervical:  Moderate forward head  Shoulder/Thoracic complex: Moderate bilateral scapular protraction   Moderately increased upper thoracic kyphosis  Lumbopelvic complex: severely flexed posture at pelvis    Lumbar ROM:    Date: 12/06/18      *Indicate scale AROM AROM AROM   Lumbar Flexion Mod loss     Lumbar Extension Severe loss      Right Left Right Left Right Left   Lumbar Sidebending severe severe       Lumbar Rotation severe severe       Thoracic Flexion      Thoracic Extension      Thoracic Sidebending         Thoracic Rotation           Lower Extremity Strength:    Date: 12/06/18      LE strength/5 Right Left Right Left Right Left   Hip Flexion (L1-3) 4 4       Hip Extension (L5-S1) 4 4       Hip Abduction (L4-5) 4 4       Hip Adduction (L2-3) 4 4       Hip External Rotation         Hip Internal Rotation         Knee Extension (L3-4) 4+ 5       Knee Flexion 4 4       Ankle Dorsiflexion (L4-5) 5 5       Great Toe Extension (L5)         Ankle Plantar flexion (S1)         Abdominals fair       Sensation            Reflex Testing:    Lumbar Dermatomes Right Left UE Reflexes Right Left   Iliac Crest  (L1) WNL WNL Biceps (C5-6)     Anterior Medial Thigh (L2) WNL WNL Brachioradialis (C5-6)     Anterior Thigh, Medial  "Epicondyle Femur (L3) WNL WNL Triceps (C7-8)     Lateral Thigh, Anterior Knee, Medial Leg/Malleolus (L4) WNL WNL Moira s test     Lateral Leg, Dorsal Foot (L5) WNL WNL LE Reflexes     Lateral Foot (S1) WNL WNL Patellar (L3-4) 3+ 3+   Posterior Leg (S2) WNL WNL Achilles (S1-2) 1+ 1+   Other:   Babinski Response       Palpation & Flexibility:  Severe limitation of Hip flexors, tender glut max, lumbar paraspinals    Lumbar Special Tests:      Lumbar Special Tests Right Left SI Tests Right  Left   Quadrant test   SI Compression     Straight leg raise - - SI Distraction     Crossover response - - POSH Test - -   Slump   Sacral Thrust     Sit-up test  FADIR     Trunk extensor endurance test  Resisted Abduction     Prone instability test  Other:     Pubic shotgun  Other:       Repeated Motion Testing:  Does not centralize  Does not peripheralize    Passive Mobility - Joint Integrity:  Not indicated  secondary to pain    LE Screen:  Hip PROM:  Severe loss of hip extension, moderate IR and ER.  Hip Scour:  neg.    Treatment Today     Exercises:  Exercise #1: SKTC  Comment #1: 30\"x 2   Exercise #2: DKTC  Comment #2: 30\" x 2   Exercise #3: SLR nerve glide  Comment #3: 10 reps bilateral  Exercise #4: LTR  Comment #4: 10         Manual therapy  Manual therapy:  Bilateral LE longitudinal lumbar mobilization.    Rate/grade Target  Direction  Relative movement Location in range Patient position   2 Hands around tibia and fibula of lower legs, superior to ankles Inferior force  Inferior distraction of Lumbar facet joints 20-30 degrees of hip flexion. Supine         Manual therapy:  Neck    MFR layers 1-3 bilateral:  Suboccipitals, cervical extensors, cervical paraspinal rotators, scalenes.    Manual therapy:  Cervical longitudinal mobilization    Rate/grade Target  Direction  Relative movement Location in range Patient position   2 Cervical vertebrae Superior Distraction of facet joints Head in neutral Supine          TREATMENT " MINUTES COMMENTS   Evaluation 25    Self-care/ Home management     Manual therapy 20    Neuromuscular Re-education     Therapeutic Activity     Therapeutic Exercises 10    Gait training     Modality__________________                Total 55    Blank areas are intentional and mean the treatment did not include these items.     PT Evaluation Code: (Please list factors)  Patient History/Comorbidities: There is no problem list on file for this patient.     Past Medical History:   Diagnosis Date     Anxiety      Environmental allergies      Hypertension       Examination: as above  Clinical Presentation: stable  Clinical Decision Making: low complexity    Patient History/  Comorbidities Examination  (body structures and functions, activity limitations, and/or participation restrictions) Clinical Presentation Clinical Decision Making (Complexity)   No documented Comorbidities or personal factors 1-2 Elements Stable and/or uncomplicated Low   1-2 documented comorbidities or personal factor 3 Elements Evolving clinical presentation with changing characteristics Moderate   3-4 documented comorbidities or personal factors 4 or more Unstable and unpredictable High Feliciano Bernardo, PT  12/6/2018  8:21 AM

## 2021-06-22 NOTE — PROGRESS NOTES
"        [facility] Daily Progress     Patient Name: Vanessa Franco  Date: 1/9/2019  Visit #: 10/16  Referral Diagnosis: Chronic midline back pain, unspecified back location  Referring provider: Shayne Stuart*  Visit Diagnosis:     ICD-10-CM    1. Chronic bilateral low back pain without sciatica M54.5     G89.29    2. Chronic hip pain, bilateral M25.551     M25.552     G89.29    3. Bilateral chronic knee pain M25.561     M25.562     G89.29        Precautions / Restrictions : anxiety, hypertension       Assessment:     Response to Intervention:  Very good.  Decreased pain noted post treatment.  \"I feel like a new person\".    Symptoms are consistent with:  Lumbar facet arthropathy with stenosis complicated by limited hip extension AROM.   Patient is appropriate to continue with skilled physical therapy intervention, as indicated by initial plan of care.    Goal Status:  Pt. will demonstrate/verbalize independence in self-management of condition in : 6 weeks  Pt. will be independent with home exercise program in : 6 weeks  Pt. will have improved quality of sleep: with less pain;waking less times/night;in 6 weeks  Pt. will show improved balance for safer : ambulation;standing;in 6 weeks  Pt. will improve posture : and demonstrate posture with minimal to no cuing;in 6 weeks  Pt. will be able to walk : 20 minutes;on even surfaces;with less pain;with less difficulty;for community mobility;for exercise/recreation;in 6 weeks  Patient will stand : 30 minutes;with less pain;with less difficultty;for home chores;for work;in 6 weeks    No Data Recorded  Other functional progress:    Patient is able to stand more erect.      Plan / Patient Education:     Continue with initial plan of care.  Progress with home program as tolerated.       Subjective:     Pain Rating:  Resting 0  Activity:  3    Response to last treatment: good.  HEP- Frequency: 2-3x/day, Questions or difficulties:  none.    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  In the meantime, her daughter is doing more of that.            Objective:       No limping today      Treatment Today   Manual therapy  Manual therapy:  Bilateral LE longitudinal lumbar mobilization.     Rate/grade Target  Direction  Relative movement Location in range Patient position   2,3 Hands around tibia and fibula of lower legs, superior to ankles Inferior force  Inferior distraction of Lumbar facet joints 20-30 degrees of hip flexion. Supine         Manual therapy:  Neck     MFR layers 1-3 bilateral:  Suboccipitals, cervical extensors, cervical paraspinal rotators, scalenes.     Manual therapy:  Cervical longitudinal mobilization     Rate/grade Target  Direction  Relative movement Location in range Patient position   2,3 Cervical vertebrae Superior Distraction of facet joints Head in neutral Supine        Exercises below represent all exercise the patient has done in the clinic and HEP.  Please see today's exercise flow-sheet for specifics of today's exercise performance.   Exercises:  Exercise #1: TA contraction in hooklying  Comment #1: hold for 10 seconds, 10 reps, 2-3x/day  Exercise #2: Bridging with TA contraction   Comment #2: 10 reps, 2 sets, 1x/day  Exercise #3: SLR nerve glide  Comment #3: 10 reps bilateral  Exercise #4: LTR  Comment #4: 10            TREATMENT MINUTES COMMENTS   Evaluation     Self-care/ Home management     Manual therapy 15 See above.   Neuromuscular Re-education     Therapeutic Activity     Therapeutic Exercises 15    Gait training     Modality__________________                Total 30    Blank areas are intentional and mean the treatment did not include these items.       Feliciano Bernardo, PT  1/9/2019

## 2021-06-23 NOTE — PROGRESS NOTES
[facility] Discharge Summary  Patient Name: Vanessa Franco  Date: 7/24/2019  Referral Diagnosis:    Referring provider: Shyane Stuart*  Visit Diagnosis:   1. Chronic bilateral low back pain without sciatica     2. Chronic hip pain, bilateral     3. Bilateral chronic knee pain         Goals:  No data recorded  No data recorded    Patient was seen for 11 visits physical therapy.    The patient attended therapy initially, but did not finish the therapy sessions prescribed.  Goals were not fully achieved. Explanation for goals not achieved: The patient discontinued therapy, did not return.    Therapy will be discontinued at this time.  Please see progress note dated 1/23/19 for patient status.      Thank you for your referral.  Feliciano Bernardo, PT  7/24/2019  2:10 PM         [facility] Daily Progress     Patient Name: Vaenssa Franco  Date: 1/23/2019  Visit #: 11/16  Referral Diagnosis: Chronic midline back pain, unspecified back location  Referring provider: Shayne Stuart*  Visit Diagnosis:     ICD-10-CM    1. Chronic bilateral low back pain without sciatica M54.5     G89.29    2. Chronic hip pain, bilateral M25.551     M25.552     G89.29    3. Bilateral chronic knee pain M25.561     M25.562     G89.29        Precautions / Restrictions : anxiety, hypertension       Assessment:     Response to Intervention:  Very good.  Decreased pain noted post treatment.     Symptoms are consistent with:  Lumbar facet arthropathy with stenosis complicated by limited hip extension AROM.   Patient is appropriate to continue with skilled physical therapy intervention, as indicated by initial plan of care.    Goal Status:  Pt. will demonstrate/verbalize independence in self-management of condition in : 6 weeks  Pt. will be independent with home exercise program in : 6 weeks  Pt. will have improved quality of sleep: with less pain;waking less times/night;in 6 weeks  Pt. will show  improved balance for safer : ambulation;standing;in 6 weeks  Pt. will improve posture : and demonstrate posture with minimal to no cuing;in 6 weeks  Pt. will be able to walk : 20 minutes;on even surfaces;with less pain;with less difficulty;for community mobility;for exercise/recreation;in 6 weeks  Patient will stand : 30 minutes;with less pain;with less difficultty;for home chores;for work;in 6 weeks    No Data Recorded  Other functional progress:    Patient is able to stand more erect.    Patient is able to tolerate treadmill exercise      Plan / Patient Education:     Continue with initial plan of care.  Progress with home program as tolerated.  Plan to reassess core exercises, and discharge following next visit.     Subjective:     Pain Rating:  Resting 0  Activity:  2    Response to last treatment: good.  HEP- Frequency: 2-3x/day, Questions or difficulties:  none.    Patient reports:       She has not found a pad to use alternate technique for changing toddlers diapers at work yet, but indicates that she will.  In the meantime, her daughter is doing more of that.    Back has not been bothering her as much.    Added the treadmill, up to 30 minutes at a time without any complaints of pain        Objective:       No limping today      Treatment Today   Manual therapy     Manual therapy:  Neck     MFR layers 1-3 bilateral:  Suboccipitals, cervical extensors, cervical paraspinal rotators, scalenes.     Manual therapy:  Cervical longitudinal mobilization     Rate/grade Target  Direction  Relative movement Location in range Patient position   2,3 Cervical vertebrae Superior Distraction of facet joints Head in neutral Supine        Exercises below represent all exercise the patient has done in the clinic and HEP.  Please see today's exercise flow-sheet for specifics of today's exercise performance.   Exercises:  Exercise #1: TA contraction in hooklying  Comment #1: hold for 10 seconds, 10 reps, 2-3x/day  Exercise #2: TA  activation with bridging and alternating leg lifts   Comment #2: 10 reps x 3 sets   Exercise #3: SLR nerve glide  Comment #3: 10 reps bilateral  Exercise #4: LTR  Comment #4: 10            TREATMENT MINUTES COMMENTS   Evaluation     Self-care/ Home management     Manual therapy 20 See above.   Neuromuscular Re-education     Therapeutic Activity     Therapeutic Exercises 10    Gait training     Modality__________________                Total 30    Blank areas are intentional and mean the treatment did not include these items.     I attest that I was present in the room, making skilled assessments and directing the service provided today.  I was responsible for the assessment and treatment of the patient.  During this time, I was not engaged in treating another patient or doing other tasks.  Feliciano Bernardo, PT       Colt Victoria, SPT      Feliciano Bernardo, PT  1/23/2019

## 2021-06-23 NOTE — TELEPHONE ENCOUNTER
Refill Approved    Rx renewed per Medication Renewal Policy. Medication was last renewed on 4/5/18.    Ov: 11/27/18    Emily Solano, [facility] Triage/Med Refill 1/10/2019     Requested Prescriptions   Pending Prescriptions Disp Refills     citalopram (CELEXA) 40 MG tablet [Pharmacy Med Name: CITALOPRAM 40MG     TAB] 90 tablet 0     Sig: TAKE 1 TABLET BY MOUTH ONCE DAILY    [facility] Refill Protocol  Passed - 1/8/2019  7:03 PM       Passed - PCP or prescribing provider visit in last year    Last office visit with prescriber/PCP: 11/27/2018 Shayne Stuart MD OR same dept: 11/27/2018 Shayne Stuart MD OR same specialty: 11/27/2018 Shayne Stuart MD  Last physical: 2/13/2017 Last MTM visit: Visit date not found   Next visit within 3 mo: Visit date not found  Next physical within 3 mo: Visit date not found  Prescriber OR PCP: Shayne Rod MD  Last diagnosis associated with med order: 1. Anxiety  - citalopram (CELEXA) 40 MG tablet [Pharmacy Med Name: CITALOPRAM 40MG     TAB]; TAKE 1 TABLET BY MOUTH ONCE DAILY  Dispense: 90 tablet; Refill: 0    2. Depression  - citalopram (CELEXA) 40 MG tablet [Pharmacy Med Name: CITALOPRAM 40MG     TAB]; TAKE 1 TABLET BY MOUTH ONCE DAILY  Dispense: 90 tablet; Refill: 0    If protocol passes may refill for 12 months if within 3 months of last provider visit (or a total of 15 months).

## 2021-06-24 NOTE — TELEPHONE ENCOUNTER
Refill Approved    Rx renewed per Medication Renewal Policy. Medication was last renewed on 4/5/18.    Nimisha Huynh, [facility] Triage/Med Refill 2/22/2019     Requested Prescriptions   Pending Prescriptions Disp Refills     atenolol-chlorthalidone (TENORETIC) 100-25 mg per tablet [Pharmacy Med Name: ATENOL/[id number]] 90 tablet 0     Sig: TAKE 1 TABLET BY MOUTH ONCE DAILY    Diuretics/Combination Diuretics Refill Protocol  Passed - 2/21/2019 12:31 PM       Passed - Visit with PCP or prescribing provider visit in past 12 months    Last office visit with prescriber/PCP: 11/27/2018 Shayne Stuart MD OR same dept: 11/27/2018 Shayne Stuart MD OR same specialty: 11/27/2018 Shayne Stuart MD  Last physical: 2/13/2017 Last MTM visit: Visit date not found   Next visit within 3 mo: Visit date not found  Next physical within 3 mo: Visit date not found  Prescriber OR PCP: Shayne Rod MD  Last diagnosis associated with med order: 1. Hypertension, essential, benign  - atenolol-chlorthalidone (TENORETIC) 100-25 mg per tablet [Pharmacy Med Name: ATENOL/[id number]]; TAKE 1 TABLET BY MOUTH ONCE DAILY  Dispense: 90 tablet; Refill: 0    If protocol passes may refill for 12 months if within 3 months of last provider visit (or a total of 15 months).            Passed - Serum Potassium in past 12 months     Lab Results   Component Value Date    Potassium 3.5 11/27/2018            Passed - Serum Sodium in past 12 months     Lab Results   Component Value Date    Sodium 141 11/27/2018            Passed - Blood pressure on file in past 12 months    BP Readings from Last 1 Encounters:   11/27/18 142/86            Passed - Serum Creatinine in past 12 months     Creatinine   Date Value Ref Range Status   11/27/2018 0.68 0.60 - 1.10 mg/dL Final

## 2021-06-25 NOTE — PROGRESS NOTES
Assessment:   1. Acute non-recurrent frontal sinusitis  - azithromycin (ZITHROMAX Z-LEXY) 250 MG tablet; Take 2 tablets (500 mg) on  Day 1,  followed by 1 tablet (250 mg) once daily on Days 2 through 5.  Dispense: 6 tablet; Refill: 0     Plan:   Nasal saline sprays.  Nasal steroids per medication orders.  Antihistamines per medication orders.  Zithromax per medication orders.  Follow up in 10 days or as needed.     Subjective:   Vanessa Franco is a 58 y.o. female who presents for evaluation of sinus pain. Symptoms include: clear rhinorrhea, cough, facial pain, headaches, post nasal drip, sinus pressure and sore throat. Onset of symptoms was 2 weeks ago. Symptoms have been gradually worsening since that time. Past history is significant for no history of pneumonia or bronchitis. Patient is a non-smoker.    The following portions of the patient's history were reviewed and updated as appropriate: allergies, current medications, past family history, past medical history, past social history, past surgical history and problem list.    Review of Systems  Pertinent items are noted in HPI.     Objective:   /70   Pulse 66   Temp 99.2  F (37.3  C) (Oral)   Wt 187 lb 5 oz (85 kg)   LMP 12/14/1989   SpO2 97%   BMI 29.78 kg/m    General appearance: alert, appears stated age and cooperative  Head: Normocephalic, without obvious abnormality, atraumatic, sinuses tender to percussion  Eyes: conjunctivae/corneas clear. PERRL, EOM's intact. Fundi benign.  Ears: normal TM's and external ear canals both ears  Nose: Nares normal. Septum midline. Mucosa normal. No drainage or sinus tenderness.  Throat: lips, mucosa, and tongue normal; teeth and gums normal  Neck: no adenopathy, no carotid bruit, no JVD, supple, symmetrical, trachea midline and thyroid not enlarged, symmetric, no tenderness/mass/nodules  Lungs: clear to auscultation bilaterally  Heart: regular rate and rhythm, S1, S2 normal, no murmur, click, rub or  gallop  Pulses: 2+ and symmetric  Skin: Skin color, texture, turgor normal. No rashes or lesions  Lymph nodes: Cervical, supraclavicular, and axillary nodes normal.  Neurologic: Grossly normal

## 2021-06-26 ENCOUNTER — HEALTH MAINTENANCE LETTER (OUTPATIENT)
Age: 61
End: 2021-06-26

## 2021-07-27 ENCOUNTER — OFFICE VISIT (OUTPATIENT)
Dept: FAMILY MEDICINE | Facility: CLINIC | Age: 61
End: 2021-07-27
Payer: COMMERCIAL

## 2021-07-27 VITALS
HEIGHT: 68 IN | DIASTOLIC BLOOD PRESSURE: 74 MMHG | HEART RATE: 70 BPM | WEIGHT: 201.2 LBS | BODY MASS INDEX: 30.49 KG/M2 | SYSTOLIC BLOOD PRESSURE: 118 MMHG

## 2021-07-27 DIAGNOSIS — G47.01 INSOMNIA DUE TO MEDICAL CONDITION: Primary | ICD-10-CM

## 2021-07-27 DIAGNOSIS — F32.0 MILD MAJOR DEPRESSION (H): ICD-10-CM

## 2021-07-27 PROCEDURE — 99214 OFFICE O/P EST MOD 30 MIN: CPT | Performed by: FAMILY MEDICINE

## 2021-07-27 RX ORDER — TRAZODONE HYDROCHLORIDE 50 MG/1
50 TABLET, FILM COATED ORAL AT BEDTIME
Qty: 30 TABLET | Refills: 1 | Status: SHIPPED | OUTPATIENT
Start: 2021-07-27 | End: 2021-10-12

## 2021-07-27 ASSESSMENT — ANXIETY QUESTIONNAIRES
1. FEELING NERVOUS, ANXIOUS, OR ON EDGE: MORE THAN HALF THE DAYS
5. BEING SO RESTLESS THAT IT IS HARD TO SIT STILL: NEARLY EVERY DAY
7. FEELING AFRAID AS IF SOMETHING AWFUL MIGHT HAPPEN: MORE THAN HALF THE DAYS
IF YOU CHECKED OFF ANY PROBLEMS ON THIS QUESTIONNAIRE, HOW DIFFICULT HAVE THESE PROBLEMS MADE IT FOR YOU TO DO YOUR WORK, TAKE CARE OF THINGS AT HOME, OR GET ALONG WITH OTHER PEOPLE: VERY DIFFICULT
4. TROUBLE RELAXING: MORE THAN HALF THE DAYS
6. BECOMING EASILY ANNOYED OR IRRITABLE: MORE THAN HALF THE DAYS
GAD7 TOTAL SCORE: 15
3. WORRYING TOO MUCH ABOUT DIFFERENT THINGS: MORE THAN HALF THE DAYS
2. NOT BEING ABLE TO STOP OR CONTROL WORRYING: MORE THAN HALF THE DAYS

## 2021-07-27 ASSESSMENT — MIFFLIN-ST. JEOR: SCORE: 1522.17

## 2021-07-27 ASSESSMENT — PATIENT HEALTH QUESTIONNAIRE - PHQ9: SUM OF ALL RESPONSES TO PHQ QUESTIONS 1-9: 16

## 2021-07-27 NOTE — PROGRESS NOTES
"    Assessment & Plan     Insomnia due to medical condition  - traZODone (DESYREL) 50 MG tablet  Dispense: 30 tablet; Refill: 1    Mild major depression (H)  I discussed with her, I did offer her medication for the anxiety and depression but she noted that she is actually feeling better at this time.  And does not want to stop taking medications.  But she want to sleep and that is the reason why she is here.  We did discuss starting her on trazodone and she can start by taking 50 mg daily and then increase that to 100 depending on the effect.  I did encourage her to call and let us know if she continues to have symptoms of anxiety or depression.      BMI:   Estimated body mass index is 30.82 kg/m  as calculated from the following:    Height as of this encounter: 1.721 m (5' 7.75\").    Weight as of this encounter: 91.3 kg (201 lb 3.2 oz).       No follow-ups on file.    Judith Olivares MD  [facility]   Vanessa is a 61 year old who presents for the following health issues   HPI   She is here to discuss difficulty with sleeping that showed that has been going on for some time now.  She had a history of COVID-19 related pneumonia.  Has been having some anxiety and does have a past history of depression that was managed with citalopram.  She has also been having some depression anxiety but noted that dose has been getting better at this time.  But she does note that she finds it difficult sleeping and even if she is to sleep she wakes up in between and will be unable to sleep.  She has tried over-the-counter medications and none have been that helpful for her.      Family History   Problem Relation Age of Onset     Dementia Mother      Breast Cancer Mother      Cancer Father         Lung      Social History     Socioeconomic History     Marital status:      Spouse name: Not on file     Number of children: Not on file     Years of education: Not on file     Highest " education level: Not on file   Occupational History     Not on file   Tobacco Use     Smoking status: Never Smoker     Smokeless tobacco: Never Used   Substance and Sexual Activity     Alcohol use: Yes     Alcohol/week: 1.0 standard drinks     Comment: Alcoholic Drinks/day: Yearly     Drug use: No     Sexual activity: Not on file   Other Topics Concern     Not on file   Social History Narrative     Not on file     Social Determinants of Health     Financial Resource Strain:      Difficulty of Paying Living Expenses:    Food Insecurity:      Worried About Running Out of Food in the Last Year:      Ran Out of Food in the Last Year:    Transportation Needs:      Lack of Transportation (Medical):      Lack of Transportation (Non-Medical):    Physical Activity:      Days of Exercise per Week:      Minutes of Exercise per Session:    Stress:      Feeling of Stress :    Social Connections:      Frequency of Communication with Friends and Family:      Frequency of Social Gatherings with Friends and Family:      Attends Shinto Services:      Active Member of Clubs or Organizations:      Attends Club or Organization Meetings:      Marital Status:    Intimate Partner Violence:      Fear of Current or Ex-Partner:      Emotionally Abused:      Physically Abused:      Sexually Abused:       Past Surgical History:   Procedure Laterality Date     ANKLE FRACTURE SURGERY       BREAST SURGERY        SECTION       HYSTERECTOMY  1989     MAMMOPLASTY REDUCTION Bilateral 1993      Past Medical History:   Diagnosis Date     Anxiety      Environmental allergies      Hypertension           Review of Systems   Constitutional: Negative.    Respiratory: Negative for cough and shortness of breath.    Cardiovascular: Negative for chest pain.   Psychiatric/Behavioral: Positive for dysphoric mood and sleep disturbance. Negative for self-injury and suicidal ideas. The patient is nervous/anxious.             Objective    /74 (BP  "Location: Left arm, Patient Position: Sitting, Cuff Size: Adult Large)   Pulse 70   Ht 1.721 m (5' 7.75\")   Wt 91.3 kg (201 lb 3.2 oz)   BMI 30.82 kg/m    Body mass index is 30.82 kg/m .  Physical Exam  Constitutional:       General: She is not in acute distress.     Appearance: Normal appearance. She is obese.   Cardiovascular:      Rate and Rhythm: Normal rate.      Pulses: Normal pulses.   Pulmonary:      Effort: Pulmonary effort is normal.   Neurological:      Mental Status: She is alert.   Psychiatric:         Attention and Perception: Attention normal.         Mood and Affect: Mood and affect normal.         Speech: Speech normal.            "

## 2021-07-28 ASSESSMENT — ENCOUNTER SYMPTOMS
SLEEP DISTURBANCE: 1
SHORTNESS OF BREATH: 0
NERVOUS/ANXIOUS: 1
CONSTITUTIONAL NEGATIVE: 1
DYSPHORIC MOOD: 1
COUGH: 0

## 2021-08-17 ENCOUNTER — TELEPHONE (OUTPATIENT)
Dept: FAMILY MEDICINE | Facility: CLINIC | Age: 61
End: 2021-08-17

## 2021-08-17 NOTE — TELEPHONE ENCOUNTER
...Reason for Call:  Other prescription    Detailed comments: patient reporting she isn't sleeping well again and wanting to know if she should start taking 2x a day of the medication that was prescribed? Please advise.     Phone Number Patient can be reached at: Home number on file [phone] (home)    Best Time: ANY    Can we leave a detailed message on this number? YES    Call taken on 8/17/2021 at 1:07 PM by AYAN Downing

## 2021-08-19 NOTE — TELEPHONE ENCOUNTER
Pt called back to check on status of this message and I was able to relay Dr. Olivares's message below to have the pt take 2 tabs.     Heaven Hernandez,JUANA

## 2021-08-31 ENCOUNTER — NURSE TRIAGE (OUTPATIENT)
Dept: NURSING | Facility: CLINIC | Age: 61
End: 2021-08-31

## 2021-08-31 NOTE — TELEPHONE ENCOUNTER
Vanessa is taking Trazadone and last night woke up with racing thoughts and symptoms of depression and anxiety and anxious and is not getting better.  Patient has been  miserable since she has been on this medication.  Patient started Trazadone on 7/27/2021 for not sleeping and is getting worse since taking Trazdone.  Patient denies fever cough and shortness of breath.  Patient is requesting an in person clinic visit.      COVID 19 Nurse Triage Plan/Patient Instructions    Please be aware that novel coronavirus (COVID-19) may be circulating in the community. If you develop symptoms such as fever, cough, or SOB or if you have concerns about the presence of another infection including coronavirus (COVID-19), please contact your health care provider or visit [email or web address].     Disposition/Instructions    In-Person Visit with provider recommended. Reference Visit Selection Guide.    Thank you for taking steps to prevent the spread of this virus.  o Limit your contact with others.  o Wear a simple mask to cover your cough.  o Wash your hands well and often.    Resources    [facility]: About COVID-19: [email or web address]    CDC: What to Do If You're Sick: www.cdc.gov/coronavirus/2019-ncov/about/steps-when-sick.html    CDC: Ending Home Isolation: www.cdc.gov/coronavirus/2019-ncov/hcp/disposition-in-home-patients.html     CDC: Caring for Someone: www.cdc.gov/coronavirus/2019-ncov/if-you-are-sick/care-for-someone.html     [facility]: Interim Guidance for Hospital Discharge to Home: www.health.[facility]/diseases/coronavirus/hcp/hospdischarge.pdf    [facility] clinical trials (COVID-19 research studies): [email or web address].[email or web address].edu/[email or web address]-clinical-trials     Below are the COVID-19 hotlines at the [place] Department of Health ([facility]). Interpreters are available.   o For health questions: Call 494-296-8969 or 1-643.884.8038 (7 a.m. to 7 p.m.)  o For questions about schools and childcare: Call  135.416.5132 or 1-667.509.4173 (7 a.m. to 7 p.m.)                       Reason for Disposition    Symptoms interfere with work or school    Additional Information    Negative: Severe difficulty breathing (e.g., struggling for each breath, speaks in single words)    Negative: Bluish (or gray) lips or face    Negative: Difficult to awaken or acting confused  (e.g., disoriented, slurred speech)    Negative: Hysterical or combative behavior    Negative: Sounds like a life-threatening emergency to the triager    Negative: Alcohol or drug abuse, known or suspected, and feeling very shaky (i.e., visible tremors of hands)    Negative: Patient sounds very sick or weak to the triager    Negative: Patient sounds very upset or troubled to the triager    Protocols used: ANXIETY AND PANIC ATTACK-A-OH

## 2021-10-12 ENCOUNTER — OFFICE VISIT (OUTPATIENT)
Dept: FAMILY MEDICINE | Facility: CLINIC | Age: 61
End: 2021-10-12
Payer: COMMERCIAL

## 2021-10-12 VITALS
WEIGHT: 199.7 LBS | DIASTOLIC BLOOD PRESSURE: 100 MMHG | HEART RATE: 68 BPM | BODY MASS INDEX: 30.59 KG/M2 | SYSTOLIC BLOOD PRESSURE: 170 MMHG

## 2021-10-12 DIAGNOSIS — Z12.31 ENCOUNTER FOR SCREENING MAMMOGRAM FOR BREAST CANCER: ICD-10-CM

## 2021-10-12 DIAGNOSIS — I10 HYPERTENSION, ESSENTIAL, BENIGN: ICD-10-CM

## 2021-10-12 DIAGNOSIS — M54.50 MIDLINE LOW BACK PAIN WITHOUT SCIATICA, UNSPECIFIED CHRONICITY: Primary | ICD-10-CM

## 2021-10-12 LAB
ANION GAP SERPL CALCULATED.3IONS-SCNC: 11 MMOL/L (ref 5–18)
BUN SERPL-MCNC: 12 MG/DL (ref 8–22)
CALCIUM SERPL-MCNC: 9.6 MG/DL (ref 8.5–10.5)
CHLORIDE BLD-SCNC: 108 MMOL/L (ref 98–107)
CO2 SERPL-SCNC: 24 MMOL/L (ref 22–31)
CREAT SERPL-MCNC: 0.76 MG/DL (ref 0.6–1.1)
GFR SERPL CREATININE-BSD FRML MDRD: 85 ML/MIN/1.73M2
GLUCOSE BLD-MCNC: 83 MG/DL (ref 70–125)
POTASSIUM BLD-SCNC: 3.6 MMOL/L (ref 3.5–5)
SODIUM SERPL-SCNC: 143 MMOL/L (ref 136–145)

## 2021-10-12 PROCEDURE — 99214 OFFICE O/P EST MOD 30 MIN: CPT | Mod: 25 | Performed by: FAMILY MEDICINE

## 2021-10-12 PROCEDURE — 90471 IMMUNIZATION ADMIN: CPT | Performed by: FAMILY MEDICINE

## 2021-10-12 PROCEDURE — 80048 BASIC METABOLIC PNL TOTAL CA: CPT | Performed by: FAMILY MEDICINE

## 2021-10-12 PROCEDURE — 90686 IIV4 VACC NO PRSV 0.5 ML IM: CPT | Performed by: FAMILY MEDICINE

## 2021-10-12 PROCEDURE — 36415 COLL VENOUS BLD VENIPUNCTURE: CPT | Performed by: FAMILY MEDICINE

## 2021-10-12 RX ORDER — ATENOLOL AND CHLORTHALIDONE TABLET 100; 25 MG/1; MG/1
1 TABLET ORAL DAILY
Qty: 90 TABLET | Refills: 3 | Status: SHIPPED | OUTPATIENT
Start: 2021-10-12 | End: 2021-10-22

## 2021-10-12 ASSESSMENT — ENCOUNTER SYMPTOMS
HEADACHES: 0
BACK PAIN: 1
COUGH: 0
LIGHT-HEADEDNESS: 0
SEIZURES: 0
NECK PAIN: 0
PALPITATIONS: 0
JOINT SWELLING: 0
SHORTNESS OF BREATH: 0
CONSTITUTIONAL NEGATIVE: 1

## 2021-10-12 NOTE — PROGRESS NOTES
"    Assessment & Plan     Midline low back pain without sciatica, unspecified chronicity    Hypertension, essential, benign  - atenolol-chlorthalidone (TENORETIC) 100-25 MG tablet  Dispense: 90 tablet; Refill: 3  - Basic metabolic panel  (Ca, Cl, CO2, Creat, Gluc, K, Na, BUN)    Encounter for screening mammogram for breast cancer  - MA Screen Bilateral w/Carlos  I did write a note for her for work. I hope that they will be allowing her to use a stool to walk because of her lower back. At this point she noted that she is doing well and does not think that she needs to have evaluation. If she is a stool for work, she noted not having any pain.  Her blood pressure is high today because she did not take her medication. I will have to get well for her including the basic metabolic panel to evaluate for potassium reduction. I will also have hopefully scheduled for mammogram screening. She noted that she is otherwise doing well. Questions were answered.       BMI:   Estimated body mass index is 30.59 kg/m  as calculated from the following:    Height as of 7/27/21: 1.721 m (5' 7.75\").    Weight as of this encounter: 90.6 kg (199 lb 11.2 oz).   Weight management plan: Discussed healthy diet and exercise guidelines        No follow-ups on file.    Judith Olivares MD  [facility]   Vanessa is a 61 year old who presents for the following health issues     HPI   She comes in wanting to have a note to return for work. She is having back pain noted that when she stands and have to do her job standing it will cause a lot of pain to the back. 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 Discussed this pain to the lower back that makes it difficult for her to continue to work. Because of that she has been using a stool. But her job required that she get a note to cover her for the use of stool. She noted no numbness to the " lower back. Noted pain that is achy in nature. Has had previous neck concerns but not fully evaluated and that she is not having any major problems with it.  She takes medication for high blood pressure and noted no problems with it. She is attempting to change the timing because of that she did not take her blood pressure medication today. And noted no chest pain or shortness of breath.  She will also want to have her mammogram ordered.    Family History   Problem Relation Age of Onset     Dementia Mother      Breast Cancer Mother      Cancer Father         Lung      Social History     Socioeconomic History     Marital status:      Spouse name: Not on file     Number of children: Not on file     Years of education: Not on file     Highest education level: Not on file   Occupational History     Not on file   Tobacco Use     Smoking status: Never Smoker     Smokeless tobacco: Never Used   Substance and Sexual Activity     Alcohol use: Yes     Alcohol/week: 1.0 standard drinks     Comment: Alcoholic Drinks/day: Yearly     Drug use: No     Sexual activity: Not on file   Other Topics Concern     Not on file   Social History Narrative     Not on file     Social Determinants of Health     Financial Resource Strain:      Difficulty of Paying Living Expenses:    Food Insecurity:      Worried About Running Out of Food in the Last Year:      Ran Out of Food in the Last Year:    Transportation Needs:      Lack of Transportation (Medical):      Lack of Transportation (Non-Medical):    Physical Activity:      Days of Exercise per Week:      Minutes of Exercise per Session:    Stress:      Feeling of Stress :    Social Connections:      Frequency of Communication with Friends and Family:      Frequency of Social Gatherings with Friends and Family:      Attends Jehovah's witness Services:      Active Member of Clubs or Organizations:      Attends Club or Organization Meetings:      Marital Status:    Intimate Partner Violence:       Fear of Current or Ex-Partner:      Emotionally Abused:      Physically Abused:      Sexually Abused:       Past Surgical History:   Procedure Laterality Date     ANKLE FRACTURE SURGERY       BREAST SURGERY        SECTION       HYSTERECTOMY  1989     MAMMOPLASTY REDUCTION Bilateral 1993      Past Medical History:   Diagnosis Date     Anxiety      Environmental allergies      Hypertension         Review of Systems   Constitutional: Negative.    Respiratory: Negative for cough and shortness of breath.    Cardiovascular: Negative for chest pain and palpitations.   Musculoskeletal: Positive for back pain. Negative for gait problem, joint swelling and neck pain.   Neurological: Negative for seizures, light-headedness and headaches.            Objective    BP (!) 144/100 (BP Location: Left arm, Patient Position: Sitting, Cuff Size: Adult Large)   Pulse 68   Wt 90.6 kg (199 lb 11.2 oz)   Breastfeeding No   BMI 30.59 kg/m    Body mass index is 30.59 kg/m .  Physical Exam  Constitutional:       Appearance: Normal appearance. She is obese.   Eyes:      Pupils: Pupils are equal, round, and reactive to light.   Cardiovascular:      Rate and Rhythm: Normal rate and regular rhythm.      Pulses: Normal pulses.      Heart sounds: Normal heart sounds.   Pulmonary:      Effort: Pulmonary effort is normal.      Breath sounds: Normal breath sounds.   Musculoskeletal:         General: Normal range of motion.      Cervical back: Normal range of motion and neck supple.   Neurological:      Mental Status: She is alert.   Psychiatric:         Mood and Affect: Mood normal.

## 2021-10-12 NOTE — LETTER
October 12, 2021      Vanessa Franco  [address] [place] [address]        To Whom It May Concern:    Vanessa Franco was seen in our clinic. She has a history of lower Back Pain which is made worse by certain movements like twisting and bending etc. This is being managed.   Allowing her to sit on a stool at work will be helpful for her lower back. I will recommend that you please allow her to use a stool to sit while working.    Sincerely,        Judith Olivares MD

## 2021-10-14 ENCOUNTER — TELEPHONE (OUTPATIENT)
Dept: FAMILY MEDICINE | Facility: CLINIC | Age: 61
End: 2021-10-14

## 2021-10-14 DIAGNOSIS — I10 HYPERTENSION, ESSENTIAL, BENIGN: Primary | ICD-10-CM

## 2021-10-14 NOTE — TELEPHONE ENCOUNTER
Incoming fax from pharmacy for atenolol-chlorthalidone (TENORETIC) 100-25 MG tablet    Please initiate PA process.    Key: [id number]  Last name: Joan  : 1960    Марина Mckeon

## 2021-10-18 NOTE — TELEPHONE ENCOUNTER
Central Prior Authorization Team   Phone: [phone]    PA Initiation    Medication: Atenolol-Chlorthalidone 100-25MG tablets  Insurance Company: [employer] [place] - Phone [phone] Fax [phone]  Pharmacy Filling the Rx: [facility] [address] [facility] [address]  Filling Pharmacy Phone: [phone]  Filling Pharmacy Fax:    Start Date: 10/18/2021

## 2021-10-19 PROBLEM — F32.9 MAJOR DEPRESSION: Status: ACTIVE | Noted: 2021-02-12

## 2021-10-20 NOTE — TELEPHONE ENCOUNTER
PRIOR AUTHORIZATION DENIED    Medication: Atenolol-Chlorthalidone 100-25MG tablets    Denial Date: 10/20/2021    Denial Rational: Patient must try and fail two medications from list below:  Combination medication prescribed is not covered.  Patient would need a separate prescription for atenolol and chlorthalidone in order for it to be covered.              Appeal Information:  If provider would like to appeal please provide a letter of medical necessity and route back to PA team.

## 2021-10-22 RX ORDER — ATENOLOL 100 MG/1
100 TABLET ORAL DAILY
Qty: 90 TABLET | Refills: 1 | Status: SHIPPED | OUTPATIENT
Start: 2021-10-22 | End: 2022-09-19

## 2021-10-22 RX ORDER — CHLORTHALIDONE 25 MG/1
25 TABLET ORAL DAILY
Qty: 90 TABLET | Refills: 1 | Status: SHIPPED | OUTPATIENT
Start: 2021-10-22 | End: 2022-09-19

## 2021-10-22 NOTE — TELEPHONE ENCOUNTER
Please inform the patient that the insurance had denied the atenolol and chlorthalidone combo.  I am going to separate the two and prescribe the medications separately.  So she is going to have atenolol 100 mg daily and chlorthalidone 25 mg daily.

## 2021-12-09 ENCOUNTER — HOSPITAL ENCOUNTER (OUTPATIENT)
Dept: MAMMOGRAPHY | Facility: CLINIC | Age: 61
Discharge: HOME | End: 2021-12-09
Attending: FAMILY MEDICINE | Admitting: FAMILY MEDICINE
Payer: COMMERCIAL

## 2021-12-09 DIAGNOSIS — Z12.31 ENCOUNTER FOR SCREENING MAMMOGRAM FOR BREAST CANCER: ICD-10-CM

## 2021-12-09 PROCEDURE — 77063 BREAST TOMOSYNTHESIS BI: CPT

## 2022-02-23 ENCOUNTER — NURSE TRIAGE (OUTPATIENT)
Dept: NURSING | Facility: CLINIC | Age: 62
End: 2022-02-23
Payer: COMMERCIAL

## 2022-02-23 NOTE — TELEPHONE ENCOUNTER
Patient is calling and has a stuffy nose and headaches and coughing and chills.  Patient had covid twice and was negative. Sore throat is present and nasal dripping.  Both sides of temple and nose is feeling pressure.  Patient is coughing up green/black and red blood.  Not over one tablespoon, more blood tinged sputum.    COVID 19 Nurse Triage Plan/Patient Instructions    Please be aware that novel coronavirus (COVID-19) may be circulating in the community. If you develop symptoms such as fever, cough, or SOB or if you have concerns about the presence of another infection including coronavirus (COVID-19), please contact your health care provider or visit [email or web address].     Disposition/Instructions    In-Person Visit with provider recommended. Reference Visit Selection Guide.    Thank you for taking steps to prevent the spread of this virus.  o Limit your contact with others.  o Wear a simple mask to cover your cough.  o Wash your hands well and often.    Resources    [facility]: About COVID-19: [email or web address]    CDC: What to Do If You're Sick: www.cdc.gov/coronavirus/2019-ncov/about/steps-when-sick.html    CDC: Ending Home Isolation: www.cdc.gov/coronavirus/2019-ncov/hcp/disposition-in-home-patients.html     CDC: Caring for Someone: www.cdc.gov/coronavirus/2019-ncov/if-you-are-sick/care-for-someone.html     [facility]: Interim Guidance for Hospital Discharge to Home: www.health.[facility].mn.us/diseases/coronavirus/hcp/hospdischarge.pdf    [facility] clinical trials (COVID-19 research studies): [email or web address].[facility]-clinical-trials     Below are the COVID-19 hotlines at the [facility] of [facility] ([facility]). Interpreters are available.   o For health questions: Call 526-594-6483 or 1-262.839.8551 (7 a.m. to 7 p.m.)  o For questions about schools and childcare: Call 437-141-7739 or 1-539.443.9850 (7 a.m. to 7 p.m.)                       Reason for Disposition     Coughing up harshad-colored (reddish-brown) or blood-tinged sputum    Additional Information    Negative: Bluish (or gray) lips or face    Negative: Severe difficulty breathing (e.g., struggling for each breath, speaks in single words)    Negative: Rapid onset of cough and has hives    Negative: Coughing started suddenly after medicine, an allergic food or bee sting    Negative: Difficulty breathing after exposure to flames, smoke, or fumes    Negative: Sounds like a life-threatening emergency to the triager    Negative: Chest pain present when not coughing    Negative: Difficulty breathing    Negative: Passed out (i.e., fainted, collapsed and was not responding)    Negative: Patient sounds very sick or weak to the triager    Negative: Coughed up > 1 tablespoon (15 ml) blood (Exception: blood-tinged sputum)    Negative: Fever > 103 F (39.4 C)    Negative: Fever > 101 F (38.3 C) and over 60 years of age    Negative: Fever > 100.0 F (37.8 C) and has diabetes mellitus or a weak immune system (e.g., HIV positive, cancer chemotherapy, organ transplant, splenectomy, chronic steroids)    Negative: Fever > 100.0 F (37.8 C) and bedridden (e.g., nursing home patient, stroke, chronic illness, recovering from surgery)    Negative: Increasing ankle swelling    Negative: Wheezing is present    Negative: SEVERE coughing spells (e.g., whooping sound after coughing, vomiting after coughing)    Protocols used: COUGH-A-OH

## 2022-02-24 ENCOUNTER — OFFICE VISIT (OUTPATIENT)
Dept: FAMILY MEDICINE | Facility: CLINIC | Age: 62
End: 2022-02-24
Payer: COMMERCIAL

## 2022-02-24 VITALS
OXYGEN SATURATION: 96 % | SYSTOLIC BLOOD PRESSURE: 146 MMHG | DIASTOLIC BLOOD PRESSURE: 92 MMHG | TEMPERATURE: 100 F | HEART RATE: 89 BPM | RESPIRATION RATE: 18 BRPM

## 2022-02-24 DIAGNOSIS — J01.90 ACUTE SINUSITIS, RECURRENCE NOT SPECIFIED, UNSPECIFIED LOCATION: Primary | ICD-10-CM

## 2022-02-24 PROCEDURE — 99213 OFFICE O/P EST LOW 20 MIN: CPT | Performed by: PHYSICIAN ASSISTANT

## 2022-02-24 NOTE — PATIENT INSTRUCTIONS
Patient Education     Understanding Acute Rhinosinusitis  Acute rhinosinusitis is when the lining of the inside of the nose and the sinuses becomes irritated and swollen. It is also called sinusitis, or a sinus infection.  Sinuses are air-filled spaces in the skull behind the face. They are kept moist and clean by a lining of mucosa. Things such as pollen, smoke, and chemical fumes can irritate the mucosa. It can then swell up. As a response to irritation, the mucosa makes more mucus and other fluids. Tiny hairlike cilia cover the mucosa. Cilia help carry mucus toward the opening of the sinus. Too much mucus may cause the cilia to stop working. This blocks the sinus opening. A buildup of fluid in the sinuses then causes pain and pressure. It can also cause bacteria to grow in the sinuses.    What causes acute rhinosinusitis?  A sinus infection is most often caused by a virus. You are more likely to get one after having a cold or the flu. In some cases, a sinus infection can be caused by bacteria.  You are at higher risk for a sinus infection if you:    Are older in age    Have structural problems with your sinuses    Smoke or are exposed to secondhand smoke    Are exposed to changes in pressure, such as from flying a lot or deep sea diving    Have asthma or allergies    Have a weak immune system    Have dental disease     Symptoms of acute rhinosinusitis  Symptoms of acute rhinosinusitis often last around 7 to 10 days. If you have a bacterial infection, they may last longer. They may also get better but then worsen. You may have:    Face pain or pressure under the eyes and around the nose    Headache    Fluid draining in the back of the throat (postnasal drip)    Congestion    Drainage that is thick and colored (often green), instead of clear    Cough    Problems with your sense of smell    Ear pain or hearing problems    Fever    Tooth pain    Fatigue  Diagnosing acute rhinosinusitis  Your healthcare provider will  ask about your symptoms and past health. He or she will look at your ears, nose, throat, and sinuses. Imaging tests, such as X-rays, are often not needed.  It can be hard to figure out if a sinus infection is caused by a virus or bacterium. A bacterial infection tends to last longer. Symptoms may also get better but then worsen. Your healthcare provider may take a sample of mucus from your nose to check for bacteria.  Treating acute rhinosinusitis  Most sinus infections will go away within 10 days. Your body will fight off the virus. If your symptoms seem to get better but then worsen, you may have a bacterial infection instead. Your healthcare provider will then give you antibiotics. Take this medicine until it is gone, even if you feel better.  To help ease your symptoms, your healthcare provider may advise:    Over-the-counter pain relievers. Medicines such as acetaminophen or ibuprofen can ease sinus pain. They may also lower a fever.    Nasal washes. Washing your nasal passages with salt water may ease pain and pressure. It can rinse out mucous and other irritants from your sinuses. Your healthcare provider can show you how to do it.    Nasal steroid spray. This prescription medicine can reduce inflammation in your sinuses.    Other medicines. Decongestants, antihistamines, and other nasal sprays may give short-term relief. They may help with congestion. Talk with your healthcare provider before taking these medicines.     Preventing acute rhinosinusitis  You can help prevent a sinus infection with these steps:    Wash your hands well and often.    Stay away from people who have a cold or upper respiratory infection.    Don't smoke. And stay away from secondhand smoke.    Use a humidifier at home.    Make sure you are up-to-date on your vaccines, such as the flu shot.     When to call your healthcare provider  Call your healthcare provider right away if you have any of these:    Fever of 100.4 F (38 C) or  higher, or as directed by your healthcare provider    Pain that gets worse    Symptoms that don t get better, or get worse    New symptoms  Juliann last reviewed this educational content on 6/1/2019 2000-2021 [employer]. All rights reserved. This information is not intended as a substitute for professional medical care. Always follow your healthcare professional's instructions.

## 2022-02-24 NOTE — PROGRESS NOTES
Assessment & Plan     Acute sinusitis, recurrence not specified, unspecified location  Given Augmentin as ordered.   Push fluids, rest and ibuprofen or tylenol for comfort.    RTC for persistent or worsening sx.   Continue flonase and zyrtec.    - amoxicillin-clavulanate (AUGMENTIN) 875-125 MG tablet  Dispense: 14 tablet; Refill: 0             Return for Follow up in 1 week if not improved.    SUSANNE Cruz [facility]    Vicky Mendez is a 61 year old female who presents to clinic today for the following health issues:  Chief Complaint   Patient presents with     Sinus Problem     headache sinus congestion for abt  3 weeks     HPI    3 weeks of uri sx, got better and then worse again.    Nasal congestion, rhinorrhea, thick green discharge.    OTC medications not helpful.    Cough is a tickle in the throat due to PND.    Fever: subjective.    No sob, difficulty breathing currently but needed inhaler earlier in the week.  No chest pain.    Granddaugther ill.    Flonase daily and allergra daily for enviromental allergies.    Had a negative covid 19 test yesterday [facility].       Review of Systems  Constitutional, HEENT, cardiovascular, pulmonary, gi and gu systems are negative, except as otherwise noted.      Objective    BP (!) 146/92   Pulse 89   Temp 100  F (37.8  C) (Oral)   Resp 18   SpO2 96%   Physical Exam   Pt is in no acute distress and appears well  Ears patent B:  TM s intact, non-injected. All land marks easily visibile    Nasal mucosa is edematous, pale and boggy, purulent discharge.  TTP maxillary and frontal sinuses.    Pharynx: non erythematous, tonsils non hypertrophied, No exudate   Neck supple: no adenopathy  Lungs: CTA  Heart: RRR, no murmur, no thrills or heaves   Ext: no edema  Skin: no rashes

## 2022-03-21 ENCOUNTER — OFFICE VISIT (OUTPATIENT)
Dept: FAMILY MEDICINE | Facility: CLINIC | Age: 62
End: 2022-03-21
Payer: COMMERCIAL

## 2022-03-21 VITALS
BODY MASS INDEX: 29.15 KG/M2 | WEIGHT: 192.3 LBS | HEIGHT: 68 IN | OXYGEN SATURATION: 97 % | SYSTOLIC BLOOD PRESSURE: 100 MMHG | DIASTOLIC BLOOD PRESSURE: 60 MMHG | HEART RATE: 57 BPM

## 2022-03-21 DIAGNOSIS — R53.83 OTHER FATIGUE: ICD-10-CM

## 2022-03-21 DIAGNOSIS — R20.2 TINGLING OF BOTH UPPER EXTREMITIES: ICD-10-CM

## 2022-03-21 DIAGNOSIS — F33.1 MODERATE EPISODE OF RECURRENT MAJOR DEPRESSIVE DISORDER (H): Primary | ICD-10-CM

## 2022-03-21 PROCEDURE — 99214 OFFICE O/P EST MOD 30 MIN: CPT | Performed by: FAMILY MEDICINE

## 2022-03-21 RX ORDER — VENLAFAXINE HYDROCHLORIDE 75 MG/1
75 TABLET, EXTENDED RELEASE ORAL DAILY
Qty: 90 TABLET | Refills: 0 | Status: SHIPPED | OUTPATIENT
Start: 2022-03-21 | End: 2022-10-18

## 2022-03-21 RX ORDER — VENLAFAXINE HYDROCHLORIDE 37.5 MG/1
CAPSULE, EXTENDED RELEASE ORAL
Qty: 60 CAPSULE | Refills: 1 | Status: SHIPPED | OUTPATIENT
Start: 2022-03-21 | End: 2022-03-21

## 2022-03-21 ASSESSMENT — ANXIETY QUESTIONNAIRES
5. BEING SO RESTLESS THAT IT IS HARD TO SIT STILL: NOT AT ALL
2. NOT BEING ABLE TO STOP OR CONTROL WORRYING: NEARLY EVERY DAY
7. FEELING AFRAID AS IF SOMETHING AWFUL MIGHT HAPPEN: NEARLY EVERY DAY
GAD7 TOTAL SCORE: 15
GAD7 TOTAL SCORE: 17
1. FEELING NERVOUS, ANXIOUS, OR ON EDGE: NEARLY EVERY DAY
7. FEELING AFRAID AS IF SOMETHING AWFUL MIGHT HAPPEN: NEARLY EVERY DAY
6. BECOMING EASILY ANNOYED OR IRRITABLE: MORE THAN HALF THE DAYS
4. TROUBLE RELAXING: MORE THAN HALF THE DAYS
5. BEING SO RESTLESS THAT IT IS HARD TO SIT STILL: NOT AT ALL
GAD7 TOTAL SCORE: 15
IF YOU CHECKED OFF ANY PROBLEMS ON THIS QUESTIONNAIRE, HOW DIFFICULT HAVE THESE PROBLEMS MADE IT FOR YOU TO DO YOUR WORK, TAKE CARE OF THINGS AT HOME, OR GET ALONG WITH OTHER PEOPLE: EXTREMELY DIFFICULT
4. TROUBLE RELAXING: NEARLY EVERY DAY
1. FEELING NERVOUS, ANXIOUS, OR ON EDGE: SEVERAL DAYS
6. BECOMING EASILY ANNOYED OR IRRITABLE: NEARLY EVERY DAY
2. NOT BEING ABLE TO STOP OR CONTROL WORRYING: NEARLY EVERY DAY
7. FEELING AFRAID AS IF SOMETHING AWFUL MIGHT HAPPEN: NEARLY EVERY DAY
3. WORRYING TOO MUCH ABOUT DIFFERENT THINGS: NEARLY EVERY DAY
3. WORRYING TOO MUCH ABOUT DIFFERENT THINGS: NEARLY EVERY DAY
GAD7 TOTAL SCORE: 15

## 2022-03-21 ASSESSMENT — PATIENT HEALTH QUESTIONNAIRE - PHQ9
SUM OF ALL RESPONSES TO PHQ QUESTIONS 1-9: 13
10. IF YOU CHECKED OFF ANY PROBLEMS, HOW DIFFICULT HAVE THESE PROBLEMS MADE IT FOR YOU TO DO YOUR WORK, TAKE CARE OF THINGS AT HOME, OR GET ALONG WITH OTHER PEOPLE: VERY DIFFICULT
SUM OF ALL RESPONSES TO PHQ QUESTIONS 1-9: 13

## 2022-03-21 NOTE — PROGRESS NOTES
Assessment & Plan     Moderate episode of recurrent major depressive disorder (H)  - Adult Mental Health  Referral  - venlafaxine (EFFEXOR-ER) 75 MG 24 hr tablet  Dispense: 90 tablet; Refill: 0    Other fatigue  - Hemoglobin A1c  - UA Macro with Reflex to Micro and Culture - lab collect  - Basic metabolic panel  (Ca, Cl, CO2, Creat, Gluc, K, Na, BUN)  - TSH with free T4 reflex  - CBC with platelets  - CBC with platelets  - Hemoglobin A1c  - Basic metabolic panel  - TSH with free T4 reflex    Tingling of both upper extremities  - Hemoglobin A1c    Has taken Citalopram in the past which was stopped due to feeling better. We will start her on Venlafaxine at this time and refer her to see the Therapist. Labs were also ordered for evaluation of the increased thirst, fatigue and tingling.  Will follow up in 4 weeks for recheck.  956}  .     Judith Olivares MD  [facility]   Vanessa is a 61 year old who presents for the following health issues  accompanied by her spouse.    History of Present Illness       Mental Health Follow-up:                    Today's PHQ-9         PHQ-9 Total Score: 13  PHQ-9 Q9 Thoughts of better off dead/self-harm past 2 weeks :   (P) Not at all    How difficult have these problems made it for you to do your work, take care of things at home, or get along with other people: Very difficult    Today's ALEISHA-7 Score: 15    Reason for visit:  Depression, which she noted to have started recently in the past couple of weeks.  She has had problems with depression as well as anxiety and was on medications but had stopped the medicine about a year to 2 years ago because she was feeling better.  Cannot think of any specific cause of the depression at this time, noted increased stressors.  And noted no thoughts or plans of harming herself or any other person but is very tearful.  Also having tingling in my wrist and forearms worse at night.  Symptom  onset:  3-4 weeks ago  Symptoms include:  Tingling noted on both upper extremities, with no consistent numbness.  No weaknesses.  Denied having any neck pain.   Symptom intensity:  Severe  Symptom progression:  Worsening  Had these symptoms before:  Yes  Has tried/received treatment for these symptoms:  Yes  Previous treatment was successful:  No  What makes it worse:  Not getting sleep and stress  What makes it better:  Sometimes sleep    She eats 2-3 servings of fruits and vegetables daily.She consumes 0 sweetened beverage(s) daily.She exercises with enough effort to increase her heart rate 9 or less minutes per day.  She exercises with enough effort to increase her heart rate 3 or less days per week.   She is taking medications regularly.     Patient with past history of anxiety and Depression treated with Citalopram which was stopped about a year or 2 ago because of her feeling better.  Comes in today with concerns over feeling depressed and callahan and not really having any wants to do anything.  Having difficulty with sleeping.  She does have a lot of stressors that is causing problems but noted that once those stressors are relieved she does not really feel the same.  She still continues to have difficulties.  Also noted having tingling sensations on the hands that has been going on for about 6 weeks as well.  Noted the symptoms mostly when she is laying down.      Family History   Problem Relation Age of Onset     Dementia Mother      Breast Cancer Mother      Cancer Father         Lung      Social History     Socioeconomic History     Marital status:      Spouse name: Not on file     Number of children: Not on file     Years of education: Not on file     Highest education level: Not on file   Occupational History     Not on file   Tobacco Use     Smoking status: Never Smoker     Smokeless tobacco: Never Used   Substance and Sexual Activity     Alcohol use: Yes     Alcohol/week: 1.0 standard drink      "Comment: Alcoholic Drinks/day: Yearly     Drug use: No     Sexual activity: Not on file   Other Topics Concern     Not on file   Social History Narrative     Not on file     Social Determinants of Health     Financial Resource Strain: Not on file   Food Insecurity: Not on file   Transportation Needs: Not on file   Physical Activity: Not on file   Stress: Not on file   Social Connections: Not on file   Intimate Partner Violence: Not on file   Housing Stability: Not on file      Past Surgical History:   Procedure Laterality Date     ANKLE FRACTURE SURGERY       BREAST SURGERY        SECTION       HYSTERECTOMY  1989     MAMMOPLASTY REDUCTION Bilateral 1993      Past Medical History:   Diagnosis Date     Anxiety      Environmental allergies      Hypertension         Review of Systems   CONSTITUTIONAL: NEGATIVE for fever, chills, change in weight  ENT/MOUTH: NEGATIVE for ear, mouth and throat problems  RESP: NEGATIVE for significant cough or SOB  CV: NEGATIVE for chest pain, palpitations or peripheral edema  MUSCULOSKELETAL: NEGATIVE for significant arthralgias or myalgia  NEURO: gait disturbance and vertigo  PSYCHIATRIC: anhedonia, anxiety and fatigue but no thought of suicide  Noted increasing urination not sure if it has to do with the depression.  Also increase in thirst.  And has lost some weight.      Objective    /60 (BP Location: Left arm, Patient Position: Sitting, Cuff Size: Adult Large)   Pulse 57   Ht 1.721 m (5' 7.75\")   Wt 87.2 kg (192 lb 4.8 oz)   SpO2 97%   BMI 29.46 kg/m    Body mass index is 29.46 kg/m .  Physical Exam   GENERAL: alert, some distress mainly psychological and tearful.  NECK: no adenopathy, no asymmetry, masses, or scars and thyroid normal to palpation  RESP: lungs clear to auscultation - no rales, rhonchi or wheezes  CV: regular rate and rhythm, normal S1 S2,   ABDOMEN: soft, nontender, no hepatosplenomegaly, no masses and bowel sounds normal  MS: no gross " musculoskeletal defects noted, no edema  PSYCH: tearful, anxious and judgement and insight intact

## 2022-03-22 ENCOUNTER — TELEPHONE (OUTPATIENT)
Dept: FAMILY MEDICINE | Facility: CLINIC | Age: 62
End: 2022-03-22
Payer: COMMERCIAL

## 2022-03-22 DIAGNOSIS — F33.1 MODERATE EPISODE OF RECURRENT MAJOR DEPRESSIVE DISORDER (H): Primary | ICD-10-CM

## 2022-03-22 RX ORDER — VENLAFAXINE HYDROCHLORIDE 75 MG/1
75 CAPSULE, EXTENDED RELEASE ORAL DAILY
Qty: 90 CAPSULE | Refills: 1 | Status: SHIPPED | OUTPATIENT
Start: 2022-03-22 | End: 2022-10-18

## 2022-03-22 ASSESSMENT — ANXIETY QUESTIONNAIRES: GAD7 TOTAL SCORE: 15

## 2022-03-22 ASSESSMENT — PATIENT HEALTH QUESTIONNAIRE - PHQ9: SUM OF ALL RESPONSES TO PHQ QUESTIONS 1-9: 13

## 2022-03-22 NOTE — TELEPHONE ENCOUNTER
PRIOR AUTHORIZATION DENIED    Medication: venlafaxine (EFFEXOR-ER) 75 MG 24 hr tablet - EPA DENIED     Denial Date: 3/22/2022    Denial Rational: NEEDS TO TRY/FAIL 2 PREFERRED ALTERNATIVES:        Appeal Information:

## 2022-03-22 NOTE — TELEPHONE ENCOUNTER
Central Prior Authorization Team   Phone: [phone]      EPA denied: waiting on denial rationale letter from patient's insurance

## 2022-03-23 ENCOUNTER — NURSE TRIAGE (OUTPATIENT)
Dept: NURSING | Facility: CLINIC | Age: 62
End: 2022-03-23
Payer: COMMERCIAL

## 2022-03-23 NOTE — TELEPHONE ENCOUNTER
Please call the patient to let her to know to try to take the medication with food. If she is taking the tabs, she can cut it into 2 and take it 1 in the morning and the other in the evening for about 2-3 days then go back to taking the whole tab daily.

## 2022-03-23 NOTE — TELEPHONE ENCOUNTER
Vanessa calls and says that her Venlafaxine is giving her terrible stomach pain. Pt. Says that she just started this medication yesterday and takes it 1 time/day. Pt. Says that she wants this message left for Dr. Olivares. Pt. Says that she can be called at: [phone], if the  Has questions. RN then left this message in [facility], as requested. COVID 19 Nurse Triage Plan/Patient Instructions    Please be aware that novel coronavirus (COVID-19) may be circulating in the community. If you develop symptoms such as fever, cough, or SOB or if you have concerns about the presence of another infection including coronavirus (COVID-19), please contact your health care provider or visit [email or web address].     Disposition/Instructions    Home care recommended. Follow home care protocol based instructions.    Thank you for taking steps to prevent the spread of this virus.  o Limit your contact with others.  o Wear a simple mask to cover your cough.  o Wash your hands well and often.    Resources    [facility]: About COVID-19: [email or web address]    CDC: What to Do If You're Sick: www.cdc.gov/coronavirus/2019-ncov/about/steps-when-sick.html    CDC: Ending Home Isolation: www.cdc.gov/coronavirus/2019-ncov/hcp/disposition-in-home-patients.html     CDC: Caring for Someone: www.cdc.gov/coronavirus/2019-ncov/if-you-are-sick/care-for-someone.html     [facility]: Interim Guidance for Hospital Discharge to Home: www.health.[facility].mn.us/diseases/coronavirus/hcp/hospdischarge.pdf    [facility] clinical trials (COVID-19 research studies): [email or web address]/um-clinical-trials     Below are the COVID-19 hotlines at the [place] Department of Health ([facility]). Interpreters are available.   o For health questions: Call 676-408-9903 or 1-982.868.6283 (7 a.m. to 7 p.m.)  o For questions about schools and childcare: Call 812-133-4477 or 1-300.741.3427 (7 a.m. to 7 p.m.)                   Reason for  Disposition    [1] Follow-up call to recent contact AND [2] information only call, no triage required    Additional Information    Negative: [1] Caller is not with the adult (patient) AND [2] reporting urgent symptoms    Negative: Lab result questions    Negative: Medication questions    Negative: Caller can't be reached by phone    Negative: Caller has already spoken to PCP or another triager    Negative: RN needs further essential information from caller in order to complete triage    Negative: Requesting regular office appointment    Negative: [1] Caller requesting NON-URGENT health information AND [2] PCP's office is the best resource    Negative: Health Information question, no triage required and triager able to answer question    Negative: General information question, no triage required and triager able to answer question    Negative: Question about upcoming scheduled test, no triage required and triager able to answer question    Negative: [1] Caller is not with the adult (patient) AND [2] probable NON-URGENT symptoms    Protocols used: INFORMATION ONLY CALL - NO TRIAGE-A-

## 2022-03-23 NOTE — TELEPHONE ENCOUNTER
Please advise.    Pt said Rx are NOT tablets, they are capsules, so she can not cut in half. She wants to know what to do next.   Pt wants a call back, can leave a detailed message.

## 2022-03-23 NOTE — TELEPHONE ENCOUNTER
Call back. Per Dr Olivares keep taking with meal, and it is going to get better. Pt to call back in few days if did not get better.

## 2022-03-25 ENCOUNTER — LAB (OUTPATIENT)
Dept: LAB | Facility: CLINIC | Age: 62
End: 2022-03-25

## 2022-03-25 DIAGNOSIS — E87.5 HYPERKALEMIA: Primary | ICD-10-CM

## 2022-03-25 DIAGNOSIS — R53.83 OTHER FATIGUE: ICD-10-CM

## 2022-03-25 LAB
ALBUMIN UR-MCNC: NEGATIVE MG/DL
ANION GAP SERPL CALCULATED.3IONS-SCNC: 13 MMOL/L (ref 5–18)
APPEARANCE UR: CLEAR
BILIRUB UR QL STRIP: NEGATIVE
BUN SERPL-MCNC: 9 MG/DL (ref 8–22)
CALCIUM SERPL-MCNC: 9.8 MG/DL (ref 8.5–10.5)
CHLORIDE BLD-SCNC: 100 MMOL/L (ref 98–107)
CO2 SERPL-SCNC: 31 MMOL/L (ref 22–31)
COLOR UR AUTO: YELLOW
CREAT SERPL-MCNC: 0.8 MG/DL (ref 0.6–1.1)
ERYTHROCYTE [DISTWIDTH] IN BLOOD BY AUTOMATED COUNT: 13.6 % (ref 10–15)
GFR SERPL CREATININE-BSD FRML MDRD: 83 ML/MIN/1.73M2
GLUCOSE BLD-MCNC: 71 MG/DL (ref 70–125)
GLUCOSE UR STRIP-MCNC: NEGATIVE MG/DL
HBA1C MFR BLD: 5.4 % (ref 0–5.6)
HCT VFR BLD AUTO: 38.1 % (ref 35–47)
HGB BLD-MCNC: 12.8 G/DL (ref 11.7–15.7)
HGB UR QL STRIP: NEGATIVE
KETONES UR STRIP-MCNC: NEGATIVE MG/DL
LEUKOCYTE ESTERASE UR QL STRIP: NEGATIVE
MCH RBC QN AUTO: 30.4 PG (ref 26.5–33)
MCHC RBC AUTO-ENTMCNC: 33.6 G/DL (ref 31.5–36.5)
MCV RBC AUTO: 91 FL (ref 78–100)
NITRATE UR QL: NEGATIVE
PH UR STRIP: 7 [PH] (ref 5–8)
PLATELET # BLD AUTO: 201 10E3/UL (ref 150–450)
POTASSIUM BLD-SCNC: 3 MMOL/L (ref 3.5–5)
RBC # BLD AUTO: 4.21 10E6/UL (ref 3.8–5.2)
SODIUM SERPL-SCNC: 144 MMOL/L (ref 136–145)
SP GR UR STRIP: 1.01 (ref 1–1.03)
TSH SERPL DL<=0.005 MIU/L-ACNC: 2.22 UIU/ML (ref 0.3–5)
UROBILINOGEN UR STRIP-ACNC: 0.2 E.U./DL
WBC # BLD AUTO: 7.3 10E3/UL (ref 4–11)

## 2022-03-25 PROCEDURE — 85027 COMPLETE CBC AUTOMATED: CPT

## 2022-03-25 PROCEDURE — 81003 URINALYSIS AUTO W/O SCOPE: CPT

## 2022-03-25 PROCEDURE — 83036 HEMOGLOBIN GLYCOSYLATED A1C: CPT

## 2022-03-25 PROCEDURE — 84443 ASSAY THYROID STIM HORMONE: CPT

## 2022-03-25 PROCEDURE — 36415 COLL VENOUS BLD VENIPUNCTURE: CPT

## 2022-03-25 PROCEDURE — 80048 BASIC METABOLIC PNL TOTAL CA: CPT

## 2022-03-25 RX ORDER — POTASSIUM CHLORIDE 750 MG/1
10 TABLET, EXTENDED RELEASE ORAL DAILY
Qty: 90 TABLET | Refills: 1 | Status: SHIPPED | OUTPATIENT
Start: 2022-03-25 | End: 2022-09-19

## 2022-03-25 NOTE — TELEPHONE ENCOUNTER
PA has been completed and denied.  Patient needs to use capsules.  Provider has already sent covered medication to pharmacy.

## 2022-07-18 RX ORDER — ATENOLOL AND CHLORTHALIDONE TABLET 100; 25 MG/1; MG/1
TABLET ORAL
Qty: 90 TABLET | Refills: 0 | OUTPATIENT
Start: 2022-07-18

## 2022-07-18 NOTE — TELEPHONE ENCOUNTER
atenolol-chlorthalidone (TENORETIC) 100-25 MG tablet (Discontinued) 90 tablet 3 10/12/2021 10/22/2021 No   Sig - Route: Take 1 tablet by mouth daily - Oral   Sent to pharmacy as: Atenolol-Chlorthalidone 100-25 MG Oral Tablet (TENORETIC)   Class: E-Prescribe   Order: [id number]

## 2022-07-23 ENCOUNTER — HEALTH MAINTENANCE LETTER (OUTPATIENT)
Age: 62
End: 2022-07-23

## 2022-07-25 ENCOUNTER — TELEPHONE (OUTPATIENT)
Dept: FAMILY MEDICINE | Facility: CLINIC | Age: 62
End: 2022-07-25

## 2022-07-25 NOTE — TELEPHONE ENCOUNTER
Reason for call:  Other   Patient called regarding (reason for call): appointment  Additional comments: Pt is wondering if there is anyone at [facility] that is willing to take her as an establishing care patient. Was seen by [facility], but currently no providers taking new patients at [facility]. Pt really only wants to go to the [facility] location. Please contact patient if there is any way to get her in with a new provider.     Phone number to reach patient:  Cell number on file:    Telephone Information:   Mobile [phone]       Best Time:  Anytime    Can we leave a detailed message on this number?  YES    Travel screening: Not Applicable

## 2022-09-01 ENCOUNTER — OFFICE VISIT (OUTPATIENT)
Dept: FAMILY MEDICINE | Facility: CLINIC | Age: 62
End: 2022-09-01
Payer: COMMERCIAL

## 2022-09-01 VITALS
BODY MASS INDEX: 32.17 KG/M2 | OXYGEN SATURATION: 96 % | WEIGHT: 210 LBS | SYSTOLIC BLOOD PRESSURE: 144 MMHG | DIASTOLIC BLOOD PRESSURE: 90 MMHG | HEART RATE: 88 BPM

## 2022-09-01 DIAGNOSIS — I10 PRIMARY HYPERTENSION: ICD-10-CM

## 2022-09-01 DIAGNOSIS — K62.5 RECTAL BLEEDING: Primary | ICD-10-CM

## 2022-09-01 DIAGNOSIS — F33.0 MILD EPISODE OF RECURRENT MAJOR DEPRESSIVE DISORDER (H): ICD-10-CM

## 2022-09-01 PROCEDURE — 99214 OFFICE O/P EST MOD 30 MIN: CPT | Performed by: FAMILY MEDICINE

## 2022-09-01 RX ORDER — LISINOPRIL 10 MG/1
10 TABLET ORAL DAILY
Qty: 30 TABLET | Refills: 0 | Status: SHIPPED | OUTPATIENT
Start: 2022-09-01 | End: 2022-09-19

## 2022-09-01 NOTE — PROGRESS NOTES
Assessment & Plan     Rectal bleeding  For colonoscopy  - Adult GI  Referral - Procedure Only    Primary hypertension  Add lisinopril  Continue with chlothalidone  Stop potassium  Come back in 2 wks for BP check and potassium lab draw  - lisinopril (ZESTRIL) 10 MG tablet  Dispense: 30 tablet; Refill: 0    Mild episode of recurrent major depressive disorder (H)  Stable  Motivational interviewing was utilized today.  Modified cognitive behavioral therapy was performed with counseling on internal locus of control.  Discussed importance of self care, sleep, nutrition, hydration, exercise, and mindfulness      Shayne Rod MD  [facility]    Vicky Mendez is a 62 year old, presenting for the following health issues:  GI issue (Stomach pain when having bowel movement.) and Depression (Thinks due to med Atenolol )      History of Present Illness       Reason for visit:  I believe I have irritable bowel syndrome    She eats 2-3 servings of fruits and vegetables daily.She consumes 2 sweetened beverage(s) daily.She exercises with enough effort to increase her heart rate 20 to 29 minutes per day.  She exercises with enough effort to increase her heart rate 3 or less days per week. She is missing 3 dose(s) of medications per week.  She is not taking prescribed medications regularly due to side effects.     Rectal bleeding with rectal pain, lower abdominal pain, and lower back pain during movements on/off for several months. She has daily BMs, several BMs per day, and the above mentioned symptoms occurred on more episodes than not. Soft formed stools. No unintentional weight loss. Had colonoscopy in 2019 which showed adenomatous polyp, rectal prolapse, proctitis, and internal hemorrhoids. Reviewed of her chart showed normal CBC 5 months ago.  She has been taking aleve for the pain.    Has HTN  Takes chlorthalidone and potassium  She has not been taking atenolol for 3  weeks because it worsened her depression  BP today in clinic was elevated    Depression is better since she has been off atenolol  Not seeing a therapist at this time  Not taking effexor        Objective    BP (!) 144/90   Pulse 88   Wt 95.3 kg (210 lb)   SpO2 96%   BMI 32.17 kg/m    Body mass index is 32.17 kg/m .  Physical Exam     Constitutional: Patient is oriented to person, place, and time. Patient appears well-developed and well-nourished. No distress.   Head: Normocephalic and atraumatic.   Right Ear: External ear normal.   Left Ear: External ear normal.   Eyes: Conjunctivae and EOM are normal. Right eye exhibits no discharge. Left eye exhibits no discharge. No scleral icterus.   Neurological: Patient is alert and oriented to person, place, and time.  Skin: No rash noted. Patient is not diaphoretic. No erythema. No pallor.

## 2022-09-07 ENCOUNTER — TELEPHONE (OUTPATIENT)
Dept: GASTROENTEROLOGY | Facility: CLINIC | Age: 62
End: 2022-09-07

## 2022-09-07 ENCOUNTER — HOSPITAL ENCOUNTER (OUTPATIENT)
Facility: AMBULATORY SURGERY CENTER | Age: 62
End: 2022-09-07
Attending: INTERNAL MEDICINE
Payer: COMMERCIAL

## 2022-09-07 NOTE — TELEPHONE ENCOUNTER
Screening Questions    BlueKIND OF PREP RedLOCATION [review exclusion criteria] GreenSEDATION TYPE      1. Are you active on mychart? Yes    2. What insurance is in the chart? [employer]     3.   Ordering/Referring Provider: Ngozi Rod    4. BMI   (If greater than 40 review exclusion criteria [PAC APPT IF [MAC] @ [facility])  32.17  [If yes, BMI OVER 40-EXTENDED PREP]      **(Sedation review/consideration needed)**  Do you have a legal guardian or Medical Power of    and/or are you able to give consent for your medical care?     Yes    5. Have you had a positive covid test in the last 90 days?   N - NA    6.  Are you currently on dialysis?   N [ If yes, G-PREP & HOSPITAL setting ONLY]     7.  Do you have chronic kidney disease?  N [ If yes, G-PREP ]    8.   Do you have a diagnosis of diabetes?   N   [ If yes, G-PREP ]    9.  On a regular basis do you go 3-5 days between bowel movements?   N   [ If yes, EXTENDED PREP]    10.  Are you taking any prescription pain medications on a routine schedule?    N - NA [ If yes, EXTENDED PREP] [If yes, MAC]      11.   Do you have any chemical dependencies such as alcohol, street drugs, or methadone?    N [If yes, MAC]    12.   Do you have any history of post-traumatic stress syndrome, severe anxiety or history of psychosis?    Yes-stopped atenolol about a month ago due to it causing depression  [If yes, MAC]    13.  [FEMALES] Are you currently pregnant? NA    If yes, how many weeks?       Respiratory/Heart Screening:  [If yes to any of the following HOSPITAL setting only]     14. Do you have Pulmonary Hypertension [Lungs]?   N       15. Do you have UNCONTROLLED asthma?   N     16.  Do you use daily home oxygen?  N      17. Do you have mod to severe Obstructive Sleep Apnea?         (OKAY @  [facility]  SH  PH  RI  MG - if pt is not on OXYGEN)  N     18.   Have you had a heart or lung transplant?   N      19.   Have you had a stroke or Transient ischemic attack (TIA - aka  mini  stroke ) within 6 months?  (If yes, please review exclusion criteria)  N     20.   In the past 6 months, have you had any heart related issues including cardiomyopathy or heart attack?   N           If yes, did it require cardiac stenting or other implantable device?   N      21.   Do you have any implantable devices in your body (pacemaker, defib, LVAD)? (If yes, please review exclusion criteria)  N   22.  Do you take the medication Phentermine?  NO        23. Do you take nitroglycerin?   N           If yes, how often? NA  (if yes, HOSPITAL setting ONLY)    24.  Are you currently taking any blood thinners?    [If yes, INFORM patient to follw up w/ ORDERING PROVIDER FOR BRIDGING INSTRUCTIONS]     N    25.   Do you transfer independently?                (If NO, please HOSPITAL setting ONLY)  Yes    26.   Preferred LOCAL Pharmacy for Pre Prescription:      [facility] [address] [facility] [address]    Scheduling Details  (Please ask for phone number if not scheduled by patient)      Caller : Vanessa  Date of Procedure: 11/11/22  Surgeon: Kelsey  [place] [facility]        [place] Type: MAC l   Conscious Sedation- Needs  for 6 hours after the procedure  MAC/General-Needs  for 24 hours after procedure    NA :[Pre-op Required] at [facility]  SH  MG and OR for MAC sedation   (advise patient they will need a pre-op WITH IN 30 DAYS of procedure date)     Type of Procedure Scheduled:   Lower Endoscopy [Colonoscopy]    Which Colonoscopy Prep was Sent?:   GoLytely due to magnesium citrate recall -     KELSEY CF PATIENTS & GROEN'S PATIENTS NEEDS EXTENDED PREP       Informed patient they will need an adult  Yes  Cannot take any type of public or medical transportation alone    Pre-Procedure Covid test to be completed at [facility] or Externally: HOME  **INFORMED OF HOME TESTING & LAB OPTION**        Confirmed Nurse will call to complete assessment Yes    Additional comments:

## 2022-09-19 ENCOUNTER — OFFICE VISIT (OUTPATIENT)
Dept: FAMILY MEDICINE | Facility: CLINIC | Age: 62
End: 2022-09-19

## 2022-09-19 ENCOUNTER — ALLIED HEALTH/NURSE VISIT (OUTPATIENT)
Dept: FAMILY MEDICINE | Facility: CLINIC | Age: 62
End: 2022-09-19
Payer: COMMERCIAL

## 2022-09-19 VITALS — DIASTOLIC BLOOD PRESSURE: 96 MMHG | SYSTOLIC BLOOD PRESSURE: 154 MMHG | HEART RATE: 92 BPM

## 2022-09-19 DIAGNOSIS — I10 PRIMARY HYPERTENSION: ICD-10-CM

## 2022-09-19 DIAGNOSIS — I10 HYPERTENSION, ESSENTIAL, BENIGN: ICD-10-CM

## 2022-09-19 PROCEDURE — 99214 OFFICE O/P EST MOD 30 MIN: CPT | Performed by: FAMILY MEDICINE

## 2022-09-19 PROCEDURE — 99207 PR NO CHARGE NURSE ONLY: CPT

## 2022-09-19 RX ORDER — LISINOPRIL 20 MG/1
20 TABLET ORAL DAILY
Qty: 90 TABLET | Refills: 0 | Status: SHIPPED | OUTPATIENT
Start: 2022-09-19 | End: 2022-10-18

## 2022-09-19 RX ORDER — CHLORTHALIDONE 25 MG/1
25 TABLET ORAL DAILY
Qty: 90 TABLET | Refills: 1 | Status: CANCELLED | OUTPATIENT
Start: 2022-09-19

## 2022-09-19 NOTE — PROGRESS NOTES
Vanessa Franco is a 62 year old year old patient who comes in today for a Blood Pressure check because of new medication.  Vital Signs as repeated by HARSHAL Bermudez  Patient is taking medication as prescribed  Patient is tolerating medications well. Notes slight constipation.  Patient is monitoring Blood Pressure at home.  Average readings if yes are 150/100, 130/90  Current complaints: cough  Disposition:  huddled with provider  Provider to see pt.    Medication lost: chlorthalidone has not had since then 09/01/2022    1st: 150/102  2nd: 154/96     Lara Arauz RN\

## 2022-09-19 NOTE — PROGRESS NOTES
Assessment & Plan     Primary hypertension  Increase lisinopril to 20 mg  Daily with healthy lifestyle modifications  Follow-up in 1 month for blood pressure recheck  - lisinopril (ZESTRIL) 20 MG tablet  Dispense: 90 tablet; Refill: 0    Shayne Rod MD  [facility] OLVIN Mendez is a 62 year old presenting for the following health issues:  Hypertension (BP check)      HPI     62-year-old female here for blood pressure check.  She has been taking lisinopril 10 mg for the last couple weeks.  Blood pressure is elevated.  She has not been taking chlorthalidone or potassium.  She is tolerating lisinopril.      Objective    BP (!) 154/96   Pulse 92   There is no height or weight on file to calculate BMI.  Physical Exam     Constitutional: Patient is oriented to person, place, and time. Patient appears well-developed and well-nourished. No distress.   Head: Normocephalic and atraumatic.   Right Ear: External ear normal.   Left Ear: External ear normal.   Eyes: Conjunctivae and EOM are normal. Right eye exhibits no discharge. Left eye exhibits no discharge. No scleral icterus.   Neurological: Patient is alert and oriented to person, place, and time.  Skin: No rash noted. Patient is not diaphoretic. No erythema. No pallor.

## 2022-10-01 ENCOUNTER — HEALTH MAINTENANCE LETTER (OUTPATIENT)
Age: 62
End: 2022-10-01

## 2022-10-18 ENCOUNTER — OFFICE VISIT (OUTPATIENT)
Dept: FAMILY MEDICINE | Facility: CLINIC | Age: 62
End: 2022-10-18
Payer: COMMERCIAL

## 2022-10-18 VITALS
SYSTOLIC BLOOD PRESSURE: 136 MMHG | WEIGHT: 202 LBS | OXYGEN SATURATION: 96 % | BODY MASS INDEX: 30.94 KG/M2 | DIASTOLIC BLOOD PRESSURE: 86 MMHG | HEART RATE: 71 BPM

## 2022-10-18 DIAGNOSIS — I10 PRIMARY HYPERTENSION: Primary | ICD-10-CM

## 2022-10-18 DIAGNOSIS — Z23 HIGH PRIORITY FOR 2019-NCOV VACCINE: ICD-10-CM

## 2022-10-18 PROBLEM — J12.82 PNEUMONIA DUE TO COVID-19 VIRUS: Status: RESOLVED | Noted: 2020-11-26 | Resolved: 2022-10-18

## 2022-10-18 PROBLEM — U07.1 COVID-19: Status: RESOLVED | Noted: 2020-11-26 | Resolved: 2022-10-18

## 2022-10-18 PROBLEM — U07.1 PNEUMONIA DUE TO COVID-19 VIRUS: Status: RESOLVED | Noted: 2020-11-26 | Resolved: 2022-10-18

## 2022-10-18 LAB
ALBUMIN SERPL BCG-MCNC: 3.9 G/DL (ref 3.5–5.2)
ALP SERPL-CCNC: 55 U/L (ref 35–104)
ALT SERPL W P-5'-P-CCNC: 15 U/L (ref 10–35)
ANION GAP SERPL CALCULATED.3IONS-SCNC: 10 MMOL/L (ref 7–15)
AST SERPL W P-5'-P-CCNC: 22 U/L (ref 10–35)
BILIRUB SERPL-MCNC: 0.4 MG/DL
BUN SERPL-MCNC: 7.2 MG/DL (ref 8–23)
CALCIUM SERPL-MCNC: 9.2 MG/DL (ref 8.8–10.2)
CHLORIDE SERPL-SCNC: 106 MMOL/L (ref 98–107)
CHOLEST SERPL-MCNC: 204 MG/DL
CREAT SERPL-MCNC: 0.75 MG/DL (ref 0.51–0.95)
DEPRECATED HCO3 PLAS-SCNC: 26 MMOL/L (ref 22–29)
GFR SERPL CREATININE-BSD FRML MDRD: 90 ML/MIN/1.73M2
GLUCOSE SERPL-MCNC: 79 MG/DL (ref 70–99)
HDLC SERPL-MCNC: 46 MG/DL
LDLC SERPL CALC-MCNC: 129 MG/DL
NONHDLC SERPL-MCNC: 158 MG/DL
POTASSIUM SERPL-SCNC: 3.6 MMOL/L (ref 3.4–5.3)
PROT SERPL-MCNC: 6.5 G/DL (ref 6.4–8.3)
SODIUM SERPL-SCNC: 142 MMOL/L (ref 136–145)
TRIGL SERPL-MCNC: 144 MG/DL

## 2022-10-18 PROCEDURE — 91312 COVID-19,PF,PFIZER BOOSTER BIVALENT: CPT | Performed by: FAMILY MEDICINE

## 2022-10-18 PROCEDURE — 0124A COVID-19,PF,PFIZER BOOSTER BIVALENT: CPT | Performed by: FAMILY MEDICINE

## 2022-10-18 PROCEDURE — 90471 IMMUNIZATION ADMIN: CPT | Performed by: FAMILY MEDICINE

## 2022-10-18 PROCEDURE — 80053 COMPREHEN METABOLIC PANEL: CPT | Performed by: FAMILY MEDICINE

## 2022-10-18 PROCEDURE — 80061 LIPID PANEL: CPT | Performed by: FAMILY MEDICINE

## 2022-10-18 PROCEDURE — 99213 OFFICE O/P EST LOW 20 MIN: CPT | Mod: 25 | Performed by: FAMILY MEDICINE

## 2022-10-18 PROCEDURE — 36415 COLL VENOUS BLD VENIPUNCTURE: CPT | Performed by: FAMILY MEDICINE

## 2022-10-18 PROCEDURE — 96127 BRIEF EMOTIONAL/BEHAV ASSMT: CPT | Performed by: FAMILY MEDICINE

## 2022-10-18 PROCEDURE — 90682 RIV4 VACC RECOMBINANT DNA IM: CPT | Performed by: FAMILY MEDICINE

## 2022-10-18 RX ORDER — LISINOPRIL 20 MG/1
20 TABLET ORAL DAILY
Qty: 90 TABLET | Refills: 4 | Status: SHIPPED | OUTPATIENT
Start: 2022-10-18 | End: 2022-12-29

## 2022-10-18 ASSESSMENT — PATIENT HEALTH QUESTIONNAIRE - PHQ9
SUM OF ALL RESPONSES TO PHQ QUESTIONS 1-9: 0
10. IF YOU CHECKED OFF ANY PROBLEMS, HOW DIFFICULT HAVE THESE PROBLEMS MADE IT FOR YOU TO DO YOUR WORK, TAKE CARE OF THINGS AT HOME, OR GET ALONG WITH OTHER PEOPLE: NOT DIFFICULT AT ALL
SUM OF ALL RESPONSES TO PHQ QUESTIONS 1-9: 0

## 2022-10-18 NOTE — PROGRESS NOTES
Assessment & Plan     Primary hypertension  Doing well on lisinopril 20 mg.  She has lost about 10 pounds also which is help with her blood pressure.  She has been off of chlorthalidone and potassium.  Continue with healthy lifestyle modifications.  Motivational interviewing was utilized today.  Follow-up in 6 months  - Lipid Profile (Chol, Trig, HDL, LDL calc)  - Comprehensive metabolic panel (BMP + Alb, Alk Phos, ALT, AST, Total. Bili, TP)    High priority for 2019-nCoV vaccine  - COVID-19,PF,PFIZER BOOSTER BIVALENT 12+Yrs      Shayne Rod MD  [facility]    Vicky Mendez is a 62 year old presenting for the following health issues:  Hypertension and Imm/Inj (COVID-19 VACCINE)      History of Present Illness       Hypertension: She presents for follow up of hypertension.  She does check blood pressure  regularly outside of the clinic. Outside blood pressures have been over 140/90. She follows a low salt diet.      Today's PHQ-9         PHQ-9 Total Score: 0    PHQ-9 Q9 Thoughts of better off dead/self-harm past 2 weeks :   Not at all    How difficult have these problems made it for you to do your work, take care of things at home, or get along with other people: Not difficult at all       Hypertension Follow-up      Do you check your blood pressure regularly outside of the clinic? Yes     Are you following a low salt diet? Yes    Are your blood pressures ever more than 140 on the top number (systolic) OR more   than 90 on the bottom number (diastolic), for example 140/90? Yes      How many servings of fruits and vegetables do you eat daily?  4 or more    On average, how many sweetened beverages do you drink each day (Examples: soda, juice, sweet tea, etc.  Do NOT count diet or artificially sweetened beverages)?   1    How many days per week do you exercise enough to make your heart beat faster? 5    How many minutes a day do you exercise enough to make your  heart beat faster? 30 - 60    How many days per week do you miss taking your medication? 0    Vanessa is here for blood pressure check.  She is taking lisinopril 20 mg.  Lisinopril dose was increased from 10 to 20 mg.  She stopped the potassium supplement and inadvertently stopped chlorthalidone as well.  She has lost about 10 pounds with healthy lifestyle modifications.  Blood pressures under good control today.  Overall she feels well.      Objective    /86 (BP Location: Left arm, Patient Position: Sitting, Cuff Size: Adult Large)   Pulse 71   Wt 91.6 kg (202 lb)   SpO2 96%   BMI 30.94 kg/m    Body mass index is 30.94 kg/m .  Physical Exam     Constitutional: Patient is oriented to person, place, and time. Patient appears well-developed and well-nourished. No distress.   Head: Normocephalic and atraumatic.   Right Ear: External ear normal.   Left Ear: External ear normal.   Eyes: Conjunctivae and EOM are normal. Right eye exhibits no discharge. Left eye exhibits no discharge. No scleral icterus.   Neurological: Patient is alert and oriented to person, place, and time.  Skin: No rash noted. Patient is not diaphoretic. No erythema. No pallor.

## 2022-11-04 ENCOUNTER — TELEPHONE (OUTPATIENT)
Dept: GASTROENTEROLOGY | Facility: CLINIC | Age: 62
End: 2022-11-04

## 2022-11-04 ENCOUNTER — MYC MEDICAL ADVICE (OUTPATIENT)
Dept: CALL CENTER | Age: 62
End: 2022-11-04

## 2022-11-04 ENCOUNTER — HOSPITAL ENCOUNTER (OUTPATIENT)
Facility: AMBULATORY SURGERY CENTER | Age: 62
End: 2022-11-04
Attending: INTERNAL MEDICINE
Payer: COMMERCIAL

## 2022-11-04 DIAGNOSIS — K62.5 RECTAL BLEEDING: Primary | ICD-10-CM

## 2022-11-04 RX ORDER — BISACODYL 5 MG/1
TABLET, DELAYED RELEASE ORAL
Qty: 4 TABLET | Refills: 0 | Status: SHIPPED | OUTPATIENT
Start: 2022-11-04 | End: 2023-04-21

## 2022-11-04 NOTE — TELEPHONE ENCOUNTER
Caller: Vanessa Franco      Procedure: COLON    Date, Location, and Surgeon of Procedure Cancelled: 11/4, [facility]    Ordering Provider:RANDY    Reason for cancel (please be detailed, any staff messages or encounters to note?): PATIENT REQUEST        Rescheduled: Y     If rescheduled:    Date: 12/16   Location[place] [facility] [place]   Prep Resent: GOLYTELY(changes to prep?)   Covid Test Rescheduled:    Note any change or update to original order/sedation:

## 2022-11-04 NOTE — TELEPHONE ENCOUNTER
Attempted to contact patient regarding upcoming colonoscopy procedure on 11.11.2022 for pre assessment questions. No answer.     Left message to return call to [phone] #4    Discuss at home rapid antigen COVID test 1-2 days prior to procedure.    Arrival time: 0730    Facility location: [facility]    Sedation type: MAC    Indication for procedure: rectal bleedin    Bowel prep recommendation: Golytely d/t mg citrate recall    Prep instructions sent via [employer].  Prep prescription sent to [facility] [address] [place] [address] - [address] YAZAN Sanchez RN

## 2022-12-02 ENCOUNTER — TELEPHONE (OUTPATIENT)
Dept: GASTROENTEROLOGY | Facility: CLINIC | Age: 62
End: 2022-12-02

## 2022-12-02 RX ORDER — BISACODYL 5 MG
TABLET, DELAYED RELEASE (ENTERIC COATED) ORAL
Qty: 4 TABLET | Refills: 0 | Status: SHIPPED | OUTPATIENT
Start: 2022-12-02 | End: 2023-04-21

## 2022-12-02 NOTE — TELEPHONE ENCOUNTER
Rescheduled to 12/8/22.    Pre assessment questions completed for upcoming colonoscopy  procedure scheduled on 12/8/22    COVID policy reviewed.     Pre-op scheduled  N/A    Reviewed procedural arrival time 0600 and facility location [facility]; [address] [place] [address], [address] Floor, [place] [address]    Designated  policy reviewed. Instructed to have someone stay 6 hours post procedure.     Anticoagulation/blood thinners? No    Electronic implanted devices? No    Diabetic? No    Procedure indication: Rectal bleeding    Bowel prep recommendation: Standard Golytely d/t mag citrate recall    Reviewed procedure prep instructions.     Prep instructions resent via [employer] per pt request.  Bowel prep script sent to [facility] [address] [place] [address] - [address] [place] [address] YAZAN VERGARA S.     Patient verbalized understanding and had no questions or concerns at this time.    MARY HERNANDEZ RN

## 2022-12-02 NOTE — TELEPHONE ENCOUNTER
Caller: Vanessa Franco      Procedure: COLONOSCOPY    Date, Location, and Surgeon of Procedure [place]anc[place]16, [facility] [place]    Ordering Provider:Ngozi Rod    Reason for cancel (please be detailed, any staff messages or encounters to note?): Offering patient sooner available time.        Rescheduled: Y     If rescheduled:    Date: 12/8   [place] [facility] [place]   Prep Resent: RESENT(changes to prep?)   Covid Test Rescheduled: HOME TEST   Note any change or update to original order/sedation: MAC TO CS

## 2022-12-06 ENCOUNTER — TELEPHONE (OUTPATIENT)
Dept: GASTROENTEROLOGY | Facility: CLINIC | Age: 62
End: 2022-12-06

## 2022-12-29 ENCOUNTER — OFFICE VISIT (OUTPATIENT)
Dept: FAMILY MEDICINE | Facility: CLINIC | Age: 62
End: 2022-12-29
Payer: COMMERCIAL

## 2022-12-29 VITALS
HEIGHT: 67 IN | TEMPERATURE: 97.3 F | WEIGHT: 199 LBS | HEART RATE: 66 BPM | SYSTOLIC BLOOD PRESSURE: 162 MMHG | OXYGEN SATURATION: 96 % | BODY MASS INDEX: 31.23 KG/M2 | DIASTOLIC BLOOD PRESSURE: 108 MMHG

## 2022-12-29 DIAGNOSIS — I10 ESSENTIAL HYPERTENSION, BENIGN: Primary | ICD-10-CM

## 2022-12-29 DIAGNOSIS — E66.811 CLASS 1 OBESITY WITH SERIOUS COMORBIDITY AND BODY MASS INDEX (BMI) OF 31.0 TO 31.9 IN ADULT, UNSPECIFIED OBESITY TYPE: ICD-10-CM

## 2022-12-29 DIAGNOSIS — Z76.89 ESTABLISHING CARE WITH NEW DOCTOR, ENCOUNTER FOR: ICD-10-CM

## 2022-12-29 DIAGNOSIS — I10 PRIMARY HYPERTENSION: ICD-10-CM

## 2022-12-29 DIAGNOSIS — L50.8 CHRONIC URTICARIA: ICD-10-CM

## 2022-12-29 DIAGNOSIS — Z11.59 NEED FOR HEPATITIS C SCREENING TEST: ICD-10-CM

## 2022-12-29 DIAGNOSIS — Z11.4 SCREENING FOR HIV (HUMAN IMMUNODEFICIENCY VIRUS): ICD-10-CM

## 2022-12-29 PROBLEM — F32.9 MAJOR DEPRESSION: Status: RESOLVED | Noted: 2021-02-12 | Resolved: 2022-12-29

## 2022-12-29 LAB
HCV AB SERPL QL IA: NONREACTIVE
HIV 1+2 AB+HIV1 P24 AG SERPL QL IA: NONREACTIVE

## 2022-12-29 PROCEDURE — 99214 OFFICE O/P EST MOD 30 MIN: CPT | Mod: 25 | Performed by: NURSE PRACTITIONER

## 2022-12-29 PROCEDURE — 90715 TDAP VACCINE 7 YRS/> IM: CPT | Performed by: NURSE PRACTITIONER

## 2022-12-29 PROCEDURE — 36415 COLL VENOUS BLD VENIPUNCTURE: CPT | Performed by: NURSE PRACTITIONER

## 2022-12-29 PROCEDURE — 90471 IMMUNIZATION ADMIN: CPT | Performed by: NURSE PRACTITIONER

## 2022-12-29 PROCEDURE — 87389 HIV-1 AG W/HIV-1&-2 AB AG IA: CPT | Performed by: NURSE PRACTITIONER

## 2022-12-29 PROCEDURE — 86803 HEPATITIS C AB TEST: CPT | Performed by: NURSE PRACTITIONER

## 2022-12-29 RX ORDER — ALBUTEROL SULFATE 90 UG/1
2 AEROSOL, METERED RESPIRATORY (INHALATION) EVERY 6 HOURS PRN
COMMUNITY
End: 2023-09-22

## 2022-12-29 RX ORDER — MULTIVITAMIN WITH IRON
1 TABLET ORAL DAILY
COMMUNITY

## 2022-12-29 RX ORDER — CHLORAL HYDRATE 500 MG
2 CAPSULE ORAL DAILY
COMMUNITY

## 2022-12-29 RX ORDER — LISINOPRIL 40 MG/1
40 TABLET ORAL DAILY
Qty: 90 TABLET | Refills: 0 | Status: SHIPPED | OUTPATIENT
Start: 2022-12-29 | End: 2023-04-21

## 2022-12-29 NOTE — ASSESSMENT & PLAN NOTE
Given elevated blood pressure today in clinic and reported home blood pressure will increase lisinopril to 40 mg today discussed possibility of side effect of cough or scratchy throat.  Patient will contact us via [employer] for home blood pressure readings in 2 weeks.  If still elevated considering adding hydrochlorothiazide.  If side effect of cough persists or occurs we will also consider lisinopril 20 mg adding 25 mg of hydrochlorothiazide.  Encouraged patient to continue exercise and diet program she is doing well at this and is losing weight steadily.

## 2022-12-29 NOTE — PROGRESS NOTES
Assessment & Plan   Problem List Items Addressed This Visit        Digestive    Class 1 obesity with serious comorbidity and body mass index (BMI) of 31.0 to 31.9 in adult, unspecified obesity type       Circulatory    Essential hypertension, benign - Primary     Given elevated blood pressure today in clinic and reported home blood pressure will increase lisinopril to 40 mg today discussed possibility of side effect of cough or scratchy throat.  Patient will contact us via [employer] for home blood pressure readings in 2 weeks.  If still elevated considering adding hydrochlorothiazide.  If side effect of cough persists or occurs we will also consider lisinopril 20 mg adding 25 mg of hydrochlorothiazide.  Encouraged patient to continue exercise and diet program she is doing well at this and is losing weight steadily.         Relevant Medications    lisinopril (ZESTRIL) 40 MG tablet       Musculoskeletal and Integumentary    Chronic urticaria     Continue daily - no plan for specialist f/u at this time         Relevant Medications    albuterol (PROAIR HFA/PROVENTIL HFA/VENTOLIN HFA) 108 (90 Base) MCG/ACT inhaler   Other Visit Diagnoses     Establishing care with new doctor, encounter for        Screening for HIV (human immunodeficiency virus)        Relevant Orders    HIV Antigen Antibody Combo    Need for hepatitis C screening test        Relevant Orders    Hepatitis C Screen Reflex to HCV RNA Quant and Genotype    Primary hypertension        Relevant Medications    lisinopril (ZESTRIL) 40 MG tablet         Patient is agreeable today to Tdap vaccine and screening for HIV and hepatitis C.  All other labs are up-to-date.  Encouraged to schedule annual well visit for this year.    Review of the result(s) of each unique test - Lipid, CMP  No LOS data to display   Time spent doing chart review, history and exam, documentation and further activities per the note       BMI:   Estimated body mass index is 31.4 kg/m  as  "calculated from the following:    Height as of this encounter: 1.695 m (5' 6.75\").    Weight as of this encounter: 90.3 kg (199 lb).   Weight management plan: Discussed healthy diet and exercise guidelines Specific weight management program called exercise and heart healthy diet discussed        No follow-ups on file.    RICARDO Devi CNP  [facility] OLVIN Mendez is a 62 year old, presenting for the following health issues:  Hypertension (Take BP once/twice a week usually reading at 140/80-90 ; this morning was 186/111), Sinus Problem (Pressure on face ), and Establish Care    Sinus Problem   This is a chronic problem. The problem has not changed since onset.There has been no fever. She has tried other medications for the symptoms.   History of Present Illness       Hypertension: She presents for follow up of hypertension.  She does check blood pressure  regularly outside of the clinic. Outside blood pressures have been over 140/90. She follows a low salt diet.       Home -140, and 80-85   Took lisinopril 20mg denies HA, Blurry vision, CP, palpitations  Exercise: [facility] [employer] [facility] [employer] and walks the [place] and treadmill and weights, [employer] - runs - every other day.     Goal for 35 pounds off by birthday, wants to loose 6 pounds  Wt Readings from Last 3 Encounters:   12/29/22 90.3 kg (199 lb)   10/18/22 91.6 kg (202 lb)   09/01/22 95.3 kg (210 lb)     Chronic Urticaria: She does not have congestion - stable on allegra daily - gets OTC, denies pruritis today, no swelling of mouth or throat           Review of Systems   CONSTITUTIONAL: NEGATIVE for fever, chills, change in weight  ENT/MOUTH: NEGATIVE for ear, mouth and throat problems  RESP: NEGATIVE for significant cough or SOB  CV: NEGATIVE for chest pain, palpitations or peripheral edema      Objective    BP (!) 162/108 (BP Location: Left arm, Patient Position: Sitting, Cuff Size: Adult Large)   Pulse " "66   Temp 97.3  F (36.3  C) (Temporal)   Ht 1.695 m (5' 6.75\")   Wt 90.3 kg (199 lb)   SpO2 96%   BMI 31.40 kg/m    Body mass index is 31.4 kg/m .     Physical Exam   GENERAL: healthy, alert and no distress  NECK: no adenopathy, no asymmetry, masses, or scars and thyroid normal to palpation  RESP: lungs clear to auscultation - no rales, rhonchi or wheezes  CV: regular rate and rhythm, normal S1 S2, no S3 or S4, no murmur, click or rub, no peripheral edema and peripheral pulses strong      Lab Results   Component Value Date    WBC 7.3 03/25/2022    HGB 12.8 03/25/2022    HCT 38.1 03/25/2022     03/25/2022    CHOL 204 (H) 10/18/2022    TRIG 144 10/18/2022    HDL 46 (L) 10/18/2022    ALT 15 10/18/2022    AST 22 10/18/2022     10/18/2022    BUN 7.2 (L) 10/18/2022    CO2 26 10/18/2022    TSH 2.22 03/25/2022    INR 0.99 11/29/2020                     "

## 2023-02-08 ENCOUNTER — TELEPHONE (OUTPATIENT)
Dept: GASTROENTEROLOGY | Facility: CLINIC | Age: 63
End: 2023-02-08

## 2023-02-08 DIAGNOSIS — K62.5 RECTAL BLEEDING: Primary | ICD-10-CM

## 2023-02-08 RX ORDER — BISACODYL 5 MG/1
TABLET, DELAYED RELEASE ORAL
Qty: 4 TABLET | Refills: 0 | Status: SHIPPED | OUTPATIENT
Start: 2023-02-08 | End: 2023-04-21

## 2023-02-08 NOTE — TELEPHONE ENCOUNTER
Patient scheduled for Colonoscopy  on 2/21/2023.     Discuss Covid policy.     Pre op exam scheduled: N/A    Arrival time: 1230. Procedure time 1330    Facility location: [facility]; [address] [place] [address], [address] Floor, [place] [address]    Sedation type: MAC    Anticoagulations? No    Electronic implanted devices? No    Diabetic? No    Indication for procedure: Rectal bleeding    Bowel prep recommendation: Standard Golytely     Prep instructions sent via [employer] Bowel prep script sent to [facility] [address] [place] [address] [address] [place], [place] [address]WESLY VERGARA S    Pre visit planning completed.    Leticia Mora RN  Endoscopy Procedure Pre Assessment RN

## 2023-02-08 NOTE — TELEPHONE ENCOUNTER
Attempted to contact patient regarding upcoming Colonoscopy  procedure on 2/21/2023 for pre assessment questions. No answer.     Left message to return call to [phone] #[phone]    Discuss Covid policy and designated  policy.    Leticia Mora RN  Endoscopy Procedure Pre Assessment RN

## 2023-02-14 ENCOUNTER — TELEPHONE (OUTPATIENT)
Dept: GASTROENTEROLOGY | Facility: CLINIC | Age: 63
End: 2023-02-14
Payer: COMMERCIAL

## 2023-02-14 NOTE — TELEPHONE ENCOUNTER
Writer contacted pt who states she needs to cancel this Colonoscopy appt. Writer provided scheduling phone number to pt and transferred pt to scheduling line.   Leticia Mora RN

## 2023-02-14 NOTE — TELEPHONE ENCOUNTER
Caller: Vanessa Franco   Reason for Reschedule/Cancellation (please be detailed, any staff messages or encounters to note?):     PER PT -- CHANGE DATE -- DEATH IN FAMILY WILL BE OUT OF TOWN       Prior to reschedule please review:    Ordering Provider:Shayne Stuart MD      Sedation per order:MODERATE     Does patient have any ASC Exclusions, please identify?: N       Notes on Cancelled Procedure:  1. Procedure:Lower Endoscopy [Colonoscopy]   2. Date: 02/21/2023  3. Location:[facility]; [address] [place] [address], [address] [place] [address]  4. Surgeon: STEVIE         Rescheduled: YES     Procedure: Lower Endoscopy [Colonoscopy]    Date: 04/13/2023    Location:[facility]; [address] [place] [address], [address] [place] [address]    Surgeon: TAYLOR FERNANDES     Sedation Level Scheduled  MAC          Reason for Sedation Level PER (TE) 12/06/2022     Prep/Instructions updated and sent: [employer]

## 2023-03-30 ENCOUNTER — TELEPHONE (OUTPATIENT)
Dept: GASTROENTEROLOGY | Facility: CLINIC | Age: 63
End: 2023-03-30
Payer: COMMERCIAL

## 2023-03-30 NOTE — TELEPHONE ENCOUNTER
Reason for Reschedule/Cancellation (please be detailed, any staff messages or encounters to note?): Provider block release [NANCY Reagan.]      Prior to reschedule please review:    Ordering Provider:Shayne Stuart MD    Sedation per order:MAC    Does patient have any ASC Exclusions, please identify?: N      Notes on Cancelled Procedure:    Procedure:Lower Endoscopy [Colonoscopy]     Date: 04/13    Location:[facility]; [address] [place] [address], [address] Floor, [place] [address]    Surgeon: NANCY eRagan        Rescheduled: No - Case in [place]. Left message and sent Mychart.    Per RN review: history of anxiety and depression but that does not necessarily mean MAC is required. I think either conscious or MAC would be fine

## 2023-03-30 NOTE — LETTER
April 3, 2023  Re: Vanessa Franco  YOB: 1960    Dear Colleague,    Thank you for your referral to the [facility]. Our team reached out to patient to reschedule procedure due to our provider being out. Patient asked to cancel procedure at this time and will call back when ready to reschedule procedure.      If you have any questions or concerns, please contact our office at Dept: [phone].        Sincerely,  [facility]

## 2023-04-04 ENCOUNTER — TELEPHONE (OUTPATIENT)
Dept: FAMILY MEDICINE | Facility: CLINIC | Age: 63
End: 2023-04-04

## 2023-04-04 NOTE — TELEPHONE ENCOUNTER
Reason for Call:  Appointment Request    Patient requesting this type of appt:  Concerns for depression    Requested provider: Eli Coley    Reason patient unable to be scheduled: Not within requested timeframe    When does patient want to be seen/preferred time: 1-2 days    Comments: any provider at [facility]- willing to do a phone visit as well-     Could we send this information to you in [facility] or would you prefer to receive a phone call?:   Patient would prefer a phone call   Okay to leave a detailed message?: Yes at Cell number on file:    Telephone Information:   Mobile [phone]       Call taken on 4/4/2023 at 8:39 AM by Yeni PERERA

## 2023-04-05 ENCOUNTER — NURSE TRIAGE (OUTPATIENT)
Dept: NURSING | Facility: CLINIC | Age: 63
End: 2023-04-05
Payer: COMMERCIAL

## 2023-04-05 NOTE — TELEPHONE ENCOUNTER
"Triage Call:     Pt is calling today to get an appt with her PCP. She also called yesterday and writer can see there is a threat about that in patient's chart, but patient states that she has not received a call back yet.     Pt reports that she is having some depression that she states is usually worse around this time of year. She is not planning on harming herself, but she does state that she does sometimes think to herself \"I wish this feeling would go away...maybe if I wasn't here\" the depression would go away. She currently feels like her depression is making it hard for her to do her daily activities...\"It is starting to be a real struggle\".     Additional sx:   Lost her appetite  Distracted; not focussed  Poor sleep  Hopeless feeling  Sadness    Triggers for patient's depression sx:   Mother passed on  and her birthday is coming up in May  Lost a cousin in March; drove down to the  and the casket was closed; she did not get a chance to see her cousin after she  and does not feel she got the closure she needed    Hx of medication for depression: Pt is not on an antidepressant right now. She reports that she sas on an antidepressant in the past. It helped for a while and then stopped working and was taken off of it  She can not remember the name of it but it sas suppose to help her through menopause and irritability     Pt is interested in seeking a counselor and would like recommendations of medication and counselor referral from her PCP.     The disposition is for patient to go to the ED. Pt reports that she will go to the ED later today, but can not go right now due to  obligations.     Pt was given some phone numbers for resources to call  for her depression and writer is routing a message to patient's PCP to advise on a referral and follow up recommendation for medication or appointment per the request on 2023.     Trini Boggs RN  [facility] Nurse Advisor 10:34 AM " 4/5/2023    Reason for Disposition    Depression and unable to do any of normal activities (e.g., self care, school, work; in comparison to baseline).    Additional Information    Negative: Patient attempted suicide    Negative: Patient is threatening suicide now    Negative: Violent behavior, or threatening to physically hurt or kill someone    Negative: Patient is very confused (disoriented, slurred speech) and no other adult (e.g., friend or family member) available    Negative: Difficult to awaken or acting very confused (disoriented, slurred speech) and new-onset    Negative: Sounds like a life-threatening emergency to the triager    Negative: Suicide thoughts, threats, attempts, or questions    Negative: Questions or concerns about alcohol use, unhealthy alcohol use, binge drinking, intoxication, or withdrawal    Negative: Questions or concerns about substance use (drug use), unhealthy drug use, intoxication, or withdrawal    Negative: Anxiety is main problem or symptom    Protocols used: DEPRESSION-A-[place]

## 2023-04-06 ENCOUNTER — OFFICE VISIT (OUTPATIENT)
Dept: INTERNAL MEDICINE | Facility: CLINIC | Age: 63
End: 2023-04-06
Payer: COMMERCIAL

## 2023-04-06 VITALS
TEMPERATURE: 98 F | DIASTOLIC BLOOD PRESSURE: 92 MMHG | WEIGHT: 194 LBS | HEIGHT: 67 IN | BODY MASS INDEX: 30.45 KG/M2 | HEART RATE: 78 BPM | SYSTOLIC BLOOD PRESSURE: 142 MMHG | RESPIRATION RATE: 18 BRPM | OXYGEN SATURATION: 97 %

## 2023-04-06 DIAGNOSIS — I10 ESSENTIAL HYPERTENSION, BENIGN: ICD-10-CM

## 2023-04-06 DIAGNOSIS — F32.A DEPRESSION, UNSPECIFIED DEPRESSION TYPE: Primary | ICD-10-CM

## 2023-04-06 PROCEDURE — 99214 OFFICE O/P EST MOD 30 MIN: CPT | Performed by: NURSE PRACTITIONER

## 2023-04-06 PROCEDURE — 96127 BRIEF EMOTIONAL/BEHAV ASSMT: CPT | Performed by: NURSE PRACTITIONER

## 2023-04-06 RX ORDER — FLUOXETINE 10 MG/1
10 CAPSULE ORAL DAILY
Qty: 90 CAPSULE | Refills: 0 | Status: SHIPPED | OUTPATIENT
Start: 2023-04-06 | End: 2023-04-21

## 2023-04-06 RX ORDER — LATANOPROST 50 UG/ML
SOLUTION/ DROPS OPHTHALMIC
COMMUNITY
Start: 2023-02-22 | End: 2023-09-22

## 2023-04-06 ASSESSMENT — PATIENT HEALTH QUESTIONNAIRE - PHQ9
SUM OF ALL RESPONSES TO PHQ QUESTIONS 1-9: 21
10. IF YOU CHECKED OFF ANY PROBLEMS, HOW DIFFICULT HAVE THESE PROBLEMS MADE IT FOR YOU TO DO YOUR WORK, TAKE CARE OF THINGS AT HOME, OR GET ALONG WITH OTHER PEOPLE: EXTREMELY DIFFICULT
SUM OF ALL RESPONSES TO PHQ QUESTIONS 1-9: 21

## 2023-04-06 NOTE — PATIENT INSTRUCTIONS
Start fluoxetine (Prozac) 10 mg daily.    It is normal for patients to feel a bit unwell for the first week of using these medications.  Symptoms can include lightheaded dizzy spells, mild nausea, headache, and upset stomach.    The side effects usually go away after the first 7 to 10 days of using medication.    Follow-up with primary doctor in 2 weeks for recheck.  If you are feeling okay at that appointment, you could consider increasing the dose of the medication to 20 mg daily.    Referral to mental health placed today.  Somebody will be contacting you within the next couple of business days to set up a therapy appointment.

## 2023-04-06 NOTE — TELEPHONE ENCOUNTER
RN called patient.     Was able to schedule same day with provider.    HARSHAL Cordova  [facility]

## 2023-04-06 NOTE — PROGRESS NOTES
Assessment & Plan     Depression, unspecified depression type  PHQ-9 is 21 today.  She had been on Effexor in the past but did not like the way this medication made her feel (nausea).    We will try Prozac.  Follow-up with PCP in 2 weeks for recheck    - FLUoxetine (PROZAC) 10 MG capsule; Take 1 capsule (10 mg) by mouth daily  - [facility] Referral; Future    Essential hypertension, benign  Mildly elevated today.  We will make any changes to her medications.  She will follow-up with her PCP in 2 weeks for recheck    Patient Instructions   Start fluoxetine (Prozac) 10 mg daily.    It is normal for patients to feel a bit unwell for the first week of using these medications.  Symptoms can include lightheaded dizzy spells, mild nausea, headache, and upset stomach.    The side effects usually go away after the first 7 to 10 days of using medication.    Follow-up with primary doctor in 2 weeks for recheck.  If you are feeling okay at that appointment, you could consider increasing the dose of the medication to 20 mg daily.    Referral to mental health placed today.  Somebody will be contacting you within the next couple of business days to set up a therapy appointment.      Harris Costello, [facility]    Vicky Mendez is a 62 year old, presenting for the following health issues:    Depression (Not sleeping or eating)        4/6/2023     4:06 PM   Additional Questions   Roomed by Trini DENTON   Accompanied by kristen         4/6/2023     4:06 PM   Patient Reported Additional Medications   Patient reports taking the following new medications no     History of Present Illness       Reason for visit:  Depression    She eats 0-1 servings of fruits and vegetables daily.She consumes 0 sweetened beverage(s) daily.She exercises with enough effort to increase her heart rate 9 or less minutes per day.  She exercises with enough effort to increase her heart rate 3 or less days  "per week. She is missing 2 dose(s) of medications per week.  She is not taking prescribed medications regularly due to other.    Today's PHQ-9         PHQ-9 Total Score: 21    PHQ-9 Q9 Thoughts of better off dead/self-harm past 2 weeks :   Not at all    How difficult have these problems made it for you to do your work, take care of things at home, or get along with other people: Extremely difficult           Review of Systems   Constitutional, HEENT, cardiovascular, pulmonary, gi and gu systems are negative, except as otherwise noted.      Objective    BP (!) 142/92   Pulse 78   Temp 98  F (36.7  C)   Resp 18   Ht 1.695 m (5' 6.75\")   Wt 88 kg (194 lb)   SpO2 97%   BMI 30.61 kg/m    Body mass index is 30.61 kg/m .  Physical Exam     Tearful at times.  Otherwise alert oriented and in no acute distress      "

## 2023-04-06 NOTE — TELEPHONE ENCOUNTER
Provider Response to 2nd Level Triage Request    I have reviewed the RN documentation. My recommendation is:  Refer to Urgent Care    Any provider to be seen in the next couple days - other wise place in any blocked visit with me as soon as available.

## 2023-04-21 ENCOUNTER — OFFICE VISIT (OUTPATIENT)
Dept: FAMILY MEDICINE | Facility: CLINIC | Age: 63
End: 2023-04-21
Payer: COMMERCIAL

## 2023-04-21 VITALS
HEIGHT: 66 IN | OXYGEN SATURATION: 97 % | HEART RATE: 67 BPM | DIASTOLIC BLOOD PRESSURE: 89 MMHG | SYSTOLIC BLOOD PRESSURE: 156 MMHG | WEIGHT: 196 LBS | BODY MASS INDEX: 31.5 KG/M2

## 2023-04-21 DIAGNOSIS — F33.0 MILD EPISODE OF RECURRENT MAJOR DEPRESSIVE DISORDER (H): Primary | ICD-10-CM

## 2023-04-21 DIAGNOSIS — I10 ESSENTIAL HYPERTENSION, BENIGN: ICD-10-CM

## 2023-04-21 PROCEDURE — 99214 OFFICE O/P EST MOD 30 MIN: CPT | Performed by: NURSE PRACTITIONER

## 2023-04-21 RX ORDER — AMLODIPINE BESYLATE 5 MG/1
5 TABLET ORAL DAILY
Qty: 90 TABLET | Refills: 0 | Status: SHIPPED | OUTPATIENT
Start: 2023-04-21 | End: 2023-07-17

## 2023-04-21 RX ORDER — FLUOXETINE 10 MG/1
10 CAPSULE ORAL DAILY
Qty: 90 CAPSULE | Refills: 3 | Status: SHIPPED | OUTPATIENT
Start: 2023-04-21 | End: 2023-12-14

## 2023-04-21 RX ORDER — LISINOPRIL 40 MG/1
40 TABLET ORAL DAILY
Qty: 90 TABLET | Refills: 3 | Status: SHIPPED | OUTPATIENT
Start: 2023-04-21 | End: 2023-12-14

## 2023-04-21 ASSESSMENT — ANXIETY QUESTIONNAIRES
3. WORRYING TOO MUCH ABOUT DIFFERENT THINGS: NOT AT ALL
IF YOU CHECKED OFF ANY PROBLEMS ON THIS QUESTIONNAIRE, HOW DIFFICULT HAVE THESE PROBLEMS MADE IT FOR YOU TO DO YOUR WORK, TAKE CARE OF THINGS AT HOME, OR GET ALONG WITH OTHER PEOPLE: NOT DIFFICULT AT ALL
GAD7 TOTAL SCORE: 0
2. NOT BEING ABLE TO STOP OR CONTROL WORRYING: NOT AT ALL
1. FEELING NERVOUS, ANXIOUS, OR ON EDGE: NOT AT ALL
7. FEELING AFRAID AS IF SOMETHING AWFUL MIGHT HAPPEN: NOT AT ALL
6. BECOMING EASILY ANNOYED OR IRRITABLE: NOT AT ALL
8. IF YOU CHECKED OFF ANY PROBLEMS, HOW DIFFICULT HAVE THESE MADE IT FOR YOU TO DO YOUR WORK, TAKE CARE OF THINGS AT HOME, OR GET ALONG WITH OTHER PEOPLE?: NOT DIFFICULT AT ALL
5. BEING SO RESTLESS THAT IT IS HARD TO SIT STILL: NOT AT ALL
7. FEELING AFRAID AS IF SOMETHING AWFUL MIGHT HAPPEN: NOT AT ALL
GAD7 TOTAL SCORE: 0
GAD7 TOTAL SCORE: 0
4. TROUBLE RELAXING: NOT AT ALL

## 2023-04-21 ASSESSMENT — PATIENT HEALTH QUESTIONNAIRE - PHQ9
SUM OF ALL RESPONSES TO PHQ QUESTIONS 1-9: 3
SUM OF ALL RESPONSES TO PHQ QUESTIONS 1-9: 3
10. IF YOU CHECKED OFF ANY PROBLEMS, HOW DIFFICULT HAVE THESE PROBLEMS MADE IT FOR YOU TO DO YOUR WORK, TAKE CARE OF THINGS AT HOME, OR GET ALONG WITH OTHER PEOPLE: NOT DIFFICULT AT ALL

## 2023-04-21 NOTE — PATIENT INSTRUCTIONS
Call this number to make an appointment with a psychotherapist if you are wanting to consider therapy:[phone].    Your goal blood pressure is  Below 140/90

## 2023-04-21 NOTE — PROGRESS NOTES
Assessment & Plan     Mild episode of recurrent major depressive disorder (H)    Patient doing well with Prozac 10 mg.  Discussed continue medication daily.  Discussed do not stop medication on her own.  Discussed that this usually needs to be tapered down.    She does have an open referral for therapy.  I will give her the phone number in her patient instructions today for her to call if she is desiring therapy at home.    Sometimes takes a sleep aid for sleep but otherwise doing well  - FLUoxetine (PROZAC) 10 MG capsule  Dispense: 90 capsule; Refill: 3    Essential hypertension, benign    Discussed adding a second medication Per chart review she did not do well on beta-blockers and he did have potassium supplement for chlorthalidone.  Therefore we will add amlodipine 5 mg.  Discussed common side effects of medication including lower extremity edema and lightheadedness when going from lying or sitting to standing quickly.  Discussed stay hydrated and have a low-salt diet.  Typically a lot of the symptoms do resolve after the first couple weeks of use.      Patient will call me or write me a [place] message in 2 weeks to tell me her home blood pressures.  If we do need to titrate up again I will have her come in for an RN recheck after second titration of amlodipine from 5 to 10 mg if her blood pressure is not at goal below 140/90    - lisinopril (ZESTRIL) 40 MG tablet  Dispense: 90 tablet; Refill: 3  - amLODIPine (NORVASC) 5 MG tablet  Dispense: 90 tablet; Refill: 0    Patient verbalized understanding and agrees with plan of care         RICARDO Devi CNP  [facility]   Vanessa is a 62 year old, presenting for the following health issues:  Follow Up (F?U for depression and anxiety. States it has gotten a lot better and medication is helping. )        4/21/2023     1:10 PM   Additional Questions   Roomed by Area F   Accompanied by Kash     History of Present  Illness       Mental Health Follow-up:  Patient presents to follow-up on Depression & Anxiety.Patient's depression since last visit has been:  Better  The patient is not having other symptoms associated with depression.  Patient's anxiety since last visit has been:  Better  The patient is not having other symptoms associated with anxiety.  Any significant life events: grief or loss  Patient is not feeling anxious or having panic attacks.  Patient has no concerns about alcohol or drug use.    Hypertension: She presents for follow up of hypertension.  She does check blood pressure  regularly outside of the clinic. Outside blood pressures have been over 140/90. She follows a low salt diet.     She eats 2-3 servings of fruits and vegetables daily.She consumes 1 sweetened beverage(s) daily.She exercises with enough effort to increase her heart rate 30 to 60 minutes per day.  She exercises with enough effort to increase her heart rate 4 days per week.   She is taking medications regularly.    Today's PHQ-9         PHQ-9 Total Score: 3    PHQ-9 Q9 Thoughts of better off dead/self-harm past 2 weeks :   Not at all    How difficult have these problems made it for you to do your work, take care of things at home, or get along with other people: Not difficult at all  Today's ALEISHA-7 Score: 0     Patient seen by internal medicine provider Trang on 4/6/2023.  she was started on fluoxetine 10 mg daily.  PHQ-9 at that time was 21    PHQ-9 today is 3 -she says she is doing much better and questions if she should start therapy.  She does not know what it was that caused her depression to be so bad.  It started with a panic attack and feels like it spiraled from there.  She does have a history of depression but had never been this bad when she is when she had seen the provider at the beginning of April    Took lisinopril this morning, no cough, co palpitations, Blood pressure is elevated today.  Blood pressure was elevated last visit  "-142/92            Review of Systems   Constitutional, HEENT, cardiovascular, pulmonary, gi and gu systems are negative, except as otherwise noted.      Objective    BP (!) 156/89 (BP Location: Left arm, Patient Position: Sitting, Cuff Size: Adult Regular)   Pulse 67   Ht 1.684 m (5' 6.3\")   Wt 88.9 kg (196 lb)   SpO2 97%   BMI 31.35 kg/m    Body mass index is 31.35 kg/m .  Physical Exam   GENERAL: healthy, alert and no distress  NECK: no adenopathy, no asymmetry, masses, or scars and thyroid normal to palpation  CV: regular rate and rhythm, normal S1 S2, no S3 or S4, no murmur, click or rub, no peripheral edema and peripheral pulses strong  MS: no gross musculoskeletal defects noted, no edema  PSYCH: mentation appears normal, affect normal/bright                    "

## 2023-05-30 ENCOUNTER — MYC MEDICAL ADVICE (OUTPATIENT)
Dept: FAMILY MEDICINE | Facility: CLINIC | Age: 63
End: 2023-05-30
Payer: COMMERCIAL

## 2023-07-11 DIAGNOSIS — I10 ESSENTIAL HYPERTENSION, BENIGN: ICD-10-CM

## 2023-07-11 NOTE — TELEPHONE ENCOUNTER
Refill request for amLODIPine (NORVASC) 5 MG tablet  Pharmacy: [id number] - [facility] [address]

## 2023-07-12 NOTE — TELEPHONE ENCOUNTER
"Routing refill request to provider for review/approval because:  Labs out of range:  BP  Patient was to update PCP with recent blood pressures with no MyChart messages with readings. Please advise.     Last Written Prescription Date:  4/21/23  Last Fill Quantity: 90,  # refills: 0   Last office visit provider:   4/21/23    Requested Prescriptions   Pending Prescriptions Disp Refills     amLODIPine (NORVASC) 5 MG tablet 90 tablet 0     Sig: Take 1 tablet (5 mg) by mouth daily       Calcium Channel Blockers Protocol  Failed - 7/11/2023  1:45 PM        Failed - Blood pressure under 140/90 in past 12 months     BP Readings from Last 3 Encounters:   04/21/23 (!) 156/89   04/06/23 (!) 142/92   12/29/22 (!) 162/108                 Passed - Recent (12 mo) or future (30 days) visit within the authorizing provider's specialty     Patient has had an office visit with the authorizing provider or a provider within the authorizing providers department within the previous 12 mos or has a future within next 30 days. See \"Patient Info\" tab in inbasket, or \"Choose Columns\" in Meds & Orders section of the refill encounter.              Passed - Medication is active on med list        Passed - Patient is age 18 or older        Passed - No active pregnancy on record        Passed - Normal serum creatinine on file in past 12 months     Recent Labs   Lab Test 10/18/22  0841   CR 0.75       Ok to refill medication if creatinine is low          Passed - No positive pregnancy test in past 12 months             Valerie Paulino RN 07/12/23 4:47 AM  "

## 2023-07-17 RX ORDER — AMLODIPINE BESYLATE 5 MG/1
5 TABLET ORAL DAILY
Qty: 90 TABLET | Refills: 0 | Status: SHIPPED | OUTPATIENT
Start: 2023-07-17 | End: 2023-09-22

## 2023-08-06 ENCOUNTER — HEALTH MAINTENANCE LETTER (OUTPATIENT)
Age: 63
End: 2023-08-06

## 2023-08-08 ENCOUNTER — TELEPHONE (OUTPATIENT)
Dept: FAMILY MEDICINE | Facility: CLINIC | Age: 63
End: 2023-08-08

## 2023-08-08 NOTE — TELEPHONE ENCOUNTER
Reason for Call:  Appointment Request    Patient requesting this type of appt:  ANKLE KEEPS GOING OUT ON HER, BOTTOM OF FOOT PAINFUL    Requested provider: Eli Coley    Reason patient unable to be scheduled: Not within requested timeframe    When does patient want to be seen/preferred time: 1-2 days    Comments:      Could we send this information to you in [place] or would you prefer to receive a phone call?:   Patient would prefer a phone call   Okay to leave a detailed message?: Yes at Cell number on file:    Telephone Information:   Mobile [phone]       Call taken on 8/8/2023 at 11:20 AM by Yeni PERERA

## 2023-08-09 ENCOUNTER — OFFICE VISIT (OUTPATIENT)
Dept: FAMILY MEDICINE | Facility: CLINIC | Age: 63
End: 2023-08-09
Payer: COMMERCIAL

## 2023-08-09 VITALS
DIASTOLIC BLOOD PRESSURE: 90 MMHG | HEART RATE: 65 BPM | WEIGHT: 195.8 LBS | TEMPERATURE: 98.8 F | OXYGEN SATURATION: 98 % | BODY MASS INDEX: 31.47 KG/M2 | SYSTOLIC BLOOD PRESSURE: 158 MMHG | RESPIRATION RATE: 28 BRPM | HEIGHT: 66 IN

## 2023-08-09 DIAGNOSIS — I10 ESSENTIAL HYPERTENSION, BENIGN: ICD-10-CM

## 2023-08-09 DIAGNOSIS — M54.2 NECK PAIN: ICD-10-CM

## 2023-08-09 DIAGNOSIS — M72.2 PLANTAR FASCIITIS: Primary | ICD-10-CM

## 2023-08-09 DIAGNOSIS — R51.9 NONINTRACTABLE HEADACHE, UNSPECIFIED CHRONICITY PATTERN, UNSPECIFIED HEADACHE TYPE: ICD-10-CM

## 2023-08-09 PROCEDURE — 99214 OFFICE O/P EST MOD 30 MIN: CPT | Performed by: NURSE PRACTITIONER

## 2023-08-09 RX ORDER — CYCLOBENZAPRINE HCL 5 MG
5 TABLET ORAL 3 TIMES DAILY PRN
Qty: 20 TABLET | Refills: 0 | Status: SHIPPED | OUTPATIENT
Start: 2023-08-09 | End: 2023-09-22

## 2023-08-09 RX ORDER — LIDOCAINE 50 MG/G
1 PATCH TOPICAL EVERY 24 HOURS
Qty: 30 PATCH | Refills: 0 | Status: SHIPPED | OUTPATIENT
Start: 2023-08-09 | End: 2023-09-22

## 2023-08-09 ASSESSMENT — ENCOUNTER SYMPTOMS: HEADACHES: 1
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
## 2023-08-09 NOTE — PATIENT INSTRUCTIONS
Take BP at home -message me or Eli Coley with readings  Nurse only BP check In 2-4 weeks  May need to increase amlodipine.

## 2023-08-11 ENCOUNTER — TELEPHONE (OUTPATIENT)
Dept: FAMILY MEDICINE | Facility: CLINIC | Age: 63
End: 2023-08-11
Payer: COMMERCIAL

## 2023-08-15 ENCOUNTER — TELEPHONE (OUTPATIENT)
Dept: NURSING | Facility: CLINIC | Age: 63
End: 2023-08-15
Payer: COMMERCIAL

## 2023-08-15 NOTE — TELEPHONE ENCOUNTER
PA team will provide update when update is available. Further information will be in 8/11/2023 Prior Authorization telephone encounter.

## 2023-08-15 NOTE — TELEPHONE ENCOUNTER
Pharmacy calling back for PA update from last week.     Will route to care team, please see previous encounter    Shawnee Leslie RN, BSN  8/15/2023 at 12:22 PM  [facility]

## 2023-08-16 NOTE — TELEPHONE ENCOUNTER
PA Initiation    Medication: LIDOCAINE 5 % EX PTCH  Insurance Company: [employer] - Phone [phone] Fax [phone]  Pharmacy Filling the Rx: [facility] [address] [place] [address] YAZAN CARR  Filling Pharmacy Phone: [phone]  Filling Pharmacy Fax:    Start Date: 8/16/2023

## 2023-08-17 NOTE — TELEPHONE ENCOUNTER
PRIOR AUTHORIZATION DENIED    Medication: LIDOCAINE 5 % EX PTCH  Insurance Company: [employer] - Phone [phone] Fax [phone]  Denial Date: 8/17/2023  Denial Rational: Diagnosis is not covered      Appeal Information:   Patient Notified: No

## 2023-09-22 ENCOUNTER — OFFICE VISIT (OUTPATIENT)
Dept: FAMILY MEDICINE | Facility: CLINIC | Age: 63
End: 2023-09-22
Payer: COMMERCIAL

## 2023-09-22 VITALS
TEMPERATURE: 97.8 F | SYSTOLIC BLOOD PRESSURE: 134 MMHG | HEART RATE: 70 BPM | WEIGHT: 199.7 LBS | DIASTOLIC BLOOD PRESSURE: 87 MMHG | BODY MASS INDEX: 32.09 KG/M2 | HEIGHT: 66 IN | OXYGEN SATURATION: 95 %

## 2023-09-22 DIAGNOSIS — G44.301 INTRACTABLE POST-TRAUMATIC HEADACHE, UNSPECIFIED CHRONICITY PATTERN: ICD-10-CM

## 2023-09-22 DIAGNOSIS — J30.2 SEASONAL ALLERGIES: ICD-10-CM

## 2023-09-22 DIAGNOSIS — J45.20 MILD INTERMITTENT ASTHMA WITHOUT COMPLICATION: ICD-10-CM

## 2023-09-22 DIAGNOSIS — I10 ESSENTIAL HYPERTENSION, BENIGN: Primary | ICD-10-CM

## 2023-09-22 DIAGNOSIS — Z23 NEED FOR SHINGLES VACCINE: ICD-10-CM

## 2023-09-22 PROCEDURE — 99214 OFFICE O/P EST MOD 30 MIN: CPT | Mod: 25 | Performed by: FAMILY MEDICINE

## 2023-09-22 PROCEDURE — 90750 HZV VACC RECOMBINANT IM: CPT | Performed by: FAMILY MEDICINE

## 2023-09-22 PROCEDURE — 90471 IMMUNIZATION ADMIN: CPT | Performed by: FAMILY MEDICINE

## 2023-09-22 RX ORDER — AMLODIPINE BESYLATE 5 MG/1
5 TABLET ORAL DAILY
Qty: 90 TABLET | Refills: 3 | Status: SHIPPED | OUTPATIENT
Start: 2023-09-22 | End: 2023-12-14

## 2023-09-22 RX ORDER — ALBUTEROL SULFATE 90 UG/1
2 AEROSOL, METERED RESPIRATORY (INHALATION) EVERY 6 HOURS PRN
Qty: 18 G | Refills: 5 | Status: SHIPPED | OUTPATIENT
Start: 2023-09-22 | End: 2023-12-14
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
## 2023-10-16 ENCOUNTER — TRANSFERRED RECORDS (OUTPATIENT)
Dept: HEALTH INFORMATION MANAGEMENT | Facility: CLINIC | Age: 63
End: 2023-10-16
Payer: COMMERCIAL

## 2023-11-09 ENCOUNTER — PATIENT OUTREACH (OUTPATIENT)
Dept: CARE COORDINATION | Facility: CLINIC | Age: 63
End: 2023-11-09
Payer: COMMERCIAL

## 2023-11-28 ENCOUNTER — HOSPITAL ENCOUNTER (OUTPATIENT)
Dept: MAMMOGRAPHY | Facility: CLINIC | Age: 63
Discharge: HOME | End: 2023-11-28
Attending: NURSE PRACTITIONER | Admitting: NURSE PRACTITIONER
Payer: COMMERCIAL

## 2023-11-28 DIAGNOSIS — Z12.31 VISIT FOR SCREENING MAMMOGRAM: ICD-10-CM

## 2023-11-28 PROCEDURE — 77067 SCR MAMMO BI INCL CAD: CPT

## 2023-12-14 ENCOUNTER — OFFICE VISIT (OUTPATIENT)
Dept: FAMILY MEDICINE | Facility: CLINIC | Age: 63
End: 2023-12-14
Payer: COMMERCIAL

## 2023-12-14 VITALS
HEART RATE: 86 BPM | SYSTOLIC BLOOD PRESSURE: 121 MMHG | TEMPERATURE: 98.1 F | OXYGEN SATURATION: 95 % | DIASTOLIC BLOOD PRESSURE: 78 MMHG | WEIGHT: 197 LBS | HEIGHT: 67 IN | BODY MASS INDEX: 30.92 KG/M2

## 2023-12-14 DIAGNOSIS — J45.20 MILD INTERMITTENT ASTHMA WITHOUT COMPLICATION: ICD-10-CM

## 2023-12-14 DIAGNOSIS — Z23 IMMUNIZATION DUE: ICD-10-CM

## 2023-12-14 DIAGNOSIS — Z00.00 ANNUAL PHYSICAL EXAM: Primary | ICD-10-CM

## 2023-12-14 DIAGNOSIS — I10 ESSENTIAL HYPERTENSION, BENIGN: ICD-10-CM

## 2023-12-14 DIAGNOSIS — F33.1 MODERATE EPISODE OF RECURRENT MAJOR DEPRESSIVE DISORDER (H): ICD-10-CM

## 2023-12-14 PROCEDURE — 99214 OFFICE O/P EST MOD 30 MIN: CPT | Mod: 25 | Performed by: FAMILY MEDICINE

## 2023-12-14 PROCEDURE — 90750 HZV VACC RECOMBINANT IM: CPT | Performed by: FAMILY MEDICINE

## 2023-12-14 PROCEDURE — 90471 IMMUNIZATION ADMIN: CPT | Performed by: FAMILY MEDICINE

## 2023-12-14 PROCEDURE — 90480 ADMN SARSCOV2 VAC 1/ONLY CMP: CPT | Performed by: FAMILY MEDICINE

## 2023-12-14 PROCEDURE — 91320 SARSCV2 VAC 30MCG TRS-SUC IM: CPT | Performed by: FAMILY MEDICINE

## 2023-12-14 PROCEDURE — 90472 IMMUNIZATION ADMIN EACH ADD: CPT | Performed by: FAMILY MEDICINE

## 2023-12-14 PROCEDURE — 90678 RSV VACC PREF BIVALENT IM: CPT | Performed by: FAMILY MEDICINE

## 2023-12-14 PROCEDURE — 99396 PREV VISIT EST AGE 40-64: CPT | Mod: 25 | Performed by: FAMILY MEDICINE

## 2023-12-14 RX ORDER — AMLODIPINE BESYLATE 5 MG/1
5 TABLET ORAL DAILY
Qty: 90 TABLET | Refills: 3 | Status: SHIPPED | OUTPATIENT
Start: 2023-12-14 | End: 2024-09-03

## 2023-12-14 RX ORDER — LISINOPRIL 40 MG/1
40 TABLET ORAL DAILY
Qty: 90 TABLET | Refills: 3 | Status: SHIPPED | OUTPATIENT
Start: 2023-12-14 | End: 2024-04-29

## 2023-12-14 RX ORDER — FLUOXETINE 10 MG/1
10 CAPSULE ORAL DAILY
Qty: 90 CAPSULE | Refills: 3 | Status: SHIPPED | OUTPATIENT
Start: 2023-12-14

## 2023-12-14 RX ORDER — ALBUTEROL SULFATE 90 UG/1
2 AEROSOL, METERED RESPIRATORY (INHALATION) EVERY 6 HOURS PRN
Qty: 18 G | Refills: 5 | Status: SHIPPED | OUTPATIENT
Start: 2023-12-14

## 2023-12-14 ASSESSMENT — ENCOUNTER SYMPTOMS
DIZZINESS: 0
HEADACHES: 0
EYE PAIN: 0
CONSTIPATION: 0
ARTHRALGIAS: 0
HEMATOCHEZIA: 0
ABDOMINAL PAIN: 1
FREQUENCY: 1
FEVER: 0
MYALGIAS: 0
PALPITATIONS: 0
BREAST MASS: 0
HEARTBURN: 0
SHORTNESS OF BREATH: 0
DIARRHEA: 0
NERVOUS/ANXIOUS: 0
WEAKNESS: 0
CHILLS: 0
HEMATURIA: 0
JOINT SWELLING: 0
PARESTHESIAS: 0
DYSURIA: 0
SORE THROAT: 0
NAUSEA: 0
COUGH: 0

## 2023-12-14 ASSESSMENT — ASTHMA QUESTIONNAIRES: ACT_TOTALSCORE: 20

## 2023-12-14 ASSESSMENT — PATIENT HEALTH QUESTIONNAIRE - PHQ9
SUM OF ALL RESPONSES TO PHQ QUESTIONS 1-9: 0
SUM OF ALL RESPONSES TO PHQ QUESTIONS 1-9: 0
10. IF YOU CHECKED OFF ANY PROBLEMS, HOW DIFFICULT HAVE THESE PROBLEMS MADE IT FOR YOU TO DO YOUR WORK, TAKE CARE OF THINGS AT HOME, OR GET ALONG WITH OTHER PEOPLE: NOT DIFFICULT AT ALL

## 2023-12-14 ASSESSMENT — PAIN SCALES - GENERAL: PAINLEVEL: NO PAIN (0)

## 2023-12-14 NOTE — PROGRESS NOTES
SUBJECTIVE:   DAVID is a 63 year old, presenting for the following:  Physical (Not fasting. Discuss mammogram results) and Establish Care        12/14/2023    10:32 AM   Additional Questions   Roomed by Tanja Patel Visit Facilitator     Healthy Habits:     Getting at least 3 servings of Calcium per day:  Yes    Bi-annual eye exam:  Yes    Dental care twice a year:  Yes    Sleep apnea or symptoms of sleep apnea:  Excessive snoring    Diet:  Low salt    Frequency of exercise:  4-5 days/week    Duration of exercise:  30-45 minutes    Taking medications regularly:  Yes    Medication side effects:  None    Additional concerns today:  No    MDD: Patient states she is doing well with fluoxetine 10 mg daily, she denies any side effects and mood is well-controlled.  She scored a 0 on her PHQ-9 today.    HTN: Patient is tolerating amlodipine 5 mg daily and lisinopril 40 mg daily without side effects.  She denies any edema.    Asthma: Patient is using albuterol a few times a week, worse during the cold weather months.  She has an appointment to see an allergist in February.    Today's PHQ-9 Score:       12/14/2023    10:31 AM   PHQ-9 SCORE   PHQ-9 Total Score MyChart 0   PHQ-9 Total Score 0           Have you ever done Advance Care Planning? (For example, a Health Directive, POLST, or a discussion with a medical provider or your loved ones about your wishes): Yes, advance care planning is on file.    Social History     Tobacco Use     Smoking status: Never     Passive exposure: Never     Smokeless tobacco: Never   Substance Use Topics     Alcohol use: Yes     Alcohol/week: 1.0 standard drink of alcohol     Comment: Alcoholic Drinks/day: Yearly     Reviewed orders with patient.  Reviewed health maintenance and updated orders accordingly - Yes    Breast Cancer Screening:  Any new diagnosis of family breast, ovarian, or bowel cancer? No    FHS-7:       12/9/2021    10:39 AM 11/28/2023     7:20 AM   Breast CA Risk Assessment  (FHS-7)   Did any of your first-degree relatives have breast or ovarian cancer? Yes Yes   Did any of your relatives have bilateral breast cancer? Unknown Unknown   Did any man in your family have breast cancer? No No   Did any woman in your family have breast and ovarian cancer? No No   Did any woman in your family have breast cancer before age 50 y? No No   Do you have 2 or more relatives with breast and/or ovarian cancer? No No   Do you have 2 or more relatives with breast and/or bowel cancer? No No         Pertinent mammograms are reviewed under the imaging tab.    History of abnormal Pap smear: Status post benign hysterectomy. Health Maintenance and Surgical History updated.     Reviewed and updated as needed this visit by clinical staff   Tobacco  Allergies  Meds              Reviewed and updated as needed this visit by Provider                     Review of Systems   Constitutional:  Negative for chills and fever.   HENT:  Negative for congestion, ear pain, hearing loss and sore throat.    Eyes:  Negative for pain and visual disturbance.   Respiratory:  Negative for cough and shortness of breath.    Cardiovascular:  Negative for chest pain, palpitations and peripheral edema.   Gastrointestinal:  Positive for abdominal pain. Negative for constipation, diarrhea, heartburn, hematochezia and nausea.   Breasts:  Negative for tenderness, breast mass and discharge.   Genitourinary:  Positive for frequency. Negative for dysuria, genital sores, hematuria, pelvic pain, urgency, vaginal bleeding and vaginal discharge.   Musculoskeletal:  Negative for arthralgias, joint swelling and myalgias.   Skin:  Negative for rash.   Neurological:  Negative for dizziness, weakness, headaches and paresthesias.   Psychiatric/Behavioral:  Negative for mood changes. The patient is not nervous/anxious.           OBJECTIVE:   /78 (BP Location: Left arm, Patient Position: Sitting, Cuff Size: Adult Large)   Pulse 86   Temp 98.1  F  "(36.7  C) (Oral)   Ht 1.689 m (5' 6.5\")   Wt 89.4 kg (197 lb)   SpO2 95%   BMI 31.32 kg/m    Physical Exam  GENERAL: healthy, alert and no distress  EYES: Eyes grossly normal to inspection, PERRL and conjunctivae and sclerae normal  HENT: ear canals and TM's normal, nose and mouth without ulcers or lesions  NECK: no adenopathy, no asymmetry, masses, or scars and thyroid normal to palpation  RESP: lungs clear to auscultation - no rales, rhonchi or wheezes  CV: regular rate and rhythm, normal S1 S2, no S3 or S4, no murmur, click or rub, no peripheral edema and peripheral pulses strong  ABDOMEN: soft, nontender, no hepatosplenomegaly, no masses and bowel sounds normal  MS: no gross musculoskeletal defects noted, no edema  SKIN: no suspicious lesions or rashes  NEURO: Normal strength and tone, mentation intact and speech normal  PSYCH: mentation appears normal, affect normal/bright        ASSESSMENT/PLAN:   1. Annual physical exam  Encouraged healthy lifestyle.  Check labs and notify with results.  - Lipid Profile; Future  - TSH with free T4 reflex; Future  - Comprehensive metabolic panel; Future  - CBC with Platelets & Differential; Future  - Hemoglobin A1c; Future    2. Moderate episode of recurrent major depressive disorder (H)  Controlled.  Refill fluoxetine for the year.  Follow-up annually or sooner with any concerns.  - FLUoxetine (PROZAC) 10 MG capsule; Take 1 capsule (10 mg) by mouth daily  Dispense: 90 capsule; Refill: 3    3. Essential hypertension, benign  Controlled.  Refill lisinopril and amlodipine.  Follow-up annually.  - lisinopril (ZESTRIL) 40 MG tablet; Take 1 tablet (40 mg) by mouth daily  Dispense: 90 tablet; Refill: 3  - amLODIPine (NORVASC) 5 MG tablet; Take 1 tablet (5 mg) by mouth daily  Dispense: 90 tablet; Refill: 3    4. Mild intermittent asthma without complication  Stable.  Continue albuterol and awaiting appointment with allergist.  - albuterol (PROAIR HFA/PROVENTIL HFA/VENTOLIN HFA) " 108 (90 Base) MCG/ACT inhaler; Inhale 2 puffs into the lungs every 6 hours as needed for shortness of breath, wheezing or cough  Dispense: 18 g; Refill: 5    5. Immunization due  - RSV VACCINE (ABRYSVO)  - COVID-19 12+ (2023-24) (PFIZER)  - ZOSTER RECOMBINANT ADJUVANTED (SHINGRIX)        Patient has been advised of split billing requirements and indicates understanding: Yes    COUNSELING:  Reviewed preventive health counseling, as reflected in patient instructions       Regular exercise       Healthy diet/nutrition    She reports that she has never smoked. She has never been exposed to tobacco smoke. She has never used smokeless tobacco.        Goran Faustin MD  [facility]  Answers submitted by the patient for this visit:  Patient Health Questionnaire (Submitted on 12/14/2023)  If you checked off any problems, how difficult have these problems made it for you to do your work, take care of things at home, or get along with other people?: Not difficult at all  PHQ9 TOTAL SCORE: 0

## 2023-12-15 ENCOUNTER — LAB (OUTPATIENT)
Dept: LAB | Facility: CLINIC | Age: 63
End: 2023-12-15
Payer: COMMERCIAL

## 2023-12-15 DIAGNOSIS — Z00.00 ANNUAL PHYSICAL EXAM: ICD-10-CM

## 2023-12-15 LAB
ALBUMIN SERPL BCG-MCNC: 4 G/DL (ref 3.5–5.2)
ALP SERPL-CCNC: 65 U/L (ref 40–150)
ALT SERPL W P-5'-P-CCNC: 20 U/L (ref 0–50)
ANION GAP SERPL CALCULATED.3IONS-SCNC: 8 MMOL/L (ref 7–15)
AST SERPL W P-5'-P-CCNC: 23 U/L (ref 0–45)
BASOPHILS # BLD AUTO: 0.1 10E3/UL (ref 0–0.2)
BASOPHILS NFR BLD AUTO: 1 %
BILIRUB SERPL-MCNC: 0.6 MG/DL
BUN SERPL-MCNC: 10.2 MG/DL (ref 8–23)
CALCIUM SERPL-MCNC: 9.2 MG/DL (ref 8.8–10.2)
CHLORIDE SERPL-SCNC: 106 MMOL/L (ref 98–107)
CHOLEST SERPL-MCNC: 226 MG/DL
CREAT SERPL-MCNC: 0.8 MG/DL (ref 0.51–0.95)
DEPRECATED HCO3 PLAS-SCNC: 27 MMOL/L (ref 22–29)
EGFRCR SERPLBLD CKD-EPI 2021: 82 ML/MIN/1.73M2
EOSINOPHIL # BLD AUTO: 0.1 10E3/UL (ref 0–0.7)
EOSINOPHIL NFR BLD AUTO: 1 %
ERYTHROCYTE [DISTWIDTH] IN BLOOD BY AUTOMATED COUNT: 15.1 % (ref 10–15)
FASTING STATUS PATIENT QL REPORTED: ABNORMAL
GLUCOSE SERPL-MCNC: 88 MG/DL (ref 70–99)
HBA1C MFR BLD: 5.4 % (ref 0–5.6)
HCT VFR BLD AUTO: 36.1 % (ref 35–47)
HDLC SERPL-MCNC: 61 MG/DL
HGB BLD-MCNC: 12.3 G/DL (ref 11.7–15.7)
IMM GRANULOCYTES # BLD: 0 10E3/UL
IMM GRANULOCYTES NFR BLD: 0 %
LDLC SERPL CALC-MCNC: 144 MG/DL
LYMPHOCYTES # BLD AUTO: 1.7 10E3/UL (ref 0.8–5.3)
LYMPHOCYTES NFR BLD AUTO: 22 %
MCH RBC QN AUTO: 31.6 PG (ref 26.5–33)
MCHC RBC AUTO-ENTMCNC: 34.1 G/DL (ref 31.5–36.5)
MCV RBC AUTO: 93 FL (ref 78–100)
MONOCYTES # BLD AUTO: 0.6 10E3/UL (ref 0–1.3)
MONOCYTES NFR BLD AUTO: 7 %
NEUTROPHILS # BLD AUTO: 5.3 10E3/UL (ref 1.6–8.3)
NEUTROPHILS NFR BLD AUTO: 68 %
NONHDLC SERPL-MCNC: 165 MG/DL
PLATELET # BLD AUTO: 203 10E3/UL (ref 150–450)
POTASSIUM SERPL-SCNC: 4.3 MMOL/L (ref 3.4–5.3)
PROT SERPL-MCNC: 7 G/DL (ref 6.4–8.3)
RBC # BLD AUTO: 3.89 10E6/UL (ref 3.8–5.2)
SODIUM SERPL-SCNC: 141 MMOL/L (ref 135–145)
TRIGL SERPL-MCNC: 103 MG/DL
TSH SERPL DL<=0.005 MIU/L-ACNC: 1.41 UIU/ML (ref 0.3–4.2)
WBC # BLD AUTO: 7.7 10E3/UL (ref 4–11)

## 2023-12-15 PROCEDURE — 83036 HEMOGLOBIN GLYCOSYLATED A1C: CPT

## 2023-12-15 PROCEDURE — 85025 COMPLETE CBC W/AUTO DIFF WBC: CPT

## 2023-12-15 PROCEDURE — 36415 COLL VENOUS BLD VENIPUNCTURE: CPT

## 2023-12-15 PROCEDURE — 84443 ASSAY THYROID STIM HORMONE: CPT

## 2023-12-15 PROCEDURE — 80053 COMPREHEN METABOLIC PANEL: CPT

## 2023-12-15 PROCEDURE — 80061 LIPID PANEL: CPT

## 2023-12-29 ENCOUNTER — TRANSFERRED RECORDS (OUTPATIENT)
Dept: HEALTH INFORMATION MANAGEMENT | Facility: CLINIC | Age: 63
End: 2023-12-29

## 2023-12-29 ENCOUNTER — OFFICE VISIT (OUTPATIENT)
Dept: ALLERGY | Facility: CLINIC | Age: 63
End: 2023-12-29
Attending: FAMILY MEDICINE
Payer: COMMERCIAL

## 2023-12-29 VITALS — OXYGEN SATURATION: 99 % | BODY MASS INDEX: 30.61 KG/M2 | HEART RATE: 71 BPM | HEIGHT: 67 IN | WEIGHT: 195 LBS

## 2023-12-29 DIAGNOSIS — J30.1 SEASONAL ALLERGIC RHINITIS DUE TO POLLEN: ICD-10-CM

## 2023-12-29 DIAGNOSIS — J45.30 MILD PERSISTENT ASTHMA WITHOUT COMPLICATION: ICD-10-CM

## 2023-12-29 LAB
FEF 25/75: NORMAL
FEV-1: NORMAL
FEV1/FVC: NORMAL
FVC: NORMAL

## 2023-12-29 PROCEDURE — 94664 DEMO&/EVAL PT USE INHALER: CPT | Performed by: ALLERGY & IMMUNOLOGY

## 2023-12-29 PROCEDURE — 99244 OFF/OP CNSLTJ NEW/EST MOD 40: CPT | Mod: 25 | Performed by: ALLERGY & IMMUNOLOGY

## 2023-12-29 PROCEDURE — 95004 PERQ TESTS W/ALRGNC XTRCS: CPT | Performed by: ALLERGY & IMMUNOLOGY

## 2023-12-29 PROCEDURE — 94010 BREATHING CAPACITY TEST: CPT | Performed by: ALLERGY & IMMUNOLOGY

## 2023-12-29 RX ORDER — BUDESONIDE AND FORMOTEROL FUMARATE DIHYDRATE 160; 4.5 UG/1; UG/1
AEROSOL RESPIRATORY (INHALATION)
Qty: 20.4 G | Refills: 3 | Status: SHIPPED | OUTPATIENT
Start: 2023-12-29 | End: 2024-06-18

## 2023-12-29 NOTE — PROGRESS NOTES
"      Vicky HERNANDEZ is a 63 year old, presenting for the following health issues:  Allergy Consult (Knows has allergies, wants to know if has asthma)    HPI     Chief complaint: Asthma    History of present illness: This is a pleasant 63-year-old woman that I was asked to see for evaluation of allergies and asthma by Dr. Faustin.  Patient states that during the fall and spring she notes allergy symptoms of itchy eyes sneezing and drainage.  She takes Allegra and that does help.  She has noted, however, that she has a cough.  She states especially when she becomes sick the cough will linger for months.  She states it wakes her up from sleep.  She states this happens 3-4 times per week.  Albuterol provides temporary relief.  She has not been on a controller medication.  She has not been in the emergency room for breathing.  She is wondering if she may have asthma.  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  No previous allergy testing.  Does note some wheezing when she is sick as well.    Past medical history: Hypertension    Social history: No pets at home, former smoker when she was a teenager, lives in a home that has central air and a basement    Family history: Positive for asthma and allergies      Review of Systems   Constitutional, HEENT, cardiovascular, pulmonary, GI, , musculoskeletal, neuro, skin, endocrine and psych systems are negative, except as otherwise noted.      Objective    Pulse 71   Ht 1.689 m (5' 6.5\")   Wt 88.5 kg (195 lb)   SpO2 99%   BMI 31.00 kg/m    Body mass index is 31 kg/m .  Physical Exam   Gen: Pleasant female not in acute distress  HEENT: Eyes no erythema of the bulbar or palpebral conjunctiva, no edema. Ears: TMs well visualized, no effusions. Nose: No congestion, mucosa normal. Mouth: Throat clear, no lip or tongue edema.   Cardiac: Regular rate and rhythm, no murmurs, rubs or gallops  Respiratory: Clear to auscultation " bilaterally, no adventitious breath sounds  Lymph: No visible supraclavicular or cervical lymphadenopathy  Skin: No rashes or lesions  Psych: Alert and oriented times 3      At today s visit the patient/parent and I engaged in an informed consent discussion about allergy testing.  We discussed skin testing, blood testing,  and the alternative of not undergoing any testing. The patient/ parent has a preference for skin testing. We then discussed the risks and benefits of skin testing.  The patient/ parent understands skin testing risks can include, but are not limited to, urticaria, angioedema, shortness of breath, and severe anaphylaxis.  The benefits include, but are not limited, to evaluation for allergens causing symptoms.  After answering the patients/parents questions they have agreed to proceed with skin testing.    30 percutaneous test were placed to the environmental skin test panel.  Positive histamine control with positive test to tree pollen and weed pollen.  Please see scanned photograph.    Spirometry was performed.  Unable to obtain reproducible matches but her best attempt showed an FEV1 to FVC ratio of 89%.  FEV1 1.99 L or 88% of predicted.  FVC 2.24 L or 78% of predicted.  However, the patient had a difficult time with this spirometry and has wheezing and shortness of breath as well as cough occur.    Impression report and plan:  1.  Mild persistent asthma  2.  Allergic rhinitis    Recommend Symbicort 160/4.52 puffs twice daily and then as needed up to 12 puffs daily max according to the 2021 Sheila guidelines.  AeroChamber provided and I went over how to use this.  I would like the patient to follow-up in 3 months.  Recommended Allegra during the spring and fall.  If she is not able to obtain Symbicort recommend Advair Dulera and using albuterol as a rescue.    Impression report and plan:  Allergic rhinitis

## 2023-12-29 NOTE — PATIENT INSTRUCTIONS
Symbicort 2 puffs twice daily     Allegra (spring and fall)    Can use Symbicort up to 12 puffs daily max    (Advair/Dulera---if using these--albuterol as rescue)

## 2023-12-29 NOTE — LETTER
"    12/29/2023         RE: David Franco  [address]  [facility] [address]        Dear Colleague,    Thank you for referring your patient, David Franco, to the [facility]. Please see a copy of my visit note below.          Subjective  DAVID is a 63 year old, presenting for the following health issues:  Allergy Consult (Knows has allergies, wants to know if has asthma)    HPI     Chief complaint: Asthma    History of present illness: This is a pleasant 63-year-old woman that I was asked to see for evaluation of allergies and asthma by Dr. Faustin.  Patient states that during the fall and spring she notes allergy symptoms of itchy eyes sneezing and drainage.  She takes Allegra and that does help.  She has noted, however, that she has a cough.  She states especially when she becomes sick the cough will linger for months.  She states it wakes her up from sleep.  She states this happens 3-4 times per week.  Albuterol provides temporary relief.  She has not been on a controller medication.  She has not been in the emergency room for breathing.  She is wondering if she may have asthma.  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  No previous allergy testing.  Does note some wheezing when she is sick as well.    Past medical history: Hypertension    Social history: No pets at home, former smoker when she was a teenager, lives in a home that has central air and a basement    Family history: Positive for asthma and allergies      Review of Systems   Constitutional, HEENT, cardiovascular, pulmonary, GI, , musculoskeletal, neuro, skin, endocrine and psych systems are negative, except as otherwise noted.      Objective   Pulse 71   Ht 1.689 m (5' 6.5\")   Wt 88.5 kg (195 lb)   SpO2 99%   BMI 31.00 kg/m    Body mass index is 31 kg/m .  Physical Exam   Gen: Pleasant female not in acute distress  HEENT: Eyes no erythema of the " bulbar or palpebral conjunctiva, no edema. Ears: TMs well visualized, no effusions. Nose: No congestion, mucosa normal. Mouth: Throat clear, no lip or tongue edema.   Cardiac: Regular rate and rhythm, no murmurs, rubs or gallops  Respiratory: Clear to auscultation bilaterally, no adventitious breath sounds  Lymph: No visible supraclavicular or cervical lymphadenopathy  Skin: No rashes or lesions  Psych: Alert and oriented times 3      At today s visit the patient/parent and I engaged in an informed consent discussion about allergy testing.  We discussed skin testing, blood testing,  and the alternative of not undergoing any testing. The patient/ parent has a preference for skin testing. We then discussed the risks and benefits of skin testing.  The patient/ parent understands skin testing risks can include, but are not limited to, urticaria, angioedema, shortness of breath, and severe anaphylaxis.  The benefits include, but are not limited, to evaluation for allergens causing symptoms.  After answering the patients/parents questions they have agreed to proceed with skin testing.    30 percutaneous test were placed to the environmental skin test panel.  Positive histamine control with positive test to tree pollen and weed pollen.  Please see scanned photograph.    Spirometry was performed.  Unable to obtain reproducible matches but her best attempt showed an FEV1 to FVC ratio of 89%.  FEV1 1.99 L or 88% of predicted.  FVC 2.24 L or 78% of predicted.  However, the patient had a difficult time with this spirometry and has wheezing and shortness of breath as well as cough occur.    Impression report and plan:  1.  Mild persistent asthma  2.  Allergic rhinitis    Recommend Symbicort 160/4.52 puffs twice daily and then as needed up to 12 puffs daily max according to the 2021 Sheila guidelines.  AeroChamber provided and I went over how to use this.  I would like the patient to follow-up in 3 months.  Recommended Allegra  during the spring and fall.  If she is not able to obtain Symbicort recommend Advair Dulera and using albuterol as a rescue.    Impression report and plan:  Allergic rhinitis            Again, thank you for allowing me to participate in the care of your patient.        Sincerely,        Jeni COOPER MD

## 2024-04-29 ENCOUNTER — OFFICE VISIT (OUTPATIENT)
Dept: FAMILY MEDICINE | Facility: CLINIC | Age: 64
End: 2024-04-29
Payer: COMMERCIAL

## 2024-04-29 VITALS
OXYGEN SATURATION: 96 % | DIASTOLIC BLOOD PRESSURE: 94 MMHG | SYSTOLIC BLOOD PRESSURE: 170 MMHG | HEIGHT: 67 IN | HEART RATE: 66 BPM | RESPIRATION RATE: 20 BRPM | WEIGHT: 192 LBS | TEMPERATURE: 98 F | BODY MASS INDEX: 30.13 KG/M2

## 2024-04-29 DIAGNOSIS — I10 ESSENTIAL HYPERTENSION, BENIGN: Primary | ICD-10-CM

## 2024-04-29 DIAGNOSIS — M72.2 PLANTAR FASCIITIS OF RIGHT FOOT: ICD-10-CM

## 2024-04-29 DIAGNOSIS — J45.20 MILD INTERMITTENT ASTHMA WITHOUT COMPLICATION: ICD-10-CM

## 2024-04-29 PROCEDURE — 99214 OFFICE O/P EST MOD 30 MIN: CPT | Performed by: FAMILY MEDICINE

## 2024-04-29 PROCEDURE — G2211 COMPLEX E/M VISIT ADD ON: HCPCS | Performed by: FAMILY MEDICINE

## 2024-04-29 RX ORDER — LOSARTAN POTASSIUM 100 MG/1
100 TABLET ORAL DAILY
Qty: 90 TABLET | Refills: 3 | Status: SHIPPED | OUTPATIENT
Start: 2024-04-29 | End: 2024-05-16

## 2024-04-29 ASSESSMENT — PAIN SCALES - GENERAL: PAINLEVEL: EXTREME PAIN (8)

## 2024-04-29 NOTE — PROGRESS NOTES
"  Assessment & Plan     Essential hypertension, benign  Uncontrolled.  Will switch patient from lisinopril 40 mg daily to losartan 100 mg daily to see if that helps with cough.  - losartan (COZAAR) 100 MG tablet; Take 1 tablet (100 mg) by mouth daily    Mild intermittent asthma without complication  Controlled.  Doing well with Symbicort.    Plantar fasciitis of right foot  Uncontrolled.  Referral to physical therapy.  We have given patient a handicap placard for about 13 to 14 months to give her time to recover and improve from plantar fasciitis.  - Physical Therapy  Referral; Future        The longitudinal plan of care for the diagnosis(es)/condition(s) as documented were addressed during this visit. Due to the added complexity in care, I will continue to support DAVID in the subsequent management and with ongoing continuity of care.      BMI  Estimated body mass index is 30.53 kg/m  as calculated from the following:    Height as of this encounter: 1.689 m (5' 6.5\").    Weight as of this encounter: 87.1 kg (192 lb).   Weight management plan: Discussed healthy diet and exercise guidelines          Subjective   DAVID is a 63 year old, presenting for the following health issues:  Recheck Medication (Concerns about lisinopril, choking/difficulty breathing ), Plantar Fascitis (Right leg pain, ankle, hurts to walk), Forms (Parking disability forms to be completed - requesting for leg pain), and Hypertension (Has been getting readings at home of >160/>90)        4/29/2024     2:41 PM   Additional Questions   Roomed by [facility], Visit Facilitator     History of Present Illness       Reason for visit:  Plantar fascitis med check parking certificate      She works at a .    Hypertension: Patient is taking lisinopril 40 mg daily and amlodipine 5 mg daily.  She is complaining of cough that she believes is coming from lisinopril use.  She is interested in changing her medication.  Patient did take NSAID " "yesterday, which does spike her blood pressure.  Usually her blood pressure is 130s 120s to 130s over 70s to 80s.    Plantar fasciitis: She has history of ankle fusion and now has chronic plantar fasciitis. She has pain with prolonged walking. She is on her feet at work at . She is wearing an insert in her shoe.    Asthma: Patient is being followed by allergist and is not on Symbicort.  She states that her wheezing has significantly improved.                  Review of Systems  Constitutional, HEENT, cardiovascular, pulmonary, gi and gu systems are negative, except as otherwise noted.      Objective    BP (!) 199/107 (BP Location: Left arm, Patient Position: Sitting, Cuff Size: Adult Regular)   Pulse 66   Temp 98  F (36.7  C) (Oral)   Resp 20   Ht 1.689 m (5' 6.5\")   Wt 87.1 kg (192 lb)   SpO2 96%   BMI 30.53 kg/m    Body mass index is 30.53 kg/m .  Physical Exam   GENERAL: alert and no distress  RESP: lungs clear to auscultation - no rales, rhonchi or wheezes  CV: regular rate and rhythm, normal S1 S2, no S3 or S4, no murmur, click or rub, no peripheral edema   PSYCH: mentation appears normal, affect normal/bright            Signed Electronically by: Goran Faustin MD    "

## 2024-04-30 ENCOUNTER — TELEPHONE (OUTPATIENT)
Dept: FAMILY MEDICINE | Facility: CLINIC | Age: 64
End: 2024-04-30
Payer: COMMERCIAL

## 2024-04-30 NOTE — TELEPHONE ENCOUNTER
Forms/Letter Request    Type of form/letter: DMV/Handicap Parking      Do we have the form/letter: Yes: Patient brought in. In a CMT folder.    Who is the form from? Patient    Where did/will the form come from? Patient or family brought in       When is form/letter needed by: ASAP    How would you like the form/letter returned:     Patient Notified form requests are processed in 5-7 business days:Yes    Could we send this information to you in [employer] or would you prefer to receive a phone call?:   Patient would prefer a phone call   Okay to leave a detailed message?: Yes at Cell number on file:    Telephone Information:   Mobile [phone]

## 2024-05-02 NOTE — TELEPHONE ENCOUNTER
Completed question about not needing adaptive equipment that I did not jami initially.    Goran Faustin MD

## 2024-05-02 NOTE — TELEPHONE ENCOUNTER
LM that forms have been completed and are ready for pick-up at .    [facility], May 2, 2024, 9:09 AM

## 2024-05-14 ENCOUNTER — HOSPITAL ENCOUNTER (EMERGENCY)
Facility: CLINIC | Age: 64
Discharge: HOME | End: 2024-05-15
Attending: STUDENT IN AN ORGANIZED HEALTH CARE EDUCATION/TRAINING PROGRAM | Admitting: STUDENT IN AN ORGANIZED HEALTH CARE EDUCATION/TRAINING PROGRAM
Payer: COMMERCIAL

## 2024-05-14 VITALS
TEMPERATURE: 98.7 F | SYSTOLIC BLOOD PRESSURE: 203 MMHG | OXYGEN SATURATION: 98 % | BODY MASS INDEX: 30.61 KG/M2 | WEIGHT: 195 LBS | DIASTOLIC BLOOD PRESSURE: 101 MMHG | HEIGHT: 67 IN | RESPIRATION RATE: 18 BRPM | HEART RATE: 85 BPM

## 2024-05-14 DIAGNOSIS — R21 RASH: ICD-10-CM

## 2024-05-14 DIAGNOSIS — L50.8 CHRONIC URTICARIA: ICD-10-CM

## 2024-05-14 PROCEDURE — 99284 EMERGENCY DEPT VISIT MOD MDM: CPT

## 2024-05-14 PROCEDURE — 250N000012 HC RX MED GY IP 250 OP 636 PS 637: Performed by: STUDENT IN AN ORGANIZED HEALTH CARE EDUCATION/TRAINING PROGRAM

## 2024-05-14 RX ORDER — HYDROXYZINE HYDROCHLORIDE 25 MG/1
25 TABLET, FILM COATED ORAL EVERY 8 HOURS PRN
Qty: 30 TABLET | Refills: 0 | Status: SHIPPED | OUTPATIENT
Start: 2024-05-14

## 2024-05-14 RX ORDER — PREDNISONE 20 MG/1
60 TABLET ORAL ONCE
Status: COMPLETED | OUTPATIENT
Start: 2024-05-14 | End: 2024-05-14

## 2024-05-14 RX ORDER — PREDNISONE 20 MG/1
TABLET ORAL
Qty: 9 TABLET | Refills: 0 | Status: SHIPPED | OUTPATIENT
Start: 2024-05-14 | End: 2024-05-24

## 2024-05-14 RX ADMIN — PREDNISONE 60 MG: 20 TABLET ORAL at 23:14

## 2024-05-14 ASSESSMENT — COLUMBIA-SUICIDE SEVERITY RATING SCALE - C-SSRS
6. HAVE YOU EVER DONE ANYTHING, STARTED TO DO ANYTHING, OR PREPARED TO DO ANYTHING TO END YOUR LIFE?: NO
1. IN THE PAST MONTH, HAVE YOU WISHED YOU WERE DEAD OR WISHED YOU COULD GO TO SLEEP AND NOT WAKE UP?: NO
2. HAVE YOU ACTUALLY HAD ANY THOUGHTS OF KILLING YOURSELF IN THE PAST MONTH?: NO

## 2024-05-15 ASSESSMENT — ACTIVITIES OF DAILY LIVING (ADL)
ADLS_ACUITY_SCORE: 33
ADLS_ACUITY_SCORE: 33

## 2024-05-15 NOTE — ED TRIAGE NOTES
Pt is coming in coming in tonight with hives all over her body that started on Sunday. She has been taking benadryl for the hives and itching but it is not working now. No SOB or throat swelling. PT started taking a new BP medication about a week ago. She stopped taking the med on Saturday thinking it was causing the hives but has had no improvement.      New medication: amlodipine 5mg  Last dose of Benadryl at 1730 25mg      Triage Assessment (Adult)       Row Name 05/14/24 2027          Triage Assessment    Airway WDL WDL        Respiratory WDL    Respiratory WDL WDL        Skin Circulation/Temperature WDL    Skin Circulation/Temperature WDL WDL        Cardiac WDL    Cardiac WDL WDL        Peripheral/Neurovascular WDL    Peripheral Neurovascular WDL WDL        Cognitive/Neuro/Behavioral WDL    Cognitive/Neuro/Behavioral WDL WDL

## 2024-05-15 NOTE — DISCHARGE INSTRUCTIONS
Your rash could be consistent with allergic type reaction.  In addition to Benadryl you may also try hydroxyzine.  You have also been given a taper of medicine called prednisone which is a steroid which can help with itching.  If this rash persist beyond the next week I would recommend following with her primary care doctor or dermatologist.  Additionally if you begin to have fevers, pus coming from the rash, eye pain or other concerning symptom please return to the emergency department for repeat evaluation.

## 2024-05-15 NOTE — ED PROVIDER NOTES
EMERGENCY DEPARTMENT ENCOUNTER      NAME: Vanessa Franco  AGE: 63 year old female  YOB: 1960  MRN: [id number]  EVALUATION DATE & TIME: No admission date for patient encounter.    PCP: Goran Faustin    ED PROVIDER: Gagandeep Mcdonald MD      Chief Complaint   Patient presents with    Hives         FINAL IMPRESSION:  1. Rash    2. Chronic urticaria          ED COURSE & MEDICAL DECISION MAKING:    Pertinent Labs & Imaging studies reviewed. (See chart for details)  63 year old female presents to the Emergency Department for evaluation of rash.    ED Course as of 05/15/24 0109   Tue May 14, 2024   2316 Patient is a 63-year-old female presenting to the emergency department for evaluation of itchy skin rash.  Patient began taking amlodipine about a week and a half ago and on Sunday began having itchy rash on her upper arms.  She has been taking Benadryl which has helped a little bit with the rash but the rash has persisted.  With onset of rash denies any nausea or vomiting.  No difficulty breathing.  Denies any lightheadedness or dizziness.  Has a known allergy to sulfa medications.    Examination here patient does have urticarial appearing rash on bilateral upper extremities as well as the back and trunk.  The rash does yusuf and few areas has some raised papules.  No purpura, no petechia, no bullae, no vesicles.  No oral mucosal involvement and no conjunctival injection.  She is afebrile.    Unclear etiology of rash.  Could be potentially allergic in etiology but fortunately no signs of anaphylaxis.  She is afebrile and I do not think this is an infectious etiology.  No oral mucosal involvement or eye involvement and do not suspect Campos-Jim or TEN.  No purpura petechiae and think she is unlikely to benefit from emergent labs at this point in time.  Given significantly itchy nature of the rash she could benefit from a short course of steroids.  Given first dose of steroids here in the emergency  department and will be sent home with a 10-day taper.  Recommend ongoing Tylenol as well as hydroxyzine as needed for itching relief.  If her symptoms persist I do recommend close follow-up with primary care doctor and she may benefit from dermatology evaluation as well.  Otherwise if she develops fevers, eye involvement or eye pain, involvement of the lips or oral mucosa or other concerning symptoms she is encouraged to return to the emergency department for repeat evaluation.       Medical Decision Making  Obtained supplemental history:Supplemental history obtained?: Documented in chart  Reviewed external records: External records reviewed?: Documented in chart  Care impacted by chronic illness:Chronic Lung Disease, Hypertension, and Other: Chronic urticaria  Care significantly affected by social determinants of health:Access to Affordable Health Care  Did you consider but not order tests?: Work up considered but not performed and documented in chart, if applicable  Did you interpret images independently?: Independent interpretation of ECG and images noted in documentation, when applicable.  Consultation discussion with other provider:Did you involve another provider (consultant, MH, pharmacy, etc.)?: No  Discharge. I prescribed additional prescription strength medication(s) as charted. See documentation for any additional details.          At the conclusion of the encounter I discussed the results of all of the tests and the disposition. The questions were answered. The patient or family acknowledged understanding and was agreeable with the care plan.     0 minutes of critical care time     MEDICATIONS GIVEN IN THE EMERGENCY:  Medications   predniSONE (DELTASONE) tablet 60 mg (60 mg Oral $Given 5/14/24 5460)       NEW PRESCRIPTIONS STARTED AT TODAY'S ER VISIT  New Prescriptions    HYDROXYZINE HCL (ATARAX) 25 MG TABLET    Take 1 tablet (25 mg) by mouth every 8 hours as needed for itching    PREDNISONE (DELTASONE)  "20 MG TABLET    2 tabs day 1-2, then 1 tab days 3-4, then 1/2 tab daily for 6 days          =================================================================    HPI    Patient information was obtained from: Patient    Use of : N/A        Vanessa GILMER Franco is a 63 year old female with a pertinent history of HTN, chronic urticaria, and asthma, who presents to this ED by private car for evaluation of hives. Patient began taking amlodipine about a week and a half ago and on  began having itchy rash on her upper arms.  She has been taking Benadryl which has helped a little bit with the rash but the rash has persisted.  With onset of rash denies any nausea or vomiting.  No difficulty breathing.  Denies any lightheadedness or dizziness.  Has a known allergy to sulfa medications.      REVIEW OF SYSTEMS   Refer to the [facility]    PAST MEDICAL HISTORY:  Past Medical History:   Diagnosis Date    Anxiety     Chronic urticaria 2020    Environmental allergies     History of cataract surgery     no anestesia issue - BL removed    Hypertension     Mild major depression (H) 2021    no more depression after stopping atenolol       PAST SURGICAL HISTORY:  Past Surgical History:   Procedure Laterality Date    ANKLE FRACTURE SURGERY      BREAST SURGERY       SECTION      HYSTERECTOMY, PAP NO LONGER INDICATED      \"She does not need to have Pap smear\"    MAMMOPLASTY REDUCTION Bilateral 1993           CURRENT MEDICATIONS:    hydrOXYzine HCl (ATARAX) 25 MG tablet  predniSONE (DELTASONE) 20 MG tablet  albuterol (PROAIR HFA/PROVENTIL HFA/VENTOLIN HFA) 108 (90 Base) MCG/ACT inhaler  amLODIPine (NORVASC) 5 MG tablet  budesonide-formoterol (SYMBICORT) 160-4.5 MCG/ACT Inhaler  cholecalciferol, vitamin D3, 1,000 unit (25 mcg) tablet  fexofenadine (ALLEGRA) 180 MG tablet  fish oil-omega-3 fatty acids 1000 MG capsule  FLUoxetine (PROZAC) 10 MG capsule  losartan (COZAAR) 100 MG tablet  magnesium 250 MG " tablet  vitamin B complex with vitamin C (VITAMIN  B COMPLEX) tablet        ALLERGIES:  Allergies   Allergen Reactions    Morphine Hives    Sulfa (Sulfonamide Antibiotics) [Sulfa Antibiotics] Hives       FAMILY HISTORY:  Family History   Problem Relation Age of Onset    Dementia Mother     Breast Cancer Mother     Lung Cancer Father        SOCIAL HISTORY:   Social History     Socioeconomic History    Marital status:    Tobacco Use    Smoking status: Never     Passive exposure: Never    Smokeless tobacco: Never   Vaping Use    Vaping status: Never Used   Substance and Sexual Activity    Alcohol use: Yes     Alcohol/week: 1.0 standard drink of alcohol     Comment: Alcoholic Drinks/day: Yearly    Drug use: No     Social Determinants of Health     Financial Resource Strain: Low Risk  (12/14/2023)    Financial Resource Strain     Within the past 12 months, have you or your family members you live with been unable to get utilities (heat, electricity) when it was really needed?: No   Food Insecurity: Low Risk  (12/14/2023)    Food Insecurity     Within the past 12 months, did you worry that your food would run out before you got money to buy more?: No     Within the past 12 months, did the food you bought just not last and you didn t have money to get more?: No   Transportation Needs: Low Risk  (12/14/2023)    Transportation Needs     Within the past 12 months, has lack of transportation kept you from medical appointments, getting your medicines, non-medical meetings or appointments, work, or from getting things that you need?: No   Interpersonal Safety: Low Risk  (12/14/2023)    Interpersonal Safety     Do you feel physically and emotionally safe where you currently live?: Yes     Within the past 12 months, have you been hit, slapped, kicked or otherwise physically hurt by someone?: No     Within the past 12 months, have you been humiliated or emotionally abused in other ways by your partner or ex-partner?: No  "  Housing Stability: Low Risk  (12/14/2023)    Housing Stability     Do you have housing? : Yes     Are you worried about losing your housing?: No       VITALS:  BP (!) 203/101   Pulse 85   Temp 98.7  F (37.1  C) (Oral)   Resp 18   Ht 1.689 m (5' 6.5\")   Wt 88.5 kg (195 lb)   SpO2 98%   BMI 31.00 kg/m      PHYSICAL EXAM    Constitutional: Well developed, Well nourished, NAD,  HENT: Normocephalic, no peeling of the lips, no conjunctival injection, no oral mucosal involvement  Neck- trachea midline, No stridor.    Eyes:EOMI, Conjunctiva normal, No discharge.   Respiratory: Normal breath sounds, No respiratory distress, No wheezing.   Cardiovascular: Normal heart rate, Regular rhythm,  No murmurs,   Abdominal: Soft, No tenderness, No rebound or guarding.     Musculoskeletal: no deformity or malalignment   Integument: Somewhat diffuse blanching maculopapular rash to the upper extremities involving the trunk and back as well, no bullae, vesicles, petechia purpura appreciated  Neurologic: Alert & oriented x 3   Psychiatric: Affect normal, Cooperative.      LAB:  All pertinent labs reviewed and interpreted.       RADIOLOGY:  Reviewed all pertinent imaging. Please see official radiology report.  No orders to display       EKG:        PROCEDURES:         [employer] System Documentation:   CMS Diagnoses:            Gagandeep Mcdonald MD  [facility] EMERGENCY ROOM  [facility] [facility] [id number]  [phone]      Gagandeep Mcdonald MD  05/15/24 0109    "

## 2024-05-16 ENCOUNTER — NURSE TRIAGE (OUTPATIENT)
Dept: NURSING | Facility: CLINIC | Age: 64
End: 2024-05-16
Payer: COMMERCIAL

## 2024-05-16 DIAGNOSIS — I10 ESSENTIAL HYPERTENSION, BENIGN: Primary | ICD-10-CM

## 2024-05-16 RX ORDER — LISINOPRIL 40 MG/1
40 TABLET ORAL DAILY
Qty: 90 TABLET | Refills: 3 | Status: SHIPPED | OUTPATIENT
Start: 2024-05-16

## 2024-05-16 NOTE — TELEPHONE ENCOUNTER
Went to  in [place]. Had breakout of hives and itching while there. Went to the ER in [place] [facility] because it was her whole body. Given steroids and higher potency of benadryl. Supposed to call MD with trouble of b/p. She stopped taking the b/p med because maybe it was what caused the breakout. B/p reading was 200/something Tuesday night, on medication. Hives keep coming back. Goes down for 1-2 hours then they come right back. She is still on steroids. Started them on Tuesday.  170/110 while on phone today. Please call her at:  [phone].  May leave a detailed message.  Nicolette Lester RN  [facility]    Nurse Triage SBAR    Is this a 2nd Level Triage? YES, LICENSED PRACTITIONER REVIEW IS REQUIRED    Situation: hypertension. Most recently 170/110 while on call this morning. Stopped taking b/p med because it was new and she thought that is what caused her hives that landed her in the ER out of town.    Background: Went to  in [facility]. Had breakout of hives and itching while there. Went to the ER in [place] because it was her whole body. Given steroids and higher potency of benadryl. Supposed to call MD with trouble of b/p. She stopped taking the b/p med because maybe it was what caused the breakout. B/p reading was 200/something Tuesday night, on medication. Hives keep coming back. Goes down for 1-2 hours then they come right back. She is still on steroids. Started them on Tuesday.  170/110.      Assessment: hives and hypertension    Protocol Recommended Disposition:   Discuss With PCP And Callback By Nurse Within 1 Hour    Recommendation: needs guidance on b/p medication and further evaluation of hives. Triage results in discuss with PCP and call back by RN within one hour. She did not take the blood pressure med this morning. She last had it on Tuesday when she landed in the ER.     Routed to provider    Does the patient meet one of the following criteria for ADS visit consideration?  16+ years old, with an [facility] PCP     TIP  Providers, please consider if this condition is appropriate for management at one of our Acute and Diagnostic Services sites.     If patient is a good candidate, please use dotphrase <dot>triageresponse and select Refer to ADS to document.    Nicolette Lester RN  [facility]    Reason for Disposition   Systolic BP >= 180 OR Diastolic >= 110, and missed most recent dose of blood pressure medication    Additional Information   Negative: Sounds like a life-threatening emergency to the triager   Negative: Symptom is main concern (e.g., headache, chest pain)   Negative: Low blood pressure is main concern   Negative: Systolic BP >= 160 OR Diastolic >= 100, and any cardiac (e.g., breathing difficulty, chest pain) or neurologic symptoms (e.g., new-onset blurred or double vision)   Negative: Pregnant 20 or more weeks (or postpartum < 6 weeks) with new hand or face swelling   Negative: Pregnant 20 or more weeks (or postpartum < 6 weeks) and Systolic BP >= 160 OR Diastolic >= 110   Negative: Patient sounds very sick or weak to the triager   Negative: Systolic BP >= 200 OR Diastolic >= 120 and having NO cardiac or neurologic symptoms   Negative: Pregnant 20 or more weeks (or postpartum < 6 weeks) with Systolic BP >= 140 OR Diastolic >= 90    Protocols used: Blood Pressure - High-A-[place]

## 2024-05-16 NOTE — TELEPHONE ENCOUNTER
Provider Response to 2nd Level Triage Request    I have reviewed the RN documentation. My recommendation is:  We switched lisinopril to losartan about 2.5 weeks ago, which might be the reason for the hives. We could switch her back to lisinopril and have her continue taking amlodipine. Please let me know what pharmacy we should send lisinopril prescription.

## 2024-05-16 NOTE — TELEPHONE ENCOUNTER
Patient called, she would like it sent to [facility] in [place]. Pharmacy selected, please pend new med.

## 2024-05-16 NOTE — TELEPHONE ENCOUNTER
Called pt to let her know medication was prescribed. No further questions. Pt agreeable to plan of care.     Joanna JETER RN

## 2024-05-16 NOTE — TELEPHONE ENCOUNTER
Writer left detailed voicemail for pt. Requested call back to notify which pharmacy pt would like their lisinopril prescription sent to.     Lara Arauz RN

## 2024-06-07 ENCOUNTER — NURSE TRIAGE (OUTPATIENT)
Dept: NURSING | Facility: CLINIC | Age: 64
End: 2024-06-07
Payer: COMMERCIAL

## 2024-06-07 NOTE — TELEPHONE ENCOUNTER
Took a med she's allergic to.  Having hives. Accidentally took the wrong one. It was in her pill box. It's losartan. I told her I would connect her with Poison Control. I told her it's a 100 mg tablet and she told me she took some benadryl. I told her that's a good first step. I transferred her to Poison Control.  Nicolette Lester RN  [facility]    Reason for Disposition    Widespread hives, itching, or facial swelling and onset < 2 hours of exposure to high-risk allergen (e.g., 1st dose of antibiotic, nuts, sting)    Protocols used: Hives-A-[place]

## 2024-06-10 NOTE — PROGRESS NOTES
"PHYSICAL THERAPY EVALUATION  Type of Visit: Evaluation    See electronic medical record for Abuse and Falls Screening details.    Subjective       Presenting condition or subjective complaint:    Date of onset: 04/29/24 (order date due to chronicity of problem)    Relevant medical history:     Dates & types of surgery:      Prior diagnostic imaging/testing results:       Prior therapy history for the same diagnosis, illness or injury:        Prior Level of Function  Transfers:   Ambulation:   ADL:   IADL:     Living Environment  Social support:     Type of home:     Stairs to enter the home:         Ramp:     Stairs inside the home:         Help at home:    Equipment owned:       Employment:      Hobbies/Interests:      Patient goals for therapy:   \"walk without pain, exercise, work\"  Pt works as a  provider    Pain assessment: See objective evaluation for additional pain details    Pt reports a right ankle fracture in 2001/2 not sure exact time. She had 4-5 surgeries and injections throughout that time. She did end up getting a fusion about 10 years later. Pain has been worse recently as she started to try to do more activity. It is worse with standing, walking, bending, lifting. The more activity the worse it gets. She does wear a compression sock daily.     Pain: R lateral ankle into [place] lower legs, R plantar fascia     Objective   FOOT/ANKLE EVALUATION  PAIN: Pain Level at Rest: 4/10  Pain Level with Use: 10/10  Pain Location: ankle  Pain Quality: Sharp  Pain Progression: Worsened  INTEGUMENTARY (edema, incisions):  no swelling noted today but pt reports it increases throughout the day  POSTURE:   GAIT:   Weightbearing Status: WBAT  Assistive Device(s): Cane (single end)- does not use consistently  Gait Deviations: Pronation increased R, Push off decreased R  BALANCE/PROPRIOCEPTION:   WEIGHT BEARING ALIGNMENT:   NON-WEIGHTBEARING ALIGNMENT:    ROM:   (Degrees) Left AROM Left PROM  Right AROM Right PROM "   Ankle Dorsiflexion 5 deg from neutral  2 deg from neutral    Ankle Plantarflexion 45 deg  4 deg    Ankle Inversion   stiff    Ankle Eversion   stiff    Great Toe Flexion       Great Toe Extension       Pain:   End feel:     STRENGTH:   Pain: - none + mild ++ moderate +++ severe  Strength Scale: 0-5/5 Left Right   Ankle Dorsiflexion 5 4+   Ankle Plantarflexion  unable to formally assess due to ankle fusion but pain with isometric pressure   Ankle Inversion 5 4   Ankle Eversion 5 4   Great Toe Flexion     Great Toe Extension     Anterior Tibialis     Posterior Tibialis     Peroneals     Extensor Digitorum     Gastroc/Soleus       FLEXIBILITY:   SPECIAL TESTS:   FUNCTIONAL TESTS:   PALPATION:  tender along anterior ankle, plantar fascia, achilles tendon, and gastroc muscle belly all on right  JOINT MOBILITY:  stiffness as expected due to R ankle fusion    Assessment & Plan   CLINICAL IMPRESSIONS  Medical Diagnosis: Plantar fasciitis of right foot    Treatment Diagnosis: Plantar fasciitis of right foot, chronic right ankle pain, right ankle stiffness   Impression/Assessment: Patient is a 64 year old female with chronic right ankle pain with hx of an ankle fusion >10 years ago.  The following significant findings have been identified: Pain, Decreased ROM/flexibility, Decreased joint mobility, Decreased strength, Impaired balance, Decreased proprioception, Impaired gait, and Decreased activity tolerance. These impairments interfere with their ability to perform self care tasks, work tasks, recreational activities, household chores, driving , and community mobility as compared to previous level of function.     Clinical Decision Making (Complexity):  Clinical Presentation: Stable/Uncomplicated  Clinical Presentation Rationale: based on medical and personal factors listed in PT evaluation  Clinical Decision Making (Complexity): Low complexity    PLAN OF CARE  Treatment Interventions:  Modalities: Cryotherapy, Hot  Pack  Interventions: Gait Training, Manual Therapy, Neuromuscular Re-education, Therapeutic Activity, Therapeutic Exercise, Self-Care/Home Management    Long Term Goals     PT Goal 1  Goal Identifier: Walking  Goal Description: Pt will be able to walk for 20 min with <4/10 pain to improve her aerobic endurance and community mobility  Target Date: 09/03/24  PT Goal 2  Goal Identifier: Work  Goal Description: Pt will be able to stand and lift throughout the workday with <4/10 pain to improve her QOL  Target Date: 09/03/24      Frequency of Treatment: 1x/week  Duration of Treatment: 12 weeks    Recommended Referrals to Other Professionals:   Education Assessment:   Learner/Method: Patient;Pictures/Video    Risks and benefits of evaluation/treatment have been explained.   Patient/Family/caregiver agrees with Plan of Care.     Evaluation Time:     PT Eval, Low Complexity Minutes (31076): 17       Signing Clinician: Vesta Park, PT      [facility]                                                                                   OUTPATIENT PHYSICAL THERAPY      PLAN OF TREATMENT FOR OUTPATIENT REHABILITATION   Patient's Last Name, First Name, Vanessa Dotson YOB: 1960   Provider's Name   [facility]   Medical Record No.  [id number]     Onset Date: 04/29/24 (order date due to chronicity of problem)  Start of Care Date: 06/11/24     Medical Diagnosis:  Plantar fasciitis of right foot      PT Treatment Diagnosis:  Plantar fasciitis of right foot, chronic right ankle pain, right ankle stiffness Plan of Treatment  Frequency/Duration: 1x/week/ 12 weeks    Certification date from 06/11/24 to 09/03/24         See note for plan of treatment details and functional goals     Vesta Park, PT                         I CERTIFY THE NEED FOR THESE SERVICES FURNISHED UNDER        THIS PLAN OF TREATMENT AND WHILE UNDER MY CARE     (Physician  attestation of this document indicates review and certification of the therapy plan).              Referring Provider:  Goran Faustin    Initial Assessment  See Epic Evaluation- Start of Care Date: 06/11/24

## 2024-06-11 ENCOUNTER — THERAPY VISIT (OUTPATIENT)
Dept: PHYSICAL THERAPY | Facility: REHABILITATION | Age: 64
End: 2024-06-11
Attending: FAMILY MEDICINE
Payer: COMMERCIAL

## 2024-06-11 DIAGNOSIS — M25.671 ANKLE STIFFNESS, RIGHT: Primary | ICD-10-CM

## 2024-06-11 DIAGNOSIS — M72.2 PLANTAR FASCIITIS OF RIGHT FOOT: ICD-10-CM

## 2024-06-11 DIAGNOSIS — M25.571 CHRONIC PAIN OF RIGHT ANKLE: ICD-10-CM

## 2024-06-11 DIAGNOSIS — G89.29 CHRONIC PAIN OF RIGHT ANKLE: ICD-10-CM

## 2024-06-11 PROCEDURE — 97535 SELF CARE MNGMENT TRAINING: CPT | Mod: GP | Performed by: PHYSICAL THERAPIST

## 2024-06-11 PROCEDURE — 97110 THERAPEUTIC EXERCISES: CPT | Mod: GP | Performed by: PHYSICAL THERAPIST

## 2024-06-11 PROCEDURE — 97161 PT EVAL LOW COMPLEX 20 MIN: CPT | Mod: GP | Performed by: PHYSICAL THERAPIST

## 2024-06-18 ENCOUNTER — TELEPHONE (OUTPATIENT)
Dept: ALLERGY | Facility: CLINIC | Age: 64
End: 2024-06-18

## 2024-06-18 ENCOUNTER — VIRTUAL VISIT (OUTPATIENT)
Dept: ALLERGY | Facility: CLINIC | Age: 64
End: 2024-06-18
Payer: COMMERCIAL

## 2024-06-18 ENCOUNTER — THERAPY VISIT (OUTPATIENT)
Dept: PHYSICAL THERAPY | Facility: REHABILITATION | Age: 64
End: 2024-06-18
Attending: FAMILY MEDICINE
Payer: COMMERCIAL

## 2024-06-18 DIAGNOSIS — G89.29 CHRONIC PAIN OF RIGHT ANKLE: ICD-10-CM

## 2024-06-18 DIAGNOSIS — M72.2 PLANTAR FASCIITIS OF RIGHT FOOT: Primary | ICD-10-CM

## 2024-06-18 DIAGNOSIS — M25.571 CHRONIC PAIN OF RIGHT ANKLE: ICD-10-CM

## 2024-06-18 DIAGNOSIS — M25.671 ANKLE STIFFNESS, RIGHT: ICD-10-CM

## 2024-06-18 DIAGNOSIS — J45.30 MILD PERSISTENT ASTHMA WITHOUT COMPLICATION: ICD-10-CM

## 2024-06-18 PROCEDURE — 99213 OFFICE O/P EST LOW 20 MIN: CPT | Mod: 95 | Performed by: ALLERGY & IMMUNOLOGY

## 2024-06-18 PROCEDURE — 97110 THERAPEUTIC EXERCISES: CPT | Mod: GP | Performed by: PHYSICAL THERAPIST

## 2024-06-18 PROCEDURE — 97140 MANUAL THERAPY 1/> REGIONS: CPT | Mod: GP | Performed by: PHYSICAL THERAPIST

## 2024-06-18 RX ORDER — BUDESONIDE AND FORMOTEROL FUMARATE DIHYDRATE 160; 4.5 UG/1; UG/1
AEROSOL RESPIRATORY (INHALATION)
Qty: 20.4 G | Refills: 5 | Status: SHIPPED | OUTPATIENT
Start: 2024-06-18

## 2024-06-18 NOTE — PROGRESS NOTES
Vanessa is a 64 year old who is being evaluated via a billable video visit.              Subjective   Vanessa is a 64 year old, presenting for the following health issues:  RECHECK (Asthma and allergies)    Video Start Time:  1128    HPI     Chief complaint: Follow up asthma     history of present illness: This is a pleasant 64-year-old woman with a history of allergic rhinitis and mild persistent asthma here today for follow-up visit.  Last seen in December.  Patient is currently taking Symbicort 160/4.5 2 puffs twice daily.  Demonstrates correct technique.  She has had no need for rescue inhaler.  She reports allergy symptoms are doing well.  She thinks she had an allergic reaction to amlodipine with hives.  This was subsequently stopped and changed to her lisinopril.  She reports no further hives.  She reports her itchy eyes sneezing and congestion are well-controlled.  She has no other concerns.  Denies any mouth or throat pain with the asthma.              Objective           Vitals:  No vitals were obtained today due to virtual visit.    Physical Exam   GENERAL: alert and no distress  EYES: Eyes grossly normal to inspection.  No discharge or erythema, or obvious scleral/conjunctival abnormalities.  RESP: No audible wheeze, cough, or visible cyanosis.    SKIN: Visible skin clear. No significant rash, abnormal pigmentation or lesions.  NEURO: Cranial nerves grossly intact.  Mentation and speech appropriate for age.  PSYCH: Appropriate affect, tone, and pace of words    Impression report and plan:  1.  Mild persistent asthma  2.  Allergic rhinitis    Continue Symbicort at current dosing.  Reviewed technique.  Follow in 6 months.  Okay to use Symbicort as needed as a rescue inhaler as well up to 12 puffs daily max.  [facility]      Video-Visit Details    Type of service:  Video Visit   Video End Time:11:33 AM  Originating Location (pt. Location): Home    Distant Location (provider  location):  On-site  Platform used for Video Visit: Mavis  Signed Electronically by: Jeni COOPER MD

## 2024-06-18 NOTE — LETTER
6/18/2024      Vanessa Franco  [id number]  [place] [address]      Dear Colleague,    Thank you for referring your patient, Vanessa Franco, to the [facility]. Please see a copy of my visit note below.    Vanessa is a 64 year old who is being evaluated via a billable video visit.              Subjective  Vanessa is a 64 year old, presenting for the following health issues:  RECHECK (Asthma and allergies)    Video Start Time:  1128    HPI     Chief complaint: Follow up asthma     history of present illness: This is a pleasant 64-year-old woman with a history of allergic rhinitis and mild persistent asthma here today for follow-up visit.  Last seen in December.  Patient is currently taking Symbicort 160/4.5 2 puffs twice daily.  Demonstrates correct technique.  She has had no need for rescue inhaler.  She reports allergy symptoms are doing well.  She thinks she had an allergic reaction to amlodipine with hives.  This was subsequently stopped and changed to her lisinopril.  She reports no further hives.  She reports her itchy eyes sneezing and congestion are well-controlled.  She has no other concerns.  Denies any mouth or throat pain with the asthma.              Objective          Vitals:  No vitals were obtained today due to virtual visit.    Physical Exam   GENERAL: alert and no distress  EYES: Eyes grossly normal to inspection.  No discharge or erythema, or obvious scleral/conjunctival abnormalities.  RESP: No audible wheeze, cough, or visible cyanosis.    SKIN: Visible skin clear. No significant rash, abnormal pigmentation or lesions.  NEURO: Cranial nerves grossly intact.  Mentation and speech appropriate for age.  PSYCH: Appropriate affect, tone, and pace of words    Impression report and plan:  1.  Mild persistent asthma  2.  Allergic rhinitis    Continue Symbicort at current dosing.  Reviewed technique.  Follow in 6 months.  Okay to use Symbicort as needed as a rescue  inhaler as well up to 12 puffs daily max.  Asthma action plan placed in her MyChart.      Video-Visit Details    Type of service:  Video Visit   Video End Time:11:33 AM  Originating Location (pt. Location): Home    Distant Location (provider location):  On-site  Platform used for Video Visit: Mavis  Signed Electronically by: Jeni COOPER MD        Again, thank you for allowing me to participate in the care of your patient.        Sincerely,        Jeni COOPER MD

## 2024-06-18 NOTE — PATIENT INSTRUCTIONS
Symbicort 2 puffs twice daily     Follow in 6 months with myself or our PA Heaven Zimmer to use Symbicort as needed up to 12 puffs daily max    Asthma action plan

## 2024-06-18 NOTE — LETTER
My Asthma Action Plan    Name: Vanessa Franco   YOB: 1960  Date: 6/18/2024   My doctor: Jeni COOPER MD   My clinic: [facility]        My Control Medicine: Budesonide + formoterol (Symbicort HFA) -  160/4.5 mcg 2 puff twice daily   My Rescue Medicine: Symbicort 1-2 puffs as needed up to 12 puffs daily max   My Asthma Severity:   Mild Persistent  Know your asthma triggers: smoke, upper respiratory infections, pollens, humidity, strong odors and fumes, exercise or sports, emotions, and cold air               GREEN ZONE   Good Control  I feel good  No cough or wheeze  Can work, sleep and play without asthma symptoms       Take your asthma control medicine every day.     If exercise triggers your asthma, take your rescue medication  15 minutes before exercise or sports, and  During exercise if you have asthma symptoms  Spacer to use with inhaler: If you have a spacer, make sure to use it with your inhaler             YELLOW ZONE Getting Worse  I have ANY of these:  I do not feel good  Cough or wheeze  Chest feels tight  Wake up at night   Keep taking your Green Zone medications  Start taking your rescue medicine:  every 20 minutes for up to 1 hour. Then every 4 hours for 24-48 hours.  If you stay in the Yellow Zone for more than 12-24 hours, contact your doctor.  If you do not return to the Green Zone in 12-24 hours or you get worse, start taking your oral steroid medicine if prescribed by your provider.           RED ZONE Medical Alert - Get Help  I have ANY of these:  I feel awful  Medicine is not helping  Breathing getting harder  Trouble walking or talking  Nose opens wide to breathe       Take your rescue medicine NOW  If your provider has prescribed an oral steroid medicine, start taking it NOW  Call your doctor NOW  If you are still in the Red Zone after 20 minutes and you have not reached your doctor:  Take your rescue medicine again and  Call 911 or go to the  emergency room right away    See your regular doctor within 2 weeks of an Emergency Room or Urgent Care visit for follow-up treatment.          Annual Reminders:  Meet with Asthma Educator,  Flu Shot in the Fall, consider Pneumonia Vaccination for patients with asthma (aged 19 and older).    Pharmacy: [facility] [address] [place] [address] [address]    Electronically signed by Jeni COOPER MD   Date: 06/18/24                      Asthma Triggers  How To Control Things That Make Your Asthma Worse    Triggers are things that make your asthma worse.  Look at the list below to help you find your triggers and what you can do about them.  You can help prevent asthma flare-ups by staying away from your triggers.      Trigger                                                          What you can do   Cigarette Smoke  Tobacco smoke can make asthma worse. Do not allow smoking in your home, car or around you.  Be sure no one smokes at a child s day care or school.  If you smoke, ask your health care provider for ways to help you quit.  Ask family members to quit too.  Ask your health care provider for a referral to Quit Plan to help you quit smoking, or call 1-458-265-PLAN.     Colds, Flu, Bronchitis  These are common triggers of asthma. Wash your hands often.  Don t touch your eyes, nose or mouth.  Get a flu shot every year.     Dust Mites  These are tiny bugs that live in cloth or carpet. They are too small to see. Wash sheets and blankets in hot water every week.   Encase pillows and mattress in dust mite proof covers.  Avoid having carpet if you can. If you have carpet, vacuum weekly.   Use a dust mask and HEPA vacuum.   Pollen and Outdoor Mold  Some people are allergic to trees, grass, or weed pollen, or molds. Try to keep your windows closed.  Limit time out doors when pollen count is high.   Ask you health care provider about taking medicine during allergy season.     Animal Dander  Some  people are allergic to skin flakes, urine or saliva from pets with fur or feathers. Keep pets with fur or feathers out of your home.    If you can t keep the pet outdoors, then keep the pet out of your bedroom.  Keep the bedroom door closed.  Keep pets off cloth furniture and away from stuffed toys.     Mice, Rats, and Cockroaches   Some people are allergic to the waste from these pests.   Cover food and garbage.  Clean up spills and food crumbs.  Store grease in the refrigerator.   Keep food out of the bedroom.   Indoor Mold  This can be a trigger if your home has high moisture. Fix leaking faucets, pipes, or other sources of water.   Clean moldy surfaces.  Dehumidify basement if it is damp and smelly.   Smoke, Strong Odors, and Sprays  These can reduce air quality. Stay away from strong odors and sprays, such as perfume, powder, hair spray, paints, smoke incense, paint, cleaning products, candles and new carpet.   Exercise or Sports  Some people with asthma have this trigger. Be active!  Ask your doctor about taking medicine before sports or exercise to prevent symptoms.    Warm up for 5-10 minutes before and after sports or exercise.     Other Triggers of Asthma  Cold air:  Cover your nose and mouth with a scarf.  Sometimes laughing or crying can be a trigger.  Some medicines and food can trigger asthma.

## 2024-06-25 ENCOUNTER — TELEPHONE (OUTPATIENT)
Dept: ALLERGY | Facility: CLINIC | Age: 64
End: 2024-06-25
Payer: COMMERCIAL

## 2024-06-25 NOTE — TELEPHONE ENCOUNTER
6/25 left a message, patient requires a 6 months follow up for asthma in person, okay to see Heaven MOE) per Dr. Tate.

## 2024-07-05 ENCOUNTER — THERAPY VISIT (OUTPATIENT)
Dept: PHYSICAL THERAPY | Facility: REHABILITATION | Age: 64
End: 2024-07-05
Payer: COMMERCIAL

## 2024-07-05 DIAGNOSIS — M72.2 PLANTAR FASCIITIS OF RIGHT FOOT: Primary | ICD-10-CM

## 2024-07-05 DIAGNOSIS — G89.29 CHRONIC PAIN OF RIGHT ANKLE: ICD-10-CM

## 2024-07-05 DIAGNOSIS — M25.671 ANKLE STIFFNESS, RIGHT: ICD-10-CM

## 2024-07-05 DIAGNOSIS — M25.571 CHRONIC PAIN OF RIGHT ANKLE: ICD-10-CM

## 2024-07-05 PROCEDURE — 97140 MANUAL THERAPY 1/> REGIONS: CPT | Mod: GP

## 2024-07-05 PROCEDURE — 97112 NEUROMUSCULAR REEDUCATION: CPT | Mod: GP

## 2024-07-05 PROCEDURE — 97110 THERAPEUTIC EXERCISES: CPT | Mod: GP

## 2024-07-15 ENCOUNTER — OFFICE VISIT (OUTPATIENT)
Dept: FAMILY MEDICINE | Facility: CLINIC | Age: 64
End: 2024-07-15
Payer: COMMERCIAL

## 2024-07-15 VITALS
SYSTOLIC BLOOD PRESSURE: 147 MMHG | HEIGHT: 67 IN | WEIGHT: 190 LBS | TEMPERATURE: 98.5 F | DIASTOLIC BLOOD PRESSURE: 77 MMHG | BODY MASS INDEX: 29.82 KG/M2 | RESPIRATION RATE: 14 BRPM | OXYGEN SATURATION: 97 % | HEART RATE: 85 BPM

## 2024-07-15 DIAGNOSIS — F33.1 MODERATE EPISODE OF RECURRENT MAJOR DEPRESSIVE DISORDER (H): ICD-10-CM

## 2024-07-15 DIAGNOSIS — S76.212A INGUINAL STRAIN, LEFT, INITIAL ENCOUNTER: Primary | ICD-10-CM

## 2024-07-15 DIAGNOSIS — I10 ESSENTIAL HYPERTENSION, BENIGN: ICD-10-CM

## 2024-07-15 PROCEDURE — 99213 OFFICE O/P EST LOW 20 MIN: CPT | Performed by: NURSE PRACTITIONER

## 2024-07-15 RX ORDER — CYCLOBENZAPRINE HCL 10 MG
5-10 TABLET ORAL 3 TIMES DAILY PRN
Qty: 30 TABLET | Refills: 0 | Status: SHIPPED | OUTPATIENT
Start: 2024-07-15

## 2024-07-15 ASSESSMENT — PATIENT HEALTH QUESTIONNAIRE - PHQ9
10. IF YOU CHECKED OFF ANY PROBLEMS, HOW DIFFICULT HAVE THESE PROBLEMS MADE IT FOR YOU TO DO YOUR WORK, TAKE CARE OF THINGS AT HOME, OR GET ALONG WITH OTHER PEOPLE: NOT DIFFICULT AT ALL
SUM OF ALL RESPONSES TO PHQ QUESTIONS 1-9: 0
SUM OF ALL RESPONSES TO PHQ QUESTIONS 1-9: 0

## 2024-07-15 ASSESSMENT — ASTHMA QUESTIONNAIRES
QUESTION_4 LAST FOUR WEEKS HOW OFTEN HAVE YOU USED YOUR RESCUE INHALER OR NEBULIZER MEDICATION (SUCH AS ALBUTEROL): ONE OR TWO TIMES PER DAY
QUESTION_5 LAST FOUR WEEKS HOW WOULD YOU RATE YOUR ASTHMA CONTROL: COMPLETELY CONTROLLED
ACT_TOTALSCORE: 20
ACT_TOTALSCORE: 20
QUESTION_3 LAST FOUR WEEKS HOW OFTEN DID YOUR ASTHMA SYMPTOMS (WHEEZING, COUGHING, SHORTNESS OF BREATH, CHEST TIGHTNESS OR PAIN) WAKE YOU UP AT NIGHT OR EARLIER THAN USUAL IN THE MORNING: ONCE A WEEK
QUESTION_2 LAST FOUR WEEKS HOW OFTEN HAVE YOU HAD SHORTNESS OF BREATH: NOT AT ALL
QUESTION_1 LAST FOUR WEEKS HOW MUCH OF THE TIME DID YOUR ASTHMA KEEP YOU FROM GETTING AS MUCH DONE AT WORK, SCHOOL OR AT HOME: NONE OF THE TIME

## 2024-07-15 NOTE — PROGRESS NOTES
Assessment & Plan     Inguinal strain, left, initial encounter  **  - cyclobenzaprine (FLEXERIL) 10 MG tablet  Dispense: 30 tablet; Refill: 0    Moderate episode of recurrent major depressive disorder (H)  **    Essential hypertension, benign  **    -Blood pressure is elevated today.  Could be situational.  She is taking medications as directed.  I will have her follow-up with PCP for further management.  Try to avoid ibuprofen type products.  Can use Voltaren gel to the area, Tylenol as needed, apply ice or heat.  I ordered her Flexeril to aid in alleviating muscle spasm.  Leaning towards groin strain.   Conservative ways and warning signs were discussed with patient today.   -Mental health is stable.  She will continue to follow-up with PCP for further evaluation and management.               Vicky Mendez is a 64 year old, presenting for the following health issues:  Musculoskeletal Problem (Pulled left hip Muscle, notice 5x days )      7/15/2024     3:48 PM   Additional Questions   Roomed by Cooper   Wednesday, in the pool doing execises and noted a pull in the left groin area. The next day left groin pain got worse. It is a Pulling sensation. Hurts when sitting, bending, getting in or out of care. Left Groin and to anterior left hip. Aleve does help but tries to not take it related to her BP. No urinary changes.  No vaginal symptoms.  No constipation or diarrhea.  No abdominal pain.  No history of kidney stones.  No fever or chills.  She thinks she has a spasm.  Owns a  and try not to lift or carry.  She denied any low back pain.  She denied any numbness or tingling in her feet or legs.  It does hurt when she lifts her leg up.   -She feels that her mental health is stable.     History of Present Illness       Reason for visit:  Pulled muscle in thigh by my hip  Symptom onset:  3-7 days ago    She eats 2-3 servings of fruits and vegetables daily.She exercises with enough effort to increase her heart  "rate 30 to 60 minutes per day.    She is taking medications regularly.           Objective    BP (!) 147/77   Pulse 85   Temp 98.5  F (36.9  C) (Oral)   Resp 14   Ht 1.689 m (5' 6.5\")   Wt 86.2 kg (190 lb)   SpO2 97%   BMI 30.21 kg/m    Body mass index is 30.21 kg/m .  Physical Exam  Vitals and nursing note reviewed.   Constitutional:       Appearance: She is not ill-appearing or diaphoretic.   Cardiovascular:      Rate and Rhythm: Normal rate.   Pulmonary:      Effort: Pulmonary effort is normal.   Musculoskeletal:      Cervical back: No rigidity.      Left hip: Normal.      Right lower leg: No edema.      Left lower leg: No edema.        Legs:       Comments: Neg philippe test.   Skin:     General: Skin is warm.      Capillary Refill: Capillary refill takes less than 2 seconds.   Neurological:      General: No focal deficit present.      Mental Status: She is alert and oriented to person, place, and time.   Psychiatric:         Mood and Affect: Mood normal.         Behavior: Behavior normal.         Thought Content: Thought content normal.         Judgment: Judgment normal.                    Signed Electronically by: RICARDO Del Cid CNP    "

## 2024-07-19 ENCOUNTER — TELEPHONE (OUTPATIENT)
Dept: ALLERGY | Facility: CLINIC | Age: 64
End: 2024-07-19
Payer: COMMERCIAL

## 2024-07-19 NOTE — TELEPHONE ENCOUNTER
Retail Pharmacy Prior Authorization Team   Phone: [phone]    PA Initiation    Medication: budesonide-formoterol (SYMBICORT) 160-4.5 MCG/ACT Inhaler  Insurance Company: [employer] [place] - Phone [phone] Fax [phone]  Pharmacy Filling the Rx: [facility] [address] [facility] [address]  Filling Pharmacy Phone: [phone]  Filling Pharmacy Fax: [phone]  Start Date: 7/19/2024

## 2024-07-19 NOTE — TELEPHONE ENCOUNTER
"Prior Authorization Retail Medication Request    Medication/Dose: budesonide-formoterol (SYMBICORT) 160-4.5 MCG/ACT Inhaler  Diagnosis and ICD code (if different than what is on RX):  Mild persistent asthma without complication [J45.30]   New/renewal/insurance change PA/secondary ins. PA: New PA  Previously Tried and Failed:  N/A, prescribed per FRANCISCO guidelines  Rationale:  Provides good asthma control for patient, per last visit with Dr. Tate \"She has had no need for rescue inhaler\" since being on this medication.    Insurance   Primary: BLUE [employer]US   Insurance ID:  [id number]     Pharmacy Information (if different than what is on RX)  Name:  [facility] in [place], [place]  Phone:  [phone]  Fax: [phone]      "

## 2024-07-24 ENCOUNTER — THERAPY VISIT (OUTPATIENT)
Dept: PHYSICAL THERAPY | Facility: REHABILITATION | Age: 64
End: 2024-07-24
Payer: COMMERCIAL

## 2024-07-24 DIAGNOSIS — M25.671 ANKLE STIFFNESS, RIGHT: ICD-10-CM

## 2024-07-24 DIAGNOSIS — M72.2 PLANTAR FASCIITIS OF RIGHT FOOT: Primary | ICD-10-CM

## 2024-07-24 DIAGNOSIS — M25.571 CHRONIC PAIN OF RIGHT ANKLE: ICD-10-CM

## 2024-07-24 DIAGNOSIS — G89.29 CHRONIC PAIN OF RIGHT ANKLE: ICD-10-CM

## 2024-07-24 PROCEDURE — 97110 THERAPEUTIC EXERCISES: CPT | Mod: GP | Performed by: PHYSICAL THERAPIST

## 2024-07-24 PROCEDURE — 97112 NEUROMUSCULAR REEDUCATION: CPT | Mod: GP | Performed by: PHYSICAL THERAPIST

## 2024-07-30 ENCOUNTER — TELEPHONE (OUTPATIENT)
Dept: FAMILY MEDICINE | Facility: CLINIC | Age: 64
End: 2024-07-30

## 2024-07-30 NOTE — TELEPHONE ENCOUNTER
Patient called in about Amlodipine. She states that they told her there were no refills on file. RN reviewed chart, she does have one year supply to get her through December. RN called pharmacy and verified she has refills, they said she does have refills and they are getting it ready for pickup today.     RN called patient back as requested and left message letting her know that her Amlodipine will be ready for pickup today. Encouraged call back to clinic with questions.

## 2024-08-21 NOTE — TELEPHONE ENCOUNTER
Talked to patient and she asked to cancel procedure and she will call when ready to reschedule the procedure. Close the loop with provider   Abdomen soft, mild ttp to the lateral right abdominal/flank region, no guarding.

## 2024-09-03 ENCOUNTER — OFFICE VISIT (OUTPATIENT)
Dept: FAMILY MEDICINE | Facility: CLINIC | Age: 64
End: 2024-09-03
Payer: COMMERCIAL

## 2024-09-03 VITALS
BODY MASS INDEX: 30.58 KG/M2 | RESPIRATION RATE: 22 BRPM | WEIGHT: 192.31 LBS | DIASTOLIC BLOOD PRESSURE: 90 MMHG | OXYGEN SATURATION: 97 % | SYSTOLIC BLOOD PRESSURE: 156 MMHG | HEART RATE: 89 BPM | TEMPERATURE: 97.3 F

## 2024-09-03 DIAGNOSIS — K62.5 RECTAL BLEEDING: ICD-10-CM

## 2024-09-03 DIAGNOSIS — I10 ESSENTIAL HYPERTENSION, BENIGN: Primary | ICD-10-CM

## 2024-09-03 DIAGNOSIS — K62.3 RECTAL PROLAPSE: ICD-10-CM

## 2024-09-03 DIAGNOSIS — Z23 IMMUNIZATION DUE: ICD-10-CM

## 2024-09-03 DIAGNOSIS — Z12.11 COLON CANCER SCREENING: ICD-10-CM

## 2024-09-03 DIAGNOSIS — K62.89 PROCTITIS: ICD-10-CM

## 2024-09-03 PROCEDURE — 99214 OFFICE O/P EST MOD 30 MIN: CPT | Mod: 25 | Performed by: FAMILY MEDICINE

## 2024-09-03 PROCEDURE — 90471 IMMUNIZATION ADMIN: CPT | Performed by: FAMILY MEDICINE

## 2024-09-03 PROCEDURE — 90677 PCV20 VACCINE IM: CPT | Performed by: FAMILY MEDICINE

## 2024-09-03 RX ORDER — AMLODIPINE BESYLATE 10 MG/1
10 TABLET ORAL DAILY
Qty: 90 TABLET | Refills: 3 | Status: SHIPPED | OUTPATIENT
Start: 2024-09-03

## 2024-09-03 ASSESSMENT — PAIN SCALES - GENERAL: PAINLEVEL: NO PAIN (0)

## 2024-09-03 NOTE — PROGRESS NOTES
Assessment & Plan     Essential hypertension, benign  Uncontrolled.  Will increase amlodipine from 5 mg daily to 10 mg daily.  Continue lisinopril 40 mg daily.  Advised patient to follow-up in 3 months for annual visit.  - amLODIPine (NORVASC) 10 MG tablet; Take 1 tablet (10 mg) by mouth daily.    Proctitis  Probably secondary to frequent rectal prolapsing.  Referral to colorectal specialist as she is not responding to high-fiber diet.  - Adult Colorectal Surgery  Referral; Future    Rectal prolapse  Referral to colorectal specialist as she has not responded to high-fiber diet.  - Adult Colorectal Surgery  Referral; Future    Rectal bleeding  Probably secondary to frequent rectal prolapsing.  - Adult Colorectal Surgery  Referral; Future    Colon cancer screening  - Colonoscopy Screening  Referral; Future    Immunization due  Patient has history of asthma, Prevnar 20 given today.  - PNEUMOCOCCAL 20 VALENT CONJUGATE (PREVNAR 20)        The longitudinal plan of care for the diagnosis(es)/condition(s) as documented were addressed during this visit. Due to the added complexity in care, I will continue to support Vanessa in the subsequent management and with ongoing continuity of care.          Subjective   Vanessa is a 64 year old, presenting for the following health issues:  Gastrointestinal Problem (Says has anal fissures and hemorrhoids.  Blood on tissue after having bowel movement.  Eats a lot of fiber - broccoli, greens, cabbage.  Does not take fiber supplement.  States stomach is cramping and the urge to have a bowel movement is always present.)        9/3/2024     1:13 PM   Additional Questions   Roomed by Coty CARR MA   Accompanied by Self     History of Present Illness       Reason for visit:  BP issues           Proctitis:  Onset/Duration: Years ago  Description:       Consistency of stool: States all of those descriptors - watery pasty explosive constant.        Blood in stool:  YES       Number of loose stools past 24 hours: 2  Progression of Symptoms: worsening  Accompanying signs and symptoms:       Fever: No       Nausea/Vomiting: No       Abdominal pain: YES       Weight loss: No       Episodes of constipation: YES  History   Ill contacts: No  Recent use of antibiotics: No  Recent travels: No  Recent medication-new or changes(Rx or OTC): No  Precipitating or alleviating factors: None  Therapies tried and outcome: Aleve and Flexeril  Patient states that she feels her rectum prolapsing.  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  She states that with every bowel movement she does have prolapsing, which sometimes does not retract spontaneously.  She states she has rectal bleeding with every bowel movement and there is rectal pain.  She has tried to increase her fiber without change in symptoms.    HTN: Patient is on amlodipine 5 mg and lisinopril 40 mg daily.  She states that blood pressure medication does possibly cause her to have dry mouth, which she treats with regular water intake.              Objective    BP (!) 156/90 (BP Location: Left arm, Patient Position: Sitting, Cuff Size: Adult Regular)   Pulse 89   Temp 97.3  F (36.3  C) (Temporal)   Resp 22   Wt 87.2 kg (192 lb 5 oz)   SpO2 97%   BMI 30.58 kg/m    Body mass index is 30.58 kg/m .  Physical Exam               Signed Electronically by: Goran Faustin MD

## 2024-09-04 NOTE — PROGRESS NOTES
DISCHARGE  Reason for Discharge: Patient has met all goals.  Pt cx remaining visits stating condition improved     Equipment Issued:     Discharge Plan: Patient to continue home program.    Referring Provider:  Goran Faustin         07/24/24 0500   Appointment Info   Signing clinician's name / credentials Marion Sevilla PT   Total/Authorized Visits (E&T)   Visits Used 4   Medical Diagnosis Plantar fasciitis of right foot   PT Tx Diagnosis Plantar fasciitis of right foot, chronic right ankle pain, right ankle stiffness   Precautions/Limitations PMH ankle fusion, limited R ankle DF   Quick Adds Certification   Progress Note/Certification   Start of Care Date 06/11/24   Onset of illness/injury or Date of Surgery 04/29/24  (order date due to chronicity of problem)   Therapy Frequency 1x/week   Predicted Duration 12 weeks   Certification date from 06/11/24   Certification date to 09/03/24   Progress Note Due Date 09/03/24   Progress Note Completed Date 06/11/24   GOALS   PT Goals 2;3   PT Goal 1   Goal Identifier Walking   Goal Description Pt will be able to walk for 20 min with <4/10 pain to improve her aerobic endurance and community mobility   Target Date 09/03/24   PT Goal 2   Goal Identifier Work   Goal Description Pt will be able to stand and lift throughout the workday with <4/10 pain to improve her QOL   Target Date 09/03/24   Subjective Report   Subjective Report I did get a brace for in my shoe and it does help.  I have been doing the exercises and everything seems to be helping.   Treatment Interventions (PT)   Interventions Therapeutic Procedure/Exercise;Self Care/Home Management;Manual Therapy;Neuromuscular Re-education   Therapeutic Procedure/Exercise   Therapeutic Procedures: strength, endurance, ROM, flexibility minutes (38372) 35   Therapeutic Procedures Ther Proc 2   Ther Proc 1 NuStep -- 5 min, workload 5   Ther Proc 1 - Details towel scrunch: inversion and eversion X 10-20n or fatigue, B   Ther  "Proc 2 stretches: sitting hamstring, gastroc at wall and step, soleus at wall and step: hold 30 seconds X 1-3 reps, B   Ther Proc 2 - Details sitting and supine: butterfly stretch, hold 30 seconds X 1-3 reps   PTRx Ther Proc 1 Manual Plantar Fascia Stretch   PTRx Ther Proc 1 - Details 2x 10 sec hold on R   PTRx Ther Proc 2 Plantar Fascia Release/Bottle Roll   PTRx Ther Proc 2 - Details Has not tried yet verbal review   PTRx Ther Proc 3 Isometric Ankle Plantarflexion   PTRx Ther Proc 3 - Details 5 x 10 sec hold x 2 sets on R   PTRx Ther Proc 4 Isometric Ankle Inversion   PTRx Ther Proc 4 - Details Verbal review- going well x 1 rep performed on R   PTRx Ther Proc 5 Isometric Ankle Eversion   PTRx Ther Proc 5 - Details Verbal review going well x 1 rep performed on R    PTRx Ther Proc 6 Isometric Ankle Dorsiflexion   PTRx Ther Proc 6 - Details Verbal review going well x 1 rep performed on R    PTRx Ther Proc 7 Bridging #1   PTRx Ther Proc 7 - Details 2 x 5 reps   PTRx Ther Proc 8 Hip Flexion Straight Leg Raise   PTRx Ther Proc 8 - Details 2 x 5 reps B   PTRx Ther Proc 9 Hip Abduction Straight Leg Raise   PTRx Ther Proc 9 - Details 2 x 5 reps B   Skilled Intervention Progression of HEP for R ankle strengthening and mobility to reduce pain and improve function   Patient Response/Progress likes all new exercises and will work on immersions for pain control or prevention prn   Ther Proc 3 heel and toe raises: hold 2-3 seconds in \"up\" position, repeat 10-20 times, B   Ther Proc 3 - Details cold immersion of foot: cold tap water with a few ice cubes: immerse foot 10-15 minutes   Ther Proc 4 warm immersion: warm tap water: immerse foot 10-15 minutes   Ther Proc 4 - Details contrast immersion: warm and cold water as above: start warm 10 minutes, cold 1 min, warm 1 min, cold 1 min..... for total of 21 minutes ending in cold   Neuromuscular Re-education   Neuromuscular re-ed of mvmt, balance, coord, kinesthetic sense, posture, " proprioception minutes (78193) 8   Neuro Re-ed 1 K-taping lateral to medial from calcaneus to ball of foot   Neuro Re-ed 1 - Details Doming/toe scrunches with towel; discussed plantar fascia arch brace should pt choose for self purchase   Patient Response/Progress KT not needed, struggled with balance exercises   Neuro Re-ed 2 - Details 1 leg stance: hold 15-30 seconds, 1+ times each foot   Neuro Re-ed 3 tandem stance: hold 15-30 seconds, 1-3 reps, B   Skilled Intervention balance and proprioception exercises to help with stability   Manual Therapy   Manual Therapy 1 STM   Manual Therapy 1 - Details supine STM to R plantar fascia and manual stretching 2 x 10-20 sec holds   Skilled Intervention STM and stretching to improve tissue mobility and reduce pain   Patient Response/Progress not needed   Self Care/home Management   Self Care Self Care 2   Education   Learner/Method Patient;Pictures/Video   Plan   Home program PTRx- print outs   Plan for next session progress ankle and overall LE strengthening as able  (recommended purchase of sleeve ankle support that can fit in shoe)   Comments   Comments Pt returns to therapy for right ankle and plantar fascia pain.  She feels the ankle support has been very helpful in reducing her pain levels and didn't feel she needed the KT or manual therapy today.  Pt able to learn many stretches for flexibility and mobility for her LE and foot which she felt will all be helpful to work on regularly.  She also likes the idea of the immersions for swelling and pain control as needed. Pt remains appropriate for skilled PT intervention to help decrease pain levels and improve functional abilities   Total Session Time   Timed Code Treatment Minutes 43   Total Treatment Time (sum of timed and untimed services) 43

## 2024-09-06 ENCOUNTER — TELEPHONE (OUTPATIENT)
Dept: SURGERY | Facility: CLINIC | Age: 64
End: 2024-09-06
Payer: COMMERCIAL

## 2024-09-06 NOTE — TELEPHONE ENCOUNTER
Diagnosis, Referred by & from: Rectal Prolapse, Proctitis, Rectal Bleeding   Appt date: 10/29/2024   NOTES STATUS DETAILS   OFFICE NOTE from referring provider [facility]:  9/3/24 - PCC OV with Dr. Faustin   OFFICE NOTE from other specialist [facility]:  7/15/24 - PCC OV with Emily Sanz NP  9/1/22 - PCC OV with Dr. Ngozi Santamaria  1/19/18 - PCC OV with Dr. Olivares   DISCHARGE SUMMARY from hospital N/A    DISCHARGE REPORT from the ER N/A    OPERATIVE REPORT N/A    MEDICATION LIST Internal    LABS N/A    DIAGNOSTIC PROCEDURES     COLONOSCOPY (most recent all time after 5 years) Received [facility]:  1/21/19 - Colonoscopy   IMAGING (DISC & REPORT)      CT Internal [facility]:  11/26/20 - CT Abd/Pelvis

## 2024-09-06 NOTE — TELEPHONE ENCOUNTER
[facility] Call Center    Phone Message    May a detailed message be left on voicemail: yes     Reason for Call: Other: Patient being referred for Rectal prolapse, Proctitis, and Rectal bleeding. Please review per protocols and reach out to patient.       Action Taken: Message routed to:  [facility]    Travel Screening: Not Applicable     Date of Service:

## 2024-09-13 ENCOUNTER — TRANSFERRED RECORDS (OUTPATIENT)
Dept: HEALTH INFORMATION MANAGEMENT | Facility: CLINIC | Age: 64
End: 2024-09-13
Payer: COMMERCIAL

## 2024-10-13 NOTE — PROGRESS NOTES
Colon and Rectal Surgery Clinic Note    RE: Vanessa Franco.  : 1960.  DARRYL: 10/29/2024.    Reason for visit: rectal prolapse and rectal bleeding.    HPI: Vanessa Franco is a 64 year old female who presents today for rectal prolapse and rectal bleeding. She has a past medical history of anxiety, chronic urticaria, HTN, and depression. On 2024 she underwent a screening colonoscopy which demonstrated a 2mm polyp in the cecum (clinically was a polyp but pathology with normal mucosa), two 2mm inflammatory polyps in the rectum and a visible rectal prolapse during exam. Xray defecography on 10/21/2024 demonstrated moderate rectal descent at rest, which increases during defecation. No evidence of intussusception or prolapse.     Today Vanessa reports some bleeding and prolapsing tissue after each BM.    Colonoscopy (2024):      Surgical Pathology (2024):      Xray defecography (10/21/2024):  IMPRESSION:  Moderate rectal descent at rest, which increases during defecation.  No evidence of intussusception or prolapse.   Complete evacuation of contrast. Rectal incontinence with straining.    Medical history:  Anxiety   Chronic urticaria   HTN  Depression     Surgical history:  Hysterectomy   C- section     Family history:  Family History   Problem Relation Age of Onset    Dementia Mother     Breast Cancer Mother     Lung Cancer Father      Medications:  Current Outpatient Medications   Medication Sig Dispense Refill    albuterol (PROAIR HFA/PROVENTIL HFA/VENTOLIN HFA) 108 (90 Base) MCG/ACT inhaler Inhale 2 puffs into the lungs every 6 hours as needed for shortness of breath, wheezing or cough 18 g 5    amLODIPine (NORVASC) 10 MG tablet Take 1 tablet (10 mg) by mouth daily. 90 tablet 3    budesonide-formoterol (SYMBICORT) 160-4.5 MCG/ACT Inhaler Inhale 2 puffs twice daily plus 1-2 puffs as needed. May use up to 12 puffs per day. 20.4 g 5    cholecalciferol, vitamin D3, 1,000 unit (25 mcg) tablet Take  "1,000 Units by mouth daily      cyclobenzaprine (FLEXERIL) 10 MG tablet Take 0.5-1 tablets (5-10 mg) by mouth 3 times daily as needed for muscle spasms 30 tablet 0    fexofenadine (ALLEGRA) 180 MG tablet Take 180 mg by mouth daily      fish oil-omega-3 fatty acids 1000 MG capsule Take 2 g by mouth daily      FLUoxetine (PROZAC) 10 MG capsule Take 1 capsule (10 mg) by mouth daily. 90 capsule 1    lisinopril (ZESTRIL) 40 MG tablet Take 1 tablet (40 mg) by mouth daily 90 tablet 3    vitamin B complex with vitamin C (VITAMIN  B COMPLEX) tablet Take 1 tablet by mouth daily      hydrOXYzine HCl (ATARAX) 25 MG tablet Take 1 tablet (25 mg) by mouth every 8 hours as needed for itching (Patient not taking: Reported on 10/29/2024) 30 tablet 0    magnesium 250 MG tablet Take 1 tablet by mouth daily (Patient not taking: Reported on 10/29/2024)         Allergies:  Allergies   Allergen Reactions    Morphine Hives    Sulfa (Sulfonamide Antibiotics) [Sulfa Antibiotics] Hives       Social history:   Social History     Tobacco Use    Smoking status: Never     Passive exposure: Never    Smokeless tobacco: Never   Substance Use Topics    Alcohol use: Yes     Alcohol/week: 1.0 standard drink of alcohol     Comment: Alcoholic Drinks/day: Yearly     Marital status: .    ROS:  A complete review of systems was performed with the patient and all systems negative except as per HPI.    Physical Examination:  Exam was chaperoned by Melvin Wiggins, EMT-P  /85 (BP Location: Left arm, Patient Position: Sitting, Cuff Size: Adult Large)   Pulse 82   Ht 5' 6.5\"   Wt 199 lb 12.8 oz   SpO2 100%   BMI 31.77 kg/m    General: Well hydrated. No acute distress.  CV: RRR  Lung: Non-labored breathing on RA  Abdomen: Soft, NT, obese habitus. No inguinal adenopathy palpated.  Perianal external examination:  Perianal skin: intact.  Lesions: No.  Eversion of buttocks: There was not evidence of an anal fissure. Details: N/A.  Skin tags or external " hemorrhoids: Yes: small anterior.  Digital rectal examination: Was performed.   Sphincter tone: Good.  Palpable lesions: No.  Other: None.    ANOSCOPY: internal hemorrhoids. More prominent column at the RP location.    PROCEDURE:  After discussing the risks and benefits, the patient agreed to proceed with internal hemorrhoidal banding.    Prior to the start of the procedure and with procedural staff participation, I verbally confirmed the patient s identity using two indicators, relevant allergies, that the procedure was appropriate and matched the consent or emergent situation, and that the correct equipment/implants were available. Immediately prior to starting the procedure I conducted the Time Out with the procedural staff and re-confirmed the patient s name, procedure, and site/side. (The Joint Commission universal protocol was followed.)  Yes    Sedation (Moderate or Deep): None    A suction hemorrhoidal  was used to place a total of 1 band in the right posterior position.    There was no significant bleeding. The patient tolerated the procedure well.      ASSESSMENT    This is a 64 year old F who presents with grade II internal hemorrhoids. All questions were answered. We discussed management and she opted to try with banding. She voiced understanding.    All pertinent labs and imaging were personally reviewed by me.    Discussed managing these with hemorrhoidal banding. Discussed that several banding procedures may be needed and that this will not necessarily resolve the external hemorrhoidal skin tags. Patient states an understanding of this and states an understanding that there is a small risk of bleeding today and when the band falls off in 1-2 weeks. Also advised patient that there is a remote chance of infection. Recommended avoiding heavy lifting and remain off aspirin for two weeks. Patient verbalized an understanding of these risks and wished to proceed today.       PLAN  1) I performed  hemorrhoidal banding today  2) You may return in 8 weeks for another banding if symptoms continue.  3) There is a small risk of bleeding and remote chance of infection. Contact us in the interim if there are any concerns.   5) Try a fiber supplement such as Citrucel 2-3 times a day for constipation  6) Drink 8-10 glasses of water with this  7) Can additionally us stool softeners or a laxative such as Miralax to keep stools soft  8) No heavy lifting or aspirin use for 2 weeks  9) Healthy life style and weight loss were encouraged      30 minutes spent on the date of the encounter doing chart review, history and exam, imaging review, documentation and further activities as noted above, excluding the procedure.      Wong Tyler MD    Division of Colon and Rectal Surgery  [facility]    Referring Provider:  Goran Faustin MD  [address] [place] [address]     Primary Care Provider:  Goran Faustin

## 2024-10-14 ENCOUNTER — PATIENT OUTREACH (OUTPATIENT)
Dept: SURGERY | Facility: CLINIC | Age: 64
End: 2024-10-14
Payer: COMMERCIAL

## 2024-10-16 ENCOUNTER — TELEPHONE (OUTPATIENT)
Dept: SURGERY | Facility: CLINIC | Age: 64
End: 2024-10-16
Payer: COMMERCIAL

## 2024-10-16 DIAGNOSIS — K62.3 RECTAL PROLAPSE: Primary | ICD-10-CM

## 2024-10-16 NOTE — TELEPHONE ENCOUNTER
Patient confirmed scheduled appointment:  Date: 10/21/24  Time: 100 pm   Visit type: Xray  Provider: imaging  [place] [facility] [place]   Testing/imaging: Defecography  Additional notes: n/a

## 2024-10-21 ENCOUNTER — ANCILLARY PROCEDURE (OUTPATIENT)
Dept: GENERAL RADIOLOGY | Facility: CLINIC | Age: 64
End: 2024-10-21
Attending: SURGERY
Payer: COMMERCIAL

## 2024-10-21 DIAGNOSIS — K62.3 RECTAL PROLAPSE: ICD-10-CM

## 2024-10-21 PROCEDURE — 74270 X-RAY XM COLON 1CNTRST STD: CPT | Performed by: RADIOLOGY

## 2024-10-28 DIAGNOSIS — F33.1 MODERATE EPISODE OF RECURRENT MAJOR DEPRESSIVE DISORDER (H): ICD-10-CM

## 2024-10-28 RX ORDER — FLUOXETINE 10 MG/1
10 CAPSULE ORAL DAILY
Qty: 90 CAPSULE | Refills: 1 | Status: SHIPPED | OUTPATIENT
Start: 2024-10-28

## 2024-10-29 ENCOUNTER — PRE VISIT (OUTPATIENT)
Dept: SURGERY | Facility: CLINIC | Age: 64
End: 2024-10-29

## 2024-10-29 ENCOUNTER — OFFICE VISIT (OUTPATIENT)
Dept: SURGERY | Facility: CLINIC | Age: 64
End: 2024-10-29
Attending: FAMILY MEDICINE
Payer: COMMERCIAL

## 2024-10-29 VITALS
DIASTOLIC BLOOD PRESSURE: 85 MMHG | WEIGHT: 199.8 LBS | SYSTOLIC BLOOD PRESSURE: 125 MMHG | BODY MASS INDEX: 31.36 KG/M2 | OXYGEN SATURATION: 100 % | HEART RATE: 82 BPM | HEIGHT: 67 IN

## 2024-10-29 DIAGNOSIS — E66.811 CLASS 1 OBESITY WITH SERIOUS COMORBIDITY AND BODY MASS INDEX (BMI) OF 31.0 TO 31.9 IN ADULT, UNSPECIFIED OBESITY TYPE: ICD-10-CM

## 2024-10-29 DIAGNOSIS — K62.3 RECTAL PROLAPSE: ICD-10-CM

## 2024-10-29 DIAGNOSIS — K62.89 PROCTITIS: ICD-10-CM

## 2024-10-29 DIAGNOSIS — K62.5 RECTAL BLEEDING: ICD-10-CM

## 2024-10-29 DIAGNOSIS — K64.1 GRADE II HEMORRHOIDS: Primary | ICD-10-CM

## 2024-10-29 PROCEDURE — 46221 LIGATION OF HEMORRHOID(S): CPT | Performed by: SURGERY

## 2024-10-29 PROCEDURE — 99203 OFFICE O/P NEW LOW 30 MIN: CPT | Mod: 25 | Performed by: SURGERY

## 2024-10-29 ASSESSMENT — PAIN SCALES - GENERAL: PAINLEVEL_OUTOF10: MILD PAIN (2)

## 2024-10-29 NOTE — PATIENT INSTRUCTIONS
You may return in 8 weeks for another banding if symptoms continue.  There is a small risk of bleeding and remote chance of infection. Contact us in the interim if there are any concerns.   Try a fiber supplement such as Citrucel 2-3 times a day for constipation.  4 . Can additionally us stool softeners or a laxative such as Miralax to keep stools soft.  Drink 8-10 glasses of water daily.   5.  No heavy lifting or aspirin use for 3 weeks.

## 2024-10-29 NOTE — LETTER
10/29/2024       RE: Vanessa Franco  [address]  [facility] [address]     Dear Colleague,    Thank you for referring your patient, Vanessa Franco, to the [facility] COLON AND RECTAL SURGERY CLINIC [place] at [facility]. Please see a copy of my visit note below.    Colon and Rectal Surgery Clinic Note    RE: Vanessa Franco.  : 1960.  DARRYL: 10/29/2024.    Reason for visit: rectal prolapse and rectal bleeding.    HPI: Vanessa Franco is a 64 year old female who presents today for rectal prolapse and rectal bleeding. She has a past medical history of anxiety, chronic urticaria, HTN, and depression. On 2024 she underwent a screening colonoscopy which demonstrated a 2mm polyp in the cecum (clinically was a polyp but pathology with normal mucosa), two 2mm inflammatory polyps in the rectum and a visible rectal prolapse during exam. Xray defecography on 10/21/2024 demonstrated moderate rectal descent at rest, which increases during defecation. No evidence of intussusception or prolapse.     Today Vanessa reports some bleeding and prolapsing tissue after each BM.    Colonoscopy (2024):      Surgical Pathology (2024):      Xray defecography (10/21/2024):  IMPRESSION:  Moderate rectal descent at rest, which increases during defecation.  No evidence of intussusception or prolapse.   Complete evacuation of contrast. Rectal incontinence with straining.    Medical history:  Anxiety   Chronic urticaria   HTN  Depression     Surgical history:  Hysterectomy   C- section     Family history:  Family History   Problem Relation Age of Onset     Dementia Mother      Breast Cancer Mother      Lung Cancer Father      Medications:  Current Outpatient Medications   Medication Sig Dispense Refill     albuterol (PROAIR HFA/PROVENTIL HFA/VENTOLIN HFA) 108 (90 Base) MCG/ACT inhaler Inhale 2 puffs into the lungs every 6 hours as needed for shortness  "of breath, wheezing or cough 18 g 5     amLODIPine (NORVASC) 10 MG tablet Take 1 tablet (10 mg) by mouth daily. 90 tablet 3     budesonide-formoterol (SYMBICORT) 160-4.5 MCG/ACT Inhaler Inhale 2 puffs twice daily plus 1-2 puffs as needed. May use up to 12 puffs per day. 20.4 g 5     cholecalciferol, vitamin D3, 1,000 unit (25 mcg) tablet Take 1,000 Units by mouth daily       cyclobenzaprine (FLEXERIL) 10 MG tablet Take 0.5-1 tablets (5-10 mg) by mouth 3 times daily as needed for muscle spasms 30 tablet 0     fexofenadine (ALLEGRA) 180 MG tablet Take 180 mg by mouth daily       fish oil-omega-3 fatty acids 1000 MG capsule Take 2 g by mouth daily       FLUoxetine (PROZAC) 10 MG capsule Take 1 capsule (10 mg) by mouth daily. 90 capsule 1     lisinopril (ZESTRIL) 40 MG tablet Take 1 tablet (40 mg) by mouth daily 90 tablet 3     vitamin B complex with vitamin C (VITAMIN  B COMPLEX) tablet Take 1 tablet by mouth daily       hydrOXYzine HCl (ATARAX) 25 MG tablet Take 1 tablet (25 mg) by mouth every 8 hours as needed for itching (Patient not taking: Reported on 10/29/2024) 30 tablet 0     magnesium 250 MG tablet Take 1 tablet by mouth daily (Patient not taking: Reported on 10/29/2024)         Allergies:  Allergies   Allergen Reactions     Morphine Hives     Sulfa (Sulfonamide Antibiotics) [Sulfa Antibiotics] Hives       Social history:   Social History     Tobacco Use     Smoking status: Never     Passive exposure: Never     Smokeless tobacco: Never   Substance Use Topics     Alcohol use: Yes     Alcohol/week: 1.0 standard drink of alcohol     Comment: Alcoholic Drinks/day: Yearly     Marital status: .    ROS:  A complete review of systems was performed with the patient and all systems negative except as per HPI.    Physical Examination:  Exam was chaperoned by Melvin Wiggins, EMT-P  /85 (BP Location: Left arm, Patient Position: Sitting, Cuff Size: Adult Large)   Pulse 82   Ht 5' 6.5\"   Wt 199 lb 12.8 oz   " SpO2 100%   BMI 31.77 kg/m    General: Well hydrated. No acute distress.  CV: RRR  Lung: Non-labored breathing on RA  Abdomen: Soft, NT, obese habitus. No inguinal adenopathy palpated.  Perianal external examination:  Perianal skin: intact.  Lesions: No.  Eversion of buttocks: There was not evidence of an anal fissure. Details: N/A.  Skin tags or external hemorrhoids: Yes: small anterior.  Digital rectal examination: Was performed.   Sphincter tone: Good.  Palpable lesions: No.  Other: None.    ANOSCOPY: internal hemorrhoids. More prominent column at the RP location.    PROCEDURE:  After discussing the risks and benefits, the patient agreed to proceed with internal hemorrhoidal banding.    Prior to the start of the procedure and with procedural staff participation, I verbally confirmed the patient s identity using two indicators, relevant allergies, that the procedure was appropriate and matched the consent or emergent situation, and that the correct equipment/implants were available. Immediately prior to starting the procedure I conducted the Time Out with the procedural staff and re-confirmed the patient s name, procedure, and site/side. (The Joint Commission universal protocol was followed.)  Yes    Sedation (Moderate or Deep): None    A suction hemorrhoidal  was used to place a total of 1 band in the right posterior position.    There was no significant bleeding. The patient tolerated the procedure well.      ASSESSMENT    This is a 64 year old F who presents with grade II internal hemorrhoids. All questions were answered. We discussed management and she opted to try with banding. She voiced understanding.    All pertinent labs and imaging were personally reviewed by me.    Discussed managing these with hemorrhoidal banding. Discussed that several banding procedures may be needed and that this will not necessarily resolve the external hemorrhoidal skin tags. Patient states an understanding of this and  states an understanding that there is a small risk of bleeding today and when the band falls off in 1-2 weeks. Also advised patient that there is a remote chance of infection. Recommended avoiding heavy lifting and remain off aspirin for two weeks. Patient verbalized an understanding of these risks and wished to proceed today.       PLAN  1) I performed hemorrhoidal banding today  2) You may return in 8 weeks for another banding if symptoms continue.  3) There is a small risk of bleeding and remote chance of infection. Contact us in the interim if there are any concerns.   5) Try a fiber supplement such as Citrucel 2-3 times a day for constipation  6) Drink 8-10 glasses of water with this  7) Can additionally us stool softeners or a laxative such as Miralax to keep stools soft  8) No heavy lifting or aspirin use for 2 weeks  9) Healthy life style and weight loss were encouraged      30 minutes spent on the date of the encounter doing chart review, history and exam, imaging review, documentation and further activities as noted above, excluding the procedure.      Wong Tyler MD    Division of Colon and Rectal Surgery  [facility]    Referring Provider:  Goran Faustin MD  [address] [place] [address]     Primary Care Provider:  Goran Faustin      Again, thank you for allowing me to participate in the care of your patient.      Sincerely,    Wong Tyler MD

## 2024-10-29 NOTE — NURSING NOTE
"Chief Complaint   Patient presents with    Consult       Vitals:    10/29/24 1300   BP: 125/85   BP Location: Left arm   Patient Position: Sitting   Cuff Size: Adult Large   Pulse: 82   SpO2: 100%   Weight: 199 lb 12.8 oz   Height: 5' 6.5\"       Body mass index is 31.77 kg/m .    Geno Clinton CMA    "

## 2024-10-30 ENCOUNTER — TELEPHONE (OUTPATIENT)
Dept: FAMILY MEDICINE | Facility: CLINIC | Age: 64
End: 2024-10-30
Payer: COMMERCIAL

## 2024-10-30 NOTE — TELEPHONE ENCOUNTER
Patient called in she had lost her Fluoxetine aprox 9 days ago.    Medication was filled on 10/28.    Pharmacy did not fill as they were unaware that patient had lost previous medication and thought this was just an early refill.     RN called pharmacy and they are starting process for lost medication. They will notify patient when medication is ready to .    RN called patient back and explained. Patient was concerned about side effects of restarting medication. Patient is still on starting dose of medication RN educated patient that she can restart without tapering medication. Patient agreeable.    HARSHAL Cordova  [facility]  [phone]    [facility]   Monday  - Thursday 7 AM - 6 PM    Friday  7 AM - 5 PM     -Please call your clinic for assistance from a nurse after hours.

## 2024-11-14 ENCOUNTER — PATIENT OUTREACH (OUTPATIENT)
Dept: CARE COORDINATION | Facility: CLINIC | Age: 64
End: 2024-11-14
Payer: COMMERCIAL

## 2024-11-28 ENCOUNTER — PATIENT OUTREACH (OUTPATIENT)
Dept: CARE COORDINATION | Facility: CLINIC | Age: 64
End: 2024-11-28
Payer: COMMERCIAL

## 2024-12-19 ENCOUNTER — OFFICE VISIT (OUTPATIENT)
Dept: ALLERGY | Facility: CLINIC | Age: 64
End: 2024-12-19
Payer: COMMERCIAL

## 2024-12-19 VITALS — OXYGEN SATURATION: 98 % | BODY MASS INDEX: 32.27 KG/M2 | WEIGHT: 203 LBS | HEART RATE: 72 BPM

## 2024-12-19 DIAGNOSIS — H10.9 RHINOCONJUNCTIVITIS: Primary | ICD-10-CM

## 2024-12-19 DIAGNOSIS — J45.30 MILD PERSISTENT ASTHMA WITHOUT COMPLICATION: ICD-10-CM

## 2024-12-19 DIAGNOSIS — J31.0 RHINOCONJUNCTIVITIS: Primary | ICD-10-CM

## 2024-12-19 RX ORDER — BUDESONIDE AND FORMOTEROL FUMARATE DIHYDRATE 160; 4.5 UG/1; UG/1
AEROSOL RESPIRATORY (INHALATION)
Qty: 20.4 G | Refills: 5 | Status: SHIPPED | OUTPATIENT
Start: 2024-12-19

## 2024-12-19 RX ORDER — AZELASTINE 1 MG/ML
1-2 SPRAY, METERED NASAL 2 TIMES DAILY
Qty: 30 ML | Refills: 3 | Status: SHIPPED | OUTPATIENT
Start: 2024-12-19

## 2024-12-19 ASSESSMENT — ASTHMA QUESTIONNAIRES
QUESTION_1 LAST FOUR WEEKS HOW MUCH OF THE TIME DID YOUR ASTHMA KEEP YOU FROM GETTING AS MUCH DONE AT WORK, SCHOOL OR AT HOME: NONE OF THE TIME
QUESTION_4 LAST FOUR WEEKS HOW OFTEN HAVE YOU USED YOUR RESCUE INHALER OR NEBULIZER MEDICATION (SUCH AS ALBUTEROL): ONCE A WEEK OR LESS
ACT_TOTALSCORE: 21
QUESTION_2 LAST FOUR WEEKS HOW OFTEN HAVE YOU HAD SHORTNESS OF BREATH: ONCE OR TWICE A WEEK
ACT_TOTALSCORE: 21
QUESTION_5 LAST FOUR WEEKS HOW WOULD YOU RATE YOUR ASTHMA CONTROL: WELL CONTROLLED
QUESTION_3 LAST FOUR WEEKS HOW OFTEN DID YOUR ASTHMA SYMPTOMS (WHEEZING, COUGHING, SHORTNESS OF BREATH, CHEST TIGHTNESS OR PAIN) WAKE YOU UP AT NIGHT OR EARLIER THAN USUAL IN THE MORNING: ONCE OR TWICE

## 2024-12-19 NOTE — LETTER
My Asthma Action Plan  Name: Vanessa Franco   YOB: 1960  Date: 12/19/2024   My doctor: Goran Faustin   My clinic: [facility]      My Control Medicine: Symbicort         Dose: 160-4.5 mcg - 1 puffs as needed     My Rescue Medicine: Symbicort         Dose: 160-4.5 mcg, 1-2 puffs as needed     With onset of Illness, increase to 2 puffs twice daily.     May take up to 12 puffs daily    My Asthma Severity: Mild Persistent  Avoid your asthma triggers: smoke, upper respiratory infections, dust mites, pollens, animal dander, humidity, exercise or sports, emotions, and cold air        GREEN ZONE   Good Control  I feel good  No cough or wheeze  Can work, sleep and play without asthma symptoms       Take your asthma control medicine every day.     If exercise triggers your asthma, take your rescue medication  15 minutes before exercise or sports, and  During exercise if you have asthma symptoms  Spacer to use with inhaler: If you have a spacer, make sure to use it with your inhaler             YELLOW ZONE Getting Worse  I have ANY of these:  I do not feel good  Cough or wheeze  Chest feels tight  Wake up at night   Keep taking your Green Zone medications  Start taking your rescue medicine:  every 20 minutes for up to 1 hour. Then every 4 hours for 24-48 hours.  If you stay in the Yellow Zone for more than 12-24 hours, contact your doctor.  If you do not return to the Green Zone in 12-24 hours or you get worse, start taking your oral steroid medicine if prescribed by your provider.           RED ZONE Medical Alert - Get Help  I have ANY of these:  I feel awful  Medicine is not helping  Breathing getting harder  Trouble walking or talking  Nose opens wide to breathe       Take your rescue medicine NOW  If your provider has prescribed an oral steroid medicine, start taking it NOW  Call your doctor NOW  If you are still in the Red Zone after 20 minutes and you have not reached your  doctor:  Take your rescue medicine again and  Call 911 or go to the emergency room right away    See your regular doctor within 2 weeks of an Emergency Room or Urgent Care visit for follow-up treatment.          Electronically signed by: Heaven Lombardo PA-C, December 19, 2024    Annual Reminders:  Meet with Asthma Educator,  Flu Shot in the Fall, consider Pneumonia Vaccination for patients with asthma (aged 19 and older).    Pharmacy: [facility] [address] [place] [address] [address]        Asthma Triggers  How To Control Things That Make Your Asthma Worse    Triggers are things that make your asthma worse.  Look at the list below to help you find your triggers and what you can do about them.  You can help prevent asthma flare-ups by staying away from your triggers.      Trigger                                                          What you can do   Cigarette Smoke  Tobacco smoke can make asthma worse. Do not allow smoking in your home, car or around you.  Be sure no one smokes at a child s day care or school.  If you smoke, ask your health care provider for ways to help you quit.  Ask family members to quit too.  Ask your health care provider for a referral to Quit Plan to help you quit smoking, or call 5-982-903-PLAN.     Colds, Flu, Bronchitis  These are common triggers of asthma. Wash your hands often.  Don t touch your eyes, nose or mouth.  Get a flu shot every year.     Dust Mites  These are tiny bugs that live in cloth or carpet. They are too small to see. Wash sheets and blankets in hot water every week.   Encase pillows and mattress in dust mite proof covers.  Avoid having carpet if you can. If you have carpet, vacuum weekly.   Use a dust mask and HEPA vacuum.   Pollen and Outdoor Mold  Some people are allergic to trees, grass, or weed pollen, or molds. Try to keep your windows closed.  Limit time out doors when pollen count is high.   Ask you health care provider about taking  medicine during allergy season.     Animal Dander  Some people are allergic to skin flakes, urine or saliva from pets with fur or feathers. Keep pets with fur or feathers out of your home.    If you can t keep the pet outdoors, then keep the pet out of your bedroom.  Keep the bedroom door closed.  Keep pets off cloth furniture and away from stuffed toys.     Mice, Rats, and Cockroaches  Some people are allergic to the waste from these pests.   Cover food and garbage.  Clean up spills and food crumbs.  Store grease in the refrigerator.   Keep food out of the bedroom.   Indoor Mold  This can be a trigger if your home has high moisture. Fix leaking faucets, pipes, or other sources of water.   Clean moldy surfaces.  Dehumidify basement if it is damp and smelly.   Smoke, Strong Odors, and Sprays  These can reduce air quality. Stay away from strong odors and sprays, such as perfume, powder, hair spray, paints, smoke incense, paint, cleaning products, candles and new carpet.   Exercise or Sports  Some people with asthma have this trigger. Be active!  Ask your doctor about taking medicine before sports or exercise to prevent symptoms.    Warm up for 5-10 minutes before and after sports or exercise.     Other Triggers of Asthma  Cold air:  Cover your nose and mouth with a scarf.  Sometimes laughing or crying can be a trigger.  Some medicines and food can trigger asthma.

## 2024-12-19 NOTE — LETTER
My Asthma Action Plan  Name: Vanessa Franco   YOB: 1960  Date: 12/19/2024   My doctor: Goran Faustin   My clinic: [facility]      My Control Medicine: Symbicort         Dose: 160-4.5 mcg   1 puff once daily     My Rescue Medicine:  Symbicort, 1-2 puffs as needed for wheezing, chest tightness, or shortness of breath     With illness increase to 2 puffs twice daily.     You can use up to 12 puffs daily of this medication .         My Asthma Severity: Mild Persistent  Avoid your asthma triggers: smoke, upper respiratory infections, dust mites, pollens, animal dander, humidity, exercise or sports, emotions, and cold air        GREEN ZONE   Good Control  I feel good  No cough or wheeze  Can work, sleep and play without asthma symptoms       Take your asthma control medicine every day.     If exercise triggers your asthma, take your rescue medication  15 minutes before exercise or sports, and  During exercise if you have asthma symptoms  Spacer to use with inhaler: If you have a spacer, make sure to use it with your inhaler             YELLOW ZONE Getting Worse  I have ANY of these:  I do not feel good  Cough or wheeze  Chest feels tight  Wake up at night   Keep taking your Green Zone medications  Start taking your rescue medicine:  every 20 minutes for up to 1 hour. Then every 4 hours for 24-48 hours.  If you stay in the Yellow Zone for more than 12-24 hours, contact your doctor.  If you do not return to the Green Zone in 12-24 hours or you get worse, start taking your oral steroid medicine if prescribed by your provider.           RED ZONE Medical Alert - Get Help  I have ANY of these:  I feel awful  Medicine is not helping  Breathing getting harder  Trouble walking or talking  Nose opens wide to breathe       Take your rescue medicine NOW  If your provider has prescribed an oral steroid medicine, start taking it NOW  Call your doctor NOW  If you are still in the Red Zone after  20 minutes and you have not reached your doctor:  Take your rescue medicine again and  Call 911 or go to the emergency room right away    See your regular doctor within 2 weeks of an Emergency Room or Urgent Care visit for follow-up treatment.          Electronically signed by: Heaven Lombardo PA-C, December 19, 2024    Annual Reminders:  Meet with Asthma Educator,  Flu Shot in the Fall, consider Pneumonia Vaccination for patients with asthma (aged 19 and older).    Pharmacy: [facility] [address] [place] [address] [address]      Asthma Triggers  How To Control Things That Make Your Asthma Worse    Triggers are things that make your asthma worse.  Look at the list below to help you find your triggers and what you can do about them.  You can help prevent asthma flare-ups by staying away from your triggers.      Trigger                                                          What you can do   Cigarette Smoke  Tobacco smoke can make asthma worse. Do not allow smoking in your home, car or around you.  Be sure no one smokes at a child s day care or school.  If you smoke, ask your health care provider for ways to help you quit.  Ask family members to quit too.  Ask your health care provider for a referral to Quit Plan to help you quit smoking, or call 6-957-378-PLAN.     Colds, Flu, Bronchitis  These are common triggers of asthma. Wash your hands often.  Don t touch your eyes, nose or mouth.  Get a flu shot every year.     Dust Mites  These are tiny bugs that live in cloth or carpet. They are too small to see. Wash sheets and blankets in hot water every week.   Encase pillows and mattress in dust mite proof covers.  Avoid having carpet if you can. If you have carpet, vacuum weekly.   Use a dust mask and HEPA vacuum.   Pollen and Outdoor Mold  Some people are allergic to trees, grass, or weed pollen, or molds. Try to keep your windows closed.  Limit time out doors when pollen count is high.   Ask  you health care provider about taking medicine during allergy season.     Animal Dander  Some people are allergic to skin flakes, urine or saliva from pets with fur or feathers. Keep pets with fur or feathers out of your home.    If you can t keep the pet outdoors, then keep the pet out of your bedroom.  Keep the bedroom door closed.  Keep pets off cloth furniture and away from stuffed toys.     Mice, Rats, and Cockroaches  Some people are allergic to the waste from these pests.   Cover food and garbage.  Clean up spills and food crumbs.  Store grease in the refrigerator.   Keep food out of the bedroom.   Indoor Mold  This can be a trigger if your home has high moisture. Fix leaking faucets, pipes, or other sources of water.   Clean moldy surfaces.  Dehumidify basement if it is damp and smelly.   Smoke, Strong Odors, and Sprays  These can reduce air quality. Stay away from strong odors and sprays, such as perfume, powder, hair spray, paints, smoke incense, paint, cleaning products, candles and new carpet.   Exercise or Sports  Some people with asthma have this trigger. Be active!  Ask your doctor about taking medicine before sports or exercise to prevent symptoms.    Warm up for 5-10 minutes before and after sports or exercise.     Other Triggers of Asthma  Cold air:  Cover your nose and mouth with a scarf.  Sometimes laughing or crying can be a trigger.  Some medicines and food can trigger asthma.

## 2024-12-19 NOTE — PATIENT INSTRUCTIONS
For colds/congestion - Afrin - please dont use more than 3 days in a row!     As needed nasal spray - (antihistamines nasal spray)- azelastine nasal spray, 1-2 sprays in each nostril twice daily as needed. - safe to use daily if you need to

## 2024-12-19 NOTE — PROGRESS NOTES
"Vanessa Franco was seen in the Allergy Clinic at [facility].    Vanessa Franco is a 64 year old female  being seen today for ongoing evaluation of mild persistent asthma, allergic rhinitis, tree pollen and weed pollen allergy.    History of Present Illness:     Patient reports since her last appointment with Dr. Tate 2024 she was feeling significantly better.    She reports she had increasing hunger and felt like she was overtreating her asthma, had decreased her inhaler use to 1 puff once daily and has continued to feel great until approximately 2 weeks ago.  She reports in the last 2 weeks she has had asthma symptoms, and has been using her Symbicort rescue inhaler approximately every other day.  She reports with this she has had a few nocturnal awakenings.  She reports that with this illness she does have some daytime asthma symptoms, which she has not been treating.  With his illness she reports she has also had significant congestion.    Denies any concerns about environmental allergies.     Video-Visit Details    Past Medical History:   Diagnosis Date    Anxiety     Chronic urticaria 2020    Environmental allergies     History of cataract surgery     no anestesia issue - BL removed    Hypertension     Mild major depression (H) 2021    no more depression after stopping atenolol       Past Surgical History:   Procedure Laterality Date    ANKLE FRACTURE SURGERY      BREAST SURGERY       SECTION      HYSTERECTOMY, PAP NO LONGER INDICATED      \"She does not need to have Pap smear\"    MAMMOPLASTY REDUCTION Bilateral 1993         Current Outpatient Medications:     albuterol (PROAIR HFA/PROVENTIL HFA/VENTOLIN HFA) 108 (90 Base) MCG/ACT inhaler, Inhale 2 puffs into the lungs every 6 hours as needed for shortness of breath, wheezing or cough, Disp: 18 g, Rfl: 5    amLODIPine (NORVASC) 10 MG tablet, Take 1 tablet (10 mg) by mouth daily., Disp: 90 tablet, Rfl: 3    " budesonide-formoterol (SYMBICORT) 160-4.5 MCG/ACT Inhaler, Inhale 2 puffs twice daily plus 1-2 puffs as needed. May use up to 12 puffs per day., Disp: 20.4 g, Rfl: 5    cholecalciferol, vitamin D3, 1,000 unit (25 mcg) tablet, Take 1,000 Units by mouth daily, Disp: , Rfl:     cyclobenzaprine (FLEXERIL) 10 MG tablet, Take 0.5-1 tablets (5-10 mg) by mouth 3 times daily as needed for muscle spasms, Disp: 30 tablet, Rfl: 0    fexofenadine (ALLEGRA) 180 MG tablet, Take 180 mg by mouth daily, Disp: , Rfl:     fish oil-omega-3 fatty acids 1000 MG capsule, Take 2 g by mouth daily, Disp: , Rfl:     FLUoxetine (PROZAC) 10 MG capsule, Take 1 capsule (10 mg) by mouth daily., Disp: 90 capsule, Rfl: 1    hydrOXYzine HCl (ATARAX) 25 MG tablet, Take 1 tablet (25 mg) by mouth every 8 hours as needed for itching (Patient not taking: Reported on 10/29/2024), Disp: 30 tablet, Rfl: 0    lisinopril (ZESTRIL) 40 MG tablet, Take 1 tablet (40 mg) by mouth daily, Disp: 90 tablet, Rfl: 3    magnesium 250 MG tablet, Take 1 tablet by mouth daily (Patient not taking: Reported on 10/29/2024), Disp: , Rfl:     vitamin B complex with vitamin C (VITAMIN  B COMPLEX) tablet, Take 1 tablet by mouth daily, Disp: , Rfl:   Allergies   Allergen Reactions    Morphine Hives    Sulfa (Sulfonamide Antibiotics) [Sulfa Antibiotics] Hives       Family History   Problem Relation Age of Onset    Dementia Mother     Breast Cancer Mother     Lung Cancer Father        EXAM:   There were no vitals taken for this visit.    Physical Exam  Constitutional:       General: She is not in acute distress.     Appearance: Normal appearance. She is not ill-appearing.   HENT:      Nose: Congestion and rhinorrhea present. No nasal deformity or septal deviation.      Right Turbinates: Not enlarged, swollen or pale.      Left Turbinates: Not enlarged, swollen or pale.      Mouth/Throat:      Mouth: Mucous membranes are moist.      Pharynx: Oropharynx is clear. Uvula midline. No  posterior oropharyngeal erythema.      Tonsils: 0 on the right. 0 on the left.   Eyes:      General: No allergic shiner.        Right eye: No discharge.         Left eye: No discharge.      Conjunctiva/sclera: Conjunctivae normal.   Cardiovascular:      Rate and Rhythm: Normal rate and regular rhythm.      Heart sounds: Normal heart sounds, S1 normal and S2 normal.   Pulmonary:      Effort: Pulmonary effort is normal. No prolonged expiration.      Breath sounds: Normal breath sounds. No decreased air movement. No wheezing.   Neurological:      Mental Status: She is alert.   Psychiatric:         Mood and Affect: Mood normal.         Behavior: Behavior normal.         Judgment: Judgment normal.         WORKUP: None, we discussed repeating spirometry however patient is ill currently.    ASSESSMENT/PLAN:  Vanessa Franco is a 64 year old female with congestion, mild persistent asthma, allergic rhinitis, tree pollen and weed pollen allergy.    Mild persistent asthma  Patient reports she has had asthma-like symptoms for the past 2 weeks while she has been sick, day and nighttime.  She has only been treating herself as needed when she wakes up coughing during the nighttime.  We discussed that with illnesses that this is significant trigger, she could return to her previous treatment plan of 2 puffs, twice daily to alleviate her symptoms as she has been under treating her asthma and is still experiencing symptoms that she is not treating.  We discussed that there is no concern if she needs to increase inhaler use up to 12 puffs daily as needed.  Patient is more comfortable increasing to 2 puffs, twice daily with illnesses to try and prevent asthma exacerbations.  We did discuss she could continue to use up to 12 puffs daily while using this treatment plan.  We discussed increasing her treatment plan at the onset of illness, until illness has resolved.  We discussed that there are other weaning options for inhalers,  however with current illness it is not a good time to adjust these medications.     My Control Medicine: Symbicort         Dose: 160-4.5 mcg - 1 puffs as needed     My Rescue Medicine: Symbicort         Dose: 160-4.5 mcg, 1-2 puffs as needed     With onset of Illness, increase to 2 puffs twice daily.     May take up to 12 puffs daily    My Asthma Severity: Mild Persistent  Avoid your asthma triggers: smoke, upper respiratory infections, dust mites, pollens, animal dander, humidity, exercise or sports, emotions, and cold air     Congestion  Patient reports she had tried fluticasone as needed with little improvement of her nasal congestion.  We discussed alternative for nasal sprays to use with illness/environmental allergies.  For colds/congestion - Afrin - please dont use more than 3 days in a row.  We discussed potential side effect of rhinitis medicamentosa.    As needed nasal spray - (antihistamines nasal spray)- azelastine nasal spray, 1-2 sprays in each nostril twice daily as needed. - safe to use daily if you need to     Follow-up in approximately 6 months.  During this appointment we can consider repeat spirometry evaluation, and adjusting current ICS dose on Symbicort inhaler.     Thank you for allowing me to participate in the care of Vanessa Franco.    I spent 25 minutes on the date of the encounter doing chart review, history and exam, documentation and further coordination as noted above exclusive of separately reported interpretations.       Please note that this note consists of symbols derived from keyboarding, dictation and/or voice recognition software. As a result, there may be errors in the script that have gone undetected. Please consider this when interpreting information found in this chart.     Heaven Lombardo PA-C  [facility]

## 2024-12-19 NOTE — LETTER
"2024      Vanessa Franco  [address]  [place] [address]      Dear Colleague,    Thank you for referring your patient, Vanessa Franco, to the [facility]. Please see a copy of my visit note below.    Vanessa Franco was seen in the Allergy Clinic at [facility].    Vanessa Franco is a 64 year old female  being seen today for ongoing evaluation of mild persistent asthma, allergic rhinitis, tree pollen and weed pollen allergy.    History of Present Illness:     Patient reports since her last appointment with Dr. Tate 2024 she was feeling significantly better.    She reports she had increasing hunger and felt like she was overtreating her asthma, had decreased her inhaler use to 1 puff once daily and has continued to feel great until approximately 2 weeks ago.  She reports in the last 2 weeks she has had asthma symptoms, and has been using her Symbicort rescue inhaler approximately every other day.  She reports with this she has had a few nocturnal awakenings.  She reports that with this illness she does have some daytime asthma symptoms, which she has not been treating.  With his illness she reports she has also had significant congestion.    Denies any concerns about environmental allergies.     Video-Visit Details    Past Medical History:   Diagnosis Date     Anxiety      Chronic urticaria 2020     Environmental allergies      History of cataract surgery     no anestesia issue - BL removed     Hypertension      Mild major depression (H) 2021    no more depression after stopping atenolol       Past Surgical History:   Procedure Laterality Date     ANKLE FRACTURE SURGERY       BREAST SURGERY        SECTION       HYSTERECTOMY, PAP NO LONGER INDICATED      \"She does not need to have Pap smear\"     MAMMOPLASTY REDUCTION Bilateral 1993         Current Outpatient Medications:      albuterol (PROAIR HFA/PROVENTIL HFA/VENTOLIN " HFA) 108 (90 Base) MCG/ACT inhaler, Inhale 2 puffs into the lungs every 6 hours as needed for shortness of breath, wheezing or cough, Disp: 18 g, Rfl: 5     amLODIPine (NORVASC) 10 MG tablet, Take 1 tablet (10 mg) by mouth daily., Disp: 90 tablet, Rfl: 3     budesonide-formoterol (SYMBICORT) 160-4.5 MCG/ACT Inhaler, Inhale 2 puffs twice daily plus 1-2 puffs as needed. May use up to 12 puffs per day., Disp: 20.4 g, Rfl: 5     cholecalciferol, vitamin D3, 1,000 unit (25 mcg) tablet, Take 1,000 Units by mouth daily, Disp: , Rfl:      cyclobenzaprine (FLEXERIL) 10 MG tablet, Take 0.5-1 tablets (5-10 mg) by mouth 3 times daily as needed for muscle spasms, Disp: 30 tablet, Rfl: 0     fexofenadine (ALLEGRA) 180 MG tablet, Take 180 mg by mouth daily, Disp: , Rfl:      fish oil-omega-3 fatty acids 1000 MG capsule, Take 2 g by mouth daily, Disp: , Rfl:      FLUoxetine (PROZAC) 10 MG capsule, Take 1 capsule (10 mg) by mouth daily., Disp: 90 capsule, Rfl: 1     hydrOXYzine HCl (ATARAX) 25 MG tablet, Take 1 tablet (25 mg) by mouth every 8 hours as needed for itching (Patient not taking: Reported on 10/29/2024), Disp: 30 tablet, Rfl: 0     lisinopril (ZESTRIL) 40 MG tablet, Take 1 tablet (40 mg) by mouth daily, Disp: 90 tablet, Rfl: 3     magnesium 250 MG tablet, Take 1 tablet by mouth daily (Patient not taking: Reported on 10/29/2024), Disp: , Rfl:      vitamin B complex with vitamin C (VITAMIN  B COMPLEX) tablet, Take 1 tablet by mouth daily, Disp: , Rfl:   Allergies   Allergen Reactions     Morphine Hives     Sulfa (Sulfonamide Antibiotics) [Sulfa Antibiotics] Hives       Family History   Problem Relation Age of Onset     Dementia Mother      Breast Cancer Mother      Lung Cancer Father        EXAM:   There were no vitals taken for this visit.    Physical Exam  Constitutional:       General: She is not in acute distress.     Appearance: Normal appearance. She is not ill-appearing.   HENT:      Nose: Congestion and rhinorrhea  present. No nasal deformity or septal deviation.      Right Turbinates: Not enlarged, swollen or pale.      Left Turbinates: Not enlarged, swollen or pale.      Mouth/Throat:      Mouth: Mucous membranes are moist.      Pharynx: Oropharynx is clear. Uvula midline. No posterior oropharyngeal erythema.      Tonsils: 0 on the right. 0 on the left.   Eyes:      General: No allergic shiner.        Right eye: No discharge.         Left eye: No discharge.      Conjunctiva/sclera: Conjunctivae normal.   Cardiovascular:      Rate and Rhythm: Normal rate and regular rhythm.      Heart sounds: Normal heart sounds, S1 normal and S2 normal.   Pulmonary:      Effort: Pulmonary effort is normal. No prolonged expiration.      Breath sounds: Normal breath sounds. No decreased air movement. No wheezing.   Neurological:      Mental Status: She is alert.   Psychiatric:         Mood and Affect: Mood normal.         Behavior: Behavior normal.         Judgment: Judgment normal.         WORKUP: None, we discussed repeating spirometry however patient is ill currently.    ASSESSMENT/PLAN:  Vanessa Franco is a 64 year old female with congestion, mild persistent asthma, allergic rhinitis, tree pollen and weed pollen allergy.    Mild persistent asthma  Patient reports she has had asthma-like symptoms for the past 2 weeks while she has been sick, day and nighttime.  She has only been treating herself as needed when she wakes up coughing during the nighttime.  We discussed that with illnesses that this is significant trigger, she could return to her previous treatment plan of 2 puffs, twice daily to alleviate her symptoms as she has been under treating her asthma and is still experiencing symptoms that she is not treating.  We discussed that there is no concern if she needs to increase inhaler use up to 12 puffs daily as needed.  Patient is more comfortable increasing to 2 puffs, twice daily with illnesses to try and prevent asthma  exacerbations.  We did discuss she could continue to use up to 12 puffs daily while using this treatment plan.  We discussed increasing her treatment plan at the onset of illness, until illness has resolved.  We discussed that there are other weaning options for inhalers, however with current illness it is not a good time to adjust these medications.     My Control Medicine: Symbicort         Dose: 160-4.5 mcg - 1 puffs as needed     My Rescue Medicine: Symbicort         Dose: 160-4.5 mcg, 1-2 puffs as needed     With onset of Illness, increase to 2 puffs twice daily.     May take up to 12 puffs daily    My Asthma Severity: Mild Persistent  Avoid your asthma triggers: smoke, upper respiratory infections, dust mites, pollens, animal dander, humidity, exercise or sports, emotions, and cold air     Congestion  Patient reports she had tried fluticasone as needed with little improvement of her nasal congestion.  We discussed alternative for nasal sprays to use with illness/environmental allergies.  For colds/congestion - Afrin - please dont use more than 3 days in a row.  We discussed potential side effect of rhinitis medicamentosa.    As needed nasal spray - (antihistamines nasal spray)- azelastine nasal spray, 1-2 sprays in each nostril twice daily as needed. - safe to use daily if you need to     Follow-up in approximately 6 months.  During this appointment we can consider repeat spirometry evaluation, and adjusting current ICS dose on Symbicort inhaler.     Thank you for allowing me to participate in the care of Vanessa Franco.    I spent 25 minutes on the date of the encounter doing chart review, history and exam, documentation and further coordination as noted above exclusive of separately reported interpretations.       Please note that this note consists of symbols derived from keyboarding, dictation and/or voice recognition software. As a result, there may be errors in the script that have gone undetected.  Please consider this when interpreting information found in this chart.     Heaven Lombardo PA-C  [facility]      Again, thank you for allowing me to participate in the care of your patient.        Sincerely,        Heaven Lombardo PA-C

## 2025-01-14 ENCOUNTER — TELEPHONE (OUTPATIENT)
Dept: SURGERY | Facility: CLINIC | Age: 65
End: 2025-01-14

## 2025-01-14 NOTE — CONFIDENTIAL NOTE
Discussed with Vanessa her symptoms post hemorrhoid banding have returned. She would like to follow up for another banding in clinic.. scheduled her on 1/28 at 3pm.

## 2025-01-14 NOTE — TELEPHONE ENCOUNTER
[facility] Call Center    Phone Message    May a detailed message be left on voicemail: yes     Reason for Call: Symptoms or Concerns     If patient has red-flag symptoms, warm transfer to triage line    Current symptom or concern: Severe anal pain and rectal bleeding    Symptoms have been present for:  1.5 week(s)    Has patient previously been seen for this? Yes    By: Dr. Tyler    Date: 1-1-25    Are there any new or worsening symptoms? Yes: Bleeding more    Action Taken: Message routed to:  [facility] CRS    Travel Screening: Not Applicable     Date of Service:

## 2025-01-18 ENCOUNTER — HEALTH MAINTENANCE LETTER (OUTPATIENT)
Age: 65
End: 2025-01-18

## 2025-05-06 ENCOUNTER — TELEPHONE (OUTPATIENT)
Dept: SURGERY | Facility: CLINIC | Age: 65
End: 2025-05-06
Payer: MEDICARE

## 2025-05-06 NOTE — TELEPHONE ENCOUNTER
Left Voicemail (1st Attempt) and Sent Mychart (1st Attempt) for the patient to call back and schedule the following:    Appointment type: Ret Patient   Provider:   Return date: 5/27/25  Specialty phone number: [phone]  Additional appointment(s) needed: n/a  Additonal Notes: Provider unavailable, pt needs to reschedule

## 2025-05-07 ENCOUNTER — TELEPHONE (OUTPATIENT)
Dept: ALLERGY | Facility: CLINIC | Age: 65
End: 2025-05-07
Payer: MEDICARE

## 2025-05-07 ENCOUNTER — TELEPHONE (OUTPATIENT)
Dept: SURGERY | Facility: CLINIC | Age: 65
End: 2025-05-07
Payer: MEDICARE

## 2025-05-07 NOTE — TELEPHONE ENCOUNTER
Patient confirmed scheduled appointment:  Date: 6/10/25  Time: 215 pm   Visit type: Post Op   Provider:    Location: [facility]   Testing/imaging: n/a  Additional notes: Pt r/s from 5/27/25, had to push appt out due to insurance coverage

## 2025-05-15 ENCOUNTER — OFFICE VISIT (OUTPATIENT)
Dept: FAMILY MEDICINE | Facility: CLINIC | Age: 65
End: 2025-05-15
Payer: MEDICARE

## 2025-05-15 VITALS
WEIGHT: 204.7 LBS | HEART RATE: 82 BPM | TEMPERATURE: 98.4 F | HEIGHT: 66 IN | BODY MASS INDEX: 32.9 KG/M2 | DIASTOLIC BLOOD PRESSURE: 81 MMHG | RESPIRATION RATE: 16 BRPM | SYSTOLIC BLOOD PRESSURE: 130 MMHG | OXYGEN SATURATION: 96 %

## 2025-05-15 DIAGNOSIS — F33.1 MODERATE EPISODE OF RECURRENT MAJOR DEPRESSIVE DISORDER (H): ICD-10-CM

## 2025-05-15 DIAGNOSIS — I10 ESSENTIAL HYPERTENSION, BENIGN: Primary | ICD-10-CM

## 2025-05-15 DIAGNOSIS — R20.2 NUMBNESS AND TINGLING IN LEFT ARM: ICD-10-CM

## 2025-05-15 DIAGNOSIS — R20.0 NUMBNESS AND TINGLING IN LEFT ARM: ICD-10-CM

## 2025-05-15 RX ORDER — HYDROCHLOROTHIAZIDE 25 MG/1
25 TABLET ORAL DAILY
Qty: 30 TABLET | Refills: 3 | Status: SHIPPED | OUTPATIENT
Start: 2025-05-15

## 2025-05-15 RX ORDER — LATANOPROST 50 UG/ML
SOLUTION/ DROPS OPHTHALMIC
COMMUNITY
Start: 2024-12-13
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
## 2025-05-15 NOTE — PATIENT INSTRUCTIONS
Stop amlodipine  Start hydrochlorothiazide  Follow up in 1 month for blood pressure/physical    X-ray at [facility]  MRI should call to schedule  Spine referral, schedule just in case we see something on the MRI

## 2025-05-18 ENCOUNTER — HEALTH MAINTENANCE LETTER (OUTPATIENT)
Age: 65
End: 2025-05-18

## 2025-05-19 ENCOUNTER — HOSPITAL ENCOUNTER (OUTPATIENT)
Dept: MRI IMAGING | Facility: CLINIC | Age: 65
Discharge: HOME | End: 2025-05-19
Attending: FAMILY MEDICINE
Payer: MEDICARE

## 2025-05-19 ENCOUNTER — HOSPITAL ENCOUNTER (OUTPATIENT)
Dept: RADIOLOGY | Facility: CLINIC | Age: 65
Discharge: HOME | End: 2025-05-19
Attending: FAMILY MEDICINE
Payer: MEDICARE

## 2025-05-19 ENCOUNTER — TELEPHONE (OUTPATIENT)
Dept: FAMILY MEDICINE | Facility: CLINIC | Age: 65
End: 2025-05-19

## 2025-05-19 ENCOUNTER — RESULTS FOLLOW-UP (OUTPATIENT)
Dept: FAMILY MEDICINE | Facility: CLINIC | Age: 65
End: 2025-05-19

## 2025-05-19 DIAGNOSIS — R20.2 NUMBNESS AND TINGLING IN LEFT ARM: ICD-10-CM

## 2025-05-19 DIAGNOSIS — R20.0 NUMBNESS AND TINGLING IN LEFT ARM: Primary | ICD-10-CM

## 2025-05-19 DIAGNOSIS — M48.02 CERVICAL STENOSIS OF SPINAL CANAL: ICD-10-CM

## 2025-05-19 DIAGNOSIS — R20.0 NUMBNESS AND TINGLING IN LEFT ARM: ICD-10-CM

## 2025-05-19 DIAGNOSIS — R20.2 NUMBNESS AND TINGLING IN LEFT ARM: Primary | ICD-10-CM

## 2025-05-19 PROCEDURE — 72040 X-RAY EXAM NECK SPINE 2-3 VW: CPT

## 2025-05-19 PROCEDURE — 72141 MRI NECK SPINE W/O DYE: CPT

## 2025-05-19 NOTE — TELEPHONE ENCOUNTER
Spine referral placed.  Patient should not see the chiropractor and do adjustments, as it could aggravate her condition.    Goran Faustin MD

## 2025-05-19 NOTE — TELEPHONE ENCOUNTER
RN called pt and relayed message from provider.    Pt is agreeable to having a referral placed. Pt wondering if she can still see her chiropractor?    Referral pended for review.    HARSHAL Lewis  [facility]

## 2025-05-19 NOTE — TELEPHONE ENCOUNTER
Please notify patient that we received her x-ray and MRI of her cervical spine.  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  This could be causing her numbness and tingling in her left arm.  We should have her see a spine specialist.  Please let me know if patient is agreeable with a referral.    Goran Faustin MD

## 2025-05-19 NOTE — TELEPHONE ENCOUNTER
RN called and relayed provider's message. No further questions at this time.    HARSHAL Lewis  [facility]

## 2025-05-28 NOTE — PROGRESS NOTES
Colon and Rectal Surgery Clinic Note    RE: Vanessa Franco.  : 1960.  DARRYL: 6/10/2025.    Reason for visit: hemorrhoid banding.    HPI: Vanessa Franco is a 64 year old female who presents today for hemorrhoid banding. She has a past medical history of anxiety, chronic urticaria, HTN, and depression. On 2024 she underwent a screening colonoscopy which demonstrated a 2mm polyp in the cecum (clinically was a polyp but pathology with normal mucosa), two 2mm inflammatory polyps in the rectum and a visible rectal prolapse during exam. Xray defecography on 10/21/2024 demonstrated moderate rectal descent at rest, which increases during defecation. No evidence of intussusception or prolapse.     In 2025 I banded one hemorrhoid in the right posterior location.     Interval History: Doing much better, less bleeding. She is working on losing weight and staying hydrated.     Colonoscopy (2024):       Surgical Pathology (2024):       Xray defecography (10/21/2024):  IMPRESSION:  Moderate rectal descent at rest, which increases during defecation.  No evidence of intussusception or prolapse.   Complete evacuation of contrast. Rectal incontinence with straining.     Medications:  Current Outpatient Medications   Medication Sig Dispense Refill    albuterol (PROAIR HFA/PROVENTIL HFA/VENTOLIN HFA) 108 (90 Base) MCG/ACT inhaler Inhale 2 puffs into the lungs every 6 hours as needed for shortness of breath, wheezing or cough 18 g 5    azelastine (ASTELIN) 0.1 % nasal spray Spray 1-2 sprays into both nostrils 2 times daily. 30 mL 3    budesonide-formoterol (SYMBICORT) 160-4.5 MCG/ACT Inhaler Inhale 2 puffs twice daily plus 1-2 puffs as needed. May use up to 12 puffs per day. 20.4 g 5    cyclobenzaprine (FLEXERIL) 10 MG tablet Take 0.5-1 tablets (5-10 mg) by mouth 3 times daily as needed for muscle spasms 30 tablet 0    fexofenadine (ALLEGRA) 180 MG tablet Take 180 mg by mouth daily      FLUoxetine  "(PROZAC) 10 MG capsule Take 1 capsule (10 mg) by mouth daily. 90 capsule 1    hydrochlorothiazide (HYDRODIURIL) 25 MG tablet Take 1 tablet (25 mg) by mouth daily. 30 tablet 3    hydrOXYzine HCl (ATARAX) 25 MG tablet Take 1 tablet (25 mg) by mouth every 8 hours as needed for itching 30 tablet 0    ibuprofen (ADVIL/MOTRIN) 200 MG capsule Take 400 mg by mouth every 6 hours as needed for fever.      latanoprost (XALATAN) 0.005 % ophthalmic solution INSTILL 1 DROP IN LEFT EYE EVERY DAY AT BEDTIME      lisinopril (ZESTRIL) 40 MG tablet Take 1 tablet (40 mg) by mouth daily 90 tablet 3    methylPREDNISolone (MEDROL DOSEPAK) 4 MG tablet therapy pack Follow Package Directions 21 tablet 0    vitamin B complex with vitamin C (VITAMIN  B COMPLEX) tablet Take 1 tablet by mouth daily         Allergies:  Allergies   Allergen Reactions    Morphine Hives    Sulfa (Sulfonamide Antibiotics) [Sulfa Antibiotics] Hives       Social history:   Social History     Tobacco Use    Smoking status: Never     Passive exposure: Never    Smokeless tobacco: Never   Substance Use Topics    Alcohol use: Yes     Alcohol/week: 1.0 standard drink of alcohol     Comment: Alcoholic Drinks/day: Yearly     Marital status: .    ROS:  A complete review of systems was performed with the patient and all systems negative except as per HPI.    Physical Examination:  Exam was chaperoned by SUMAYA Lee  BP (!) 144/96 (BP Location: Left arm, Patient Position: Sitting, Cuff Size: Adult Large)   Pulse 84   Ht 1.689 m (5' 6.5\")   Wt 92.1 kg (203 lb 1.6 oz)   SpO2 97%   BMI 32.29 kg/m    General: Well hydrated. No acute distress.  Abdomen: Soft, NT.  Perianal external examination:  Perianal skin: intact.  Lesions: No.  Eversion of buttocks: There was not evidence of an anal fissure. Details: N/A.  Skin tags or external hemorrhoids: No.  Digital rectal examination: Was performed.   Sphincter tone: Good.  Palpable lesions: No.  Other: " None.    Procedures:  After discussing the risks and benefits, the patient agreed to proceed with internal hemorrhoidal banding.    Prior to the start of the procedure and with procedural staff participation, I verbally confirmed the patient s identity using two indicators, relevant allergies, that the procedure was appropriate and matched the consent or emergent situation, and that the correct equipment/implants were available. Immediately prior to starting the procedure I conducted the Time Out with the procedural staff and re-confirmed the patient s name, procedure, and site/side. (The Joint Commission universal protocol was followed.)  Yes    Sedation (Moderate or Deep): None    A suction hemorrhoidal  was used to place a total of 2 band(s) in the left lateral and right anterior position(s).    There was no significant bleeding. The patient tolerated the procedure well.    ASSESSMENT     This is a 64 year old F who presents with grade II internal hemorrhoids. Discussed managing these with hemorrhoidal banding. Discussed that several banding procedures may be needed and that this will not necessarily resolve the external hemorrhoidal skin tags. Patient states an understanding of this and states an understanding that there is a small risk of bleeding today and when the band falls off in 1-2 weeks. Also advised patient that there is a remote chance of infection. Recommended avoiding heavy lifting and remain off aspirin for two weeks. Patient verbalized an understanding of these risks and wished to proceed today. All questions were answered.      All pertinent labs and imaging were personally reviewed by me.    PLAN  1) I performed hemorrhoidal banding today  2) You may return in 8 weeks for another banding if symptoms continue.  3) There is a small risk of bleeding and remote chance of infection. Contact us in the interim if there are any concerns.   4) Start a daily fiber supplement such as Citrucel or  Metamucil POWDER. Start with 2 tablespoons daily and slowly increase up to three times a day, if needed, over the next 4-6 weeks. If you are taking the fiber supplement three times a day that means you are taking 2 tablespoons three times a day (total 6 tablespoons daily).    5) Drink 8-10 glasses of water with this  6) Can additionally us stool softeners or a laxative such as Miralax to keep stools soft  7) No heavy lifting or aspirin use for 2 weeks     - Healthy life style and weight loss were encouraged      30 minutes spent on the date of the encounter doing chart review, history and exam, imaging review, documentation and further activities as noted above, excluding the procedure.      Wong Tyler MD, FACS, FASCRS, FSSO    Division of Colon and Rectal Surgery  [facility]    Referring Provider:  Goran Faustin MD  [address] [place] [address]     Primary Care Provider:  Goran Faustin

## 2025-06-10 ENCOUNTER — TELEPHONE (OUTPATIENT)
Dept: PHYSICAL MEDICINE AND REHAB | Facility: CLINIC | Age: 65
End: 2025-06-10

## 2025-06-10 ENCOUNTER — OFFICE VISIT (OUTPATIENT)
Dept: SURGERY | Facility: CLINIC | Age: 65
End: 2025-06-10
Payer: COMMERCIAL

## 2025-06-10 VITALS
SYSTOLIC BLOOD PRESSURE: 144 MMHG | HEIGHT: 67 IN | WEIGHT: 203.1 LBS | DIASTOLIC BLOOD PRESSURE: 96 MMHG | OXYGEN SATURATION: 97 % | BODY MASS INDEX: 31.88 KG/M2 | HEART RATE: 84 BPM

## 2025-06-10 DIAGNOSIS — K64.1 GRADE II HEMORRHOIDS: Primary | ICD-10-CM

## 2025-06-10 DIAGNOSIS — E66.811 CLASS 1 OBESITY WITH SERIOUS COMORBIDITY AND BODY MASS INDEX (BMI) OF 31.0 TO 31.9 IN ADULT, UNSPECIFIED OBESITY TYPE: ICD-10-CM

## 2025-06-10 PROCEDURE — 3077F SYST BP >= 140 MM HG: CPT | Performed by: SURGERY

## 2025-06-10 PROCEDURE — 46221 LIGATION OF HEMORRHOID(S): CPT | Performed by: SURGERY

## 2025-06-10 PROCEDURE — 3080F DIAST BP >= 90 MM HG: CPT | Performed by: SURGERY

## 2025-06-10 PROCEDURE — 99214 OFFICE O/P EST MOD 30 MIN: CPT | Mod: 25 | Performed by: SURGERY

## 2025-06-10 PROCEDURE — 1125F AMNT PAIN NOTED PAIN PRSNT: CPT | Performed by: SURGERY

## 2025-06-10 ASSESSMENT — PAIN SCALES - GENERAL: PAINLEVEL_OUTOF10: SEVERE PAIN (8)

## 2025-06-10 NOTE — TELEPHONE ENCOUNTER
Phoned the patient and left VM. Stated to call the pain clinic to schedule injection procedure at [phone].

## 2025-06-10 NOTE — NURSING NOTE
"Chief Complaint   Patient presents with    Follow Up     banding       Vitals:    06/10/25 1403   BP: (!) 144/96   BP Location: Left arm   Patient Position: Sitting   Cuff Size: Adult Large   Pulse: 84   SpO2: 97%   Weight: 203 lb 1.6 oz   Height: 5' 6.5\"       Body mass index is 32.29 kg/m .    Kristy Alcantara, EMT  "

## 2025-06-10 NOTE — LETTER
6/10/2025       RE: Vanessa Franco  [address]  [facility] [address]     Dear Colleague,    Thank you for referring your patient, Vanessa Franco, to the [facility] COLON AND RECTAL SURGERY CLINIC [place] [facility]. Please see a copy of my visit note below.    Colon and Rectal Surgery Clinic Note    RE: Vanessa Franco.  : 1960.  DARRYL: 6/10/2025.    Reason for visit: hemorrhoid banding.    HPI: Vanessa Franco is a 64 year old female who presents today for hemorrhoid banding. She has a past medical history of anxiety, chronic urticaria, HTN, and depression. On 2024 she underwent a screening colonoscopy which demonstrated a 2mm polyp in the cecum (clinically was a polyp but pathology with normal mucosa), two 2mm inflammatory polyps in the rectum and a visible rectal prolapse during exam. Xray defecography on 10/21/2024 demonstrated moderate rectal descent at rest, which increases during defecation. No evidence of intussusception or prolapse.     In 2025 I banded one hemorrhoid in the right posterior location.     Interval History: Doing much better, less bleeding. She is working on losing weight and staying hydrated.     Colonoscopy (2024):       Surgical Pathology (2024):       Xray defecography (10/21/2024):  IMPRESSION:  Moderate rectal descent at rest, which increases during defecation.  No evidence of intussusception or prolapse.   Complete evacuation of contrast. Rectal incontinence with straining.     Medications:  Current Outpatient Medications   Medication Sig Dispense Refill     albuterol (PROAIR HFA/PROVENTIL HFA/VENTOLIN HFA) 108 (90 Base) MCG/ACT inhaler Inhale 2 puffs into the lungs every 6 hours as needed for shortness of breath, wheezing or cough 18 g 5     azelastine (ASTELIN) 0.1 % nasal spray Spray 1-2 sprays into both nostrils 2 times daily. 30 mL 3     budesonide-formoterol (SYMBICORT)  "160-4.5 MCG/ACT Inhaler Inhale 2 puffs twice daily plus 1-2 puffs as needed. May use up to 12 puffs per day. 20.4 g 5     cyclobenzaprine (FLEXERIL) 10 MG tablet Take 0.5-1 tablets (5-10 mg) by mouth 3 times daily as needed for muscle spasms 30 tablet 0     fexofenadine (ALLEGRA) 180 MG tablet Take 180 mg by mouth daily       FLUoxetine (PROZAC) 10 MG capsule Take 1 capsule (10 mg) by mouth daily. 90 capsule 1     hydrochlorothiazide (HYDRODIURIL) 25 MG tablet Take 1 tablet (25 mg) by mouth daily. 30 tablet 3     hydrOXYzine HCl (ATARAX) 25 MG tablet Take 1 tablet (25 mg) by mouth every 8 hours as needed for itching 30 tablet 0     ibuprofen (ADVIL/MOTRIN) 200 MG capsule Take 400 mg by mouth every 6 hours as needed for fever.       latanoprost (XALATAN) 0.005 % ophthalmic solution INSTILL 1 DROP IN LEFT EYE EVERY DAY AT BEDTIME       lisinopril (ZESTRIL) 40 MG tablet Take 1 tablet (40 mg) by mouth daily 90 tablet 3     methylPREDNISolone (MEDROL DOSEPAK) 4 MG tablet therapy pack Follow Package Directions 21 tablet 0     vitamin B complex with vitamin C (VITAMIN  B COMPLEX) tablet Take 1 tablet by mouth daily         Allergies:  Allergies   Allergen Reactions     Morphine Hives     Sulfa (Sulfonamide Antibiotics) [Sulfa Antibiotics] Hives       Social history:   Social History     Tobacco Use     Smoking status: Never     Passive exposure: Never     Smokeless tobacco: Never   Substance Use Topics     Alcohol use: Yes     Alcohol/week: 1.0 standard drink of alcohol     Comment: Alcoholic Drinks/day: Yearly     Marital status: .    ROS:  A complete review of systems was performed with the patient and all systems negative except as per HPI.    Physical Examination:  Exam was chaperoned by SUMAYA Lee  BP (!) 144/96 (BP Location: Left arm, Patient Position: Sitting, Cuff Size: Adult Large)   Pulse 84   Ht 1.689 m (5' 6.5\")   Wt 92.1 kg (203 lb 1.6 oz)   SpO2 97%   BMI 32.29 kg/m    General: Well " hydrated. No acute distress.  Abdomen: Soft, NT.  Perianal external examination:  Perianal skin: intact.  Lesions: No.  Eversion of buttocks: There was not evidence of an anal fissure. Details: N/A.  Skin tags or external hemorrhoids: No.  Digital rectal examination: Was performed.   Sphincter tone: Good.  Palpable lesions: No.  Other: None.    Procedures:  After discussing the risks and benefits, the patient agreed to proceed with internal hemorrhoidal banding.    Prior to the start of the procedure and with procedural staff participation, I verbally confirmed the patient s identity using two indicators, relevant allergies, that the procedure was appropriate and matched the consent or emergent situation, and that the correct equipment/implants were available. Immediately prior to starting the procedure I conducted the Time Out with the procedural staff and re-confirmed the patient s name, procedure, and site/side. (The Joint Commission universal protocol was followed.)  Yes    Sedation (Moderate or Deep): None    A suction hemorrhoidal  was used to place a total of 2 band(s) in the left lateral and right anterior position(s).    There was no significant bleeding. The patient tolerated the procedure well.    ASSESSMENT     This is a 64 year old F who presents with grade II internal hemorrhoids. Discussed managing these with hemorrhoidal banding. Discussed that several banding procedures may be needed and that this will not necessarily resolve the external hemorrhoidal skin tags. Patient states an understanding of this and states an understanding that there is a small risk of bleeding today and when the band falls off in 1-2 weeks. Also advised patient that there is a remote chance of infection. Recommended avoiding heavy lifting and remain off aspirin for two weeks. Patient verbalized an understanding of these risks and wished to proceed today. All questions were answered.      All pertinent labs and imaging  were personally reviewed by me.    PLAN  1) I performed hemorrhoidal banding today  2) You may return in 8 weeks for another banding if symptoms continue.  3) There is a small risk of bleeding and remote chance of infection. Contact us in the interim if there are any concerns.   4) Start a daily fiber supplement such as Citrucel or Metamucil POWDER. Start with 2 tablespoons daily and slowly increase up to three times a day, if needed, over the next 4-6 weeks. If you are taking the fiber supplement three times a day that means you are taking 2 tablespoons three times a day (total 6 tablespoons daily).    5) Drink 8-10 glasses of water with this  6) Can additionally us stool softeners or a laxative such as Miralax to keep stools soft  7) No heavy lifting or aspirin use for 2 weeks     - Healthy life style and weight loss were encouraged      30 minutes spent on the date of the encounter doing chart review, history and exam, imaging review, documentation and further activities as noted above, excluding the procedure.      Wong Tyler MD, FACS, FASCRS, FSSO    Division of Colon and Rectal Surgery  [facility]    Referring Provider:  Goran Faustin MD  [address] [place] [address]     Primary Care Provider:  Goran Faustin      Again, thank you for allowing me to participate in the care of your patient.      Sincerely,    Wong Tyler MD

## 2025-06-10 NOTE — PATIENT INSTRUCTIONS
1. You may return in 8 weeks for another banding if symptoms continue.  2. There is a small risk of bleeding and remote chance of infection. Contact us in the interim if there are any concerns.   3. Start a daily fiber supplement such as Citrucel or Metamucil POWDER. Start with 2 tablespoons daily and slowly increase up to three times a day, if needed, over the next 4-6 weeks. If you are taking the fiber supplement three times a day that means you are taking 2 tablespoons three times a day (total 6 tablespoons daily).   4. Can additionally us stool softeners or a laxative such as Miralax to keep stools soft.  5. Drink 8-10 glasses of water daily.   6. No heavy lifting or aspirin use for 3 weeks.

## 2025-06-11 NOTE — TELEPHONE ENCOUNTER
Reason for call:  Other   Patient called regarding (reason for call): appointment and call back  Additional comments: Pt called back to schedule her procedure. Please call her back as soon as you are able.     Phone number to reach patient:  Home number on file [phone] (home)    Best Time:  Any     Can we leave a detailed message on this number?  YES    Monica Billy      [facility]  Pain [facility]

## 2025-06-17 ENCOUNTER — OFFICE VISIT (OUTPATIENT)
Dept: FAMILY MEDICINE | Facility: CLINIC | Age: 65
End: 2025-06-17
Payer: COMMERCIAL

## 2025-06-17 VITALS
HEART RATE: 91 BPM | BODY MASS INDEX: 31.22 KG/M2 | OXYGEN SATURATION: 97 % | TEMPERATURE: 97.4 F | HEIGHT: 67 IN | SYSTOLIC BLOOD PRESSURE: 121 MMHG | RESPIRATION RATE: 16 BRPM | DIASTOLIC BLOOD PRESSURE: 84 MMHG | WEIGHT: 198.9 LBS

## 2025-06-17 DIAGNOSIS — G89.29 CHRONIC PAIN OF RIGHT ANKLE: ICD-10-CM

## 2025-06-17 DIAGNOSIS — M54.12 CERVICAL RADICULOPATHY: ICD-10-CM

## 2025-06-17 DIAGNOSIS — Z23 IMMUNIZATION DUE: ICD-10-CM

## 2025-06-17 DIAGNOSIS — F33.1 MODERATE EPISODE OF RECURRENT MAJOR DEPRESSIVE DISORDER (H): ICD-10-CM

## 2025-06-17 DIAGNOSIS — I10 ESSENTIAL HYPERTENSION, BENIGN: Primary | ICD-10-CM

## 2025-06-17 DIAGNOSIS — Z78.0 POSTMENOPAUSAL STATUS: ICD-10-CM

## 2025-06-17 DIAGNOSIS — L50.8 CHRONIC URTICARIA: ICD-10-CM

## 2025-06-17 DIAGNOSIS — M25.571 CHRONIC PAIN OF RIGHT ANKLE: ICD-10-CM

## 2025-06-17 LAB
ANION GAP SERPL CALCULATED.3IONS-SCNC: 9 MMOL/L (ref 7–15)
BUN SERPL-MCNC: 19.2 MG/DL (ref 8–23)
CALCIUM SERPL-MCNC: 9.5 MG/DL (ref 8.8–10.4)
CHLORIDE SERPL-SCNC: 103 MMOL/L (ref 98–107)
CREAT SERPL-MCNC: 0.82 MG/DL (ref 0.51–0.95)
EGFRCR SERPLBLD CKD-EPI 2021: 79 ML/MIN/1.73M2
GLUCOSE SERPL-MCNC: 84 MG/DL (ref 70–99)
HCO3 SERPL-SCNC: 31 MMOL/L (ref 22–29)
POTASSIUM SERPL-SCNC: 3.6 MMOL/L (ref 3.4–5.3)
SODIUM SERPL-SCNC: 143 MMOL/L (ref 135–145)

## 2025-06-17 PROCEDURE — 91320 SARSCV2 VAC 30MCG TRS-SUC IM: CPT | Performed by: FAMILY MEDICINE

## 2025-06-17 PROCEDURE — 90480 ADMN SARSCOV2 VAC 1/ONLY CMP: CPT | Performed by: FAMILY MEDICINE

## 2025-06-17 PROCEDURE — 3079F DIAST BP 80-89 MM HG: CPT | Performed by: FAMILY MEDICINE

## 2025-06-17 PROCEDURE — 36415 COLL VENOUS BLD VENIPUNCTURE: CPT | Performed by: FAMILY MEDICINE

## 2025-06-17 PROCEDURE — G2211 COMPLEX E/M VISIT ADD ON: HCPCS | Performed by: FAMILY MEDICINE

## 2025-06-17 PROCEDURE — 99214 OFFICE O/P EST MOD 30 MIN: CPT | Mod: 25 | Performed by: FAMILY MEDICINE

## 2025-06-17 PROCEDURE — 3074F SYST BP LT 130 MM HG: CPT | Performed by: FAMILY MEDICINE

## 2025-06-17 PROCEDURE — 80048 BASIC METABOLIC PNL TOTAL CA: CPT | Performed by: FAMILY MEDICINE

## 2025-06-17 RX ORDER — HYDROXYZINE HYDROCHLORIDE 25 MG/1
25 TABLET, FILM COATED ORAL EVERY 8 HOURS PRN
Qty: 30 TABLET | Refills: 0 | Status: SHIPPED | OUTPATIENT
Start: 2025-06-17

## 2025-06-17 RX ORDER — HYDROCHLOROTHIAZIDE 25 MG/1
25 TABLET ORAL DAILY
Qty: 90 TABLET | Refills: 3 | Status: SHIPPED | OUTPATIENT
Start: 2025-06-17

## 2025-06-17 RX ORDER — LISINOPRIL 40 MG/1
40 TABLET ORAL DAILY
Qty: 90 TABLET | Refills: 3 | Status: SHIPPED | OUTPATIENT
Start: 2025-06-17

## 2025-06-17 RX ORDER — FLUOXETINE 10 MG/1
10 CAPSULE ORAL DAILY
Qty: 90 CAPSULE | Refills: 3 | Status: SHIPPED | OUTPATIENT
Start: 2025-06-17

## 2025-06-17 ASSESSMENT — ASTHMA QUESTIONNAIRES
ACT_TOTALSCORE: 22
ACT_TOTALSCORE: 22
QUESTION_3 LAST FOUR WEEKS HOW OFTEN DID YOUR ASTHMA SYMPTOMS (WHEEZING, COUGHING, SHORTNESS OF BREATH, CHEST TIGHTNESS OR PAIN) WAKE YOU UP AT NIGHT OR EARLIER THAN USUAL IN THE MORNING: NOT AT ALL
QUESTION_5 LAST FOUR WEEKS HOW WOULD YOU RATE YOUR ASTHMA CONTROL: COMPLETELY CONTROLLED
QUESTION_2 LAST FOUR WEEKS HOW OFTEN HAVE YOU HAD SHORTNESS OF BREATH: ONCE OR TWICE A WEEK
QUESTION_1 LAST FOUR WEEKS HOW MUCH OF THE TIME DID YOUR ASTHMA KEEP YOU FROM GETTING AS MUCH DONE AT WORK, SCHOOL OR AT HOME: NONE OF THE TIME
QUESTION_4 LAST FOUR WEEKS HOW OFTEN HAVE YOU USED YOUR RESCUE INHALER OR NEBULIZER MEDICATION (SUCH AS ALBUTEROL): TWO OR THREE TIMES PER WEEK

## 2025-06-17 ASSESSMENT — ENCOUNTER SYMPTOMS: HYPERTENSION: 1

## 2025-06-17 NOTE — PROGRESS NOTES
Assessment & Plan     Essential hypertension, benign:  - Blood pressure numbers are looking great. Current medication regimen includes hydrochlorothiazide 25 mg and lisinopril 40 mg daily.  - Perform blood check to ensure kidney function is compatible with current medications.    Chronic pain of right ankle:  - Pain in ankle affecting mobility.  - Extend handicap form.    Cervical radiculopathy:  - MRI shows nerve pinching due to stenosis, causing tingling in arms. Condition ranges from moderate to severe.  - Follow up with pain management specialists for treatment options, including injections, nerve ablation, or surgery.    Postmenopausal status:  - Order bone density test to assess risk of fractures.    Immunization due:  - Due for measles and COVID vaccines.  - Administer COVID vaccine today. Measles vaccine to be obtained at pharmacy due to insurance coverage issues.    Glaucoma:  - Blurry vision possibly related to eye drops.  - Follow up with eye doctor next week to check eye pressure and discuss alternative drops if current ones are not tolerated.  - Risks and side effects: Potential vision loss if pressure is not managed.    Consent was obtained from the patient to use an AI documentation tool in the creation of this note.      The longitudinal plan of care for the diagnosis(es)/condition(s) as documented were addressed during this visit. Due to the added complexity in care, I will continue to support Vanessa in the subsequent management and with ongoing continuity of care.          Subjective   Vanessa is a 65 year old, presenting for the following health issues:  Hypertension (Patient is here for a follow up. ), MRI Transcription (Patient would like to go over mri results. ), and Forms (Patient would like to extend handicap forms, and also get work restrictions. )        6/17/2025     6:59 AM   Additional Questions   Roomed by spencer velazco cma   Accompanied by self         6/17/2025   Forms   Any forms  needing to be completed Yes     Hypertension     History of Present Illness       Back Pain:  She presents for follow up of back pain. Patient's back pain is a chronic problem.  Location of back pain:  Left middle of back and left hip  Description of back pain: sharp  Back pain spreads: left buttocks    Since patient first noticed back pain, pain is: gradually worsening  Does back pain interfere with her job:  Yes       She eats 2-3 servings of fruits and vegetables daily.She consumes 1 sweetened beverage(s) daily.She exercises with enough effort to increase her heart rate 20 to 29 minutes per day.  She exercises with enough effort to increase her heart rate 3 or less days per week.   She is taking medications regularly.        History of Present Illness-  Vanessa Franco, 65 years    HTN  - We switch patient's blood pressure regimen from amlodipine 10 mg daily to hydrochlorothiazide 25 mg daily due to lower extremity edema.  Since switching her medication her lower extremity edema has resolved.  She states she is able to walk easier now.  - Patient has continued lisinopril 40 mg daily without any noticeable side effects.    Cervical Radiculopathy  - Tingling in the arms, likely due to nerve pinching in the neck  - MRI performed a week ago showed stenosis with nerve pinching  - Symptoms persist despite steroid treatment  - Severity ranges from moderate to severe in different areas    Ankle and Lower Back Pain  - Pain in the ankle and lower back affecting mobility  - Use of a cane for walking due to pain    Glaucoma  - Blurry vision possibly linked to glaucoma  - Previous use of eye drops, uncertain effectiveness  - Upcoming appointment with an eye doctor for pressure check                  Review of Systems  Constitutional, HEENT, cardiovascular, pulmonary, gi and gu systems are negative, except as otherwise noted.      Objective    /84 (BP Location: Left arm, Patient Position: Sitting, Cuff Size: Adult  "Regular)   Pulse 91   Temp 97.4  F (36.3  C) (Temporal)   Resp 16   Ht 1.689 m (5' 6.5\")   Wt 90.2 kg (198 lb 14.4 oz)   SpO2 97%   BMI 31.62 kg/m    Body mass index is 31.62 kg/m .  Physical Exam   GENERAL: alert and no distress  PSYCH: mentation appears normal, affect normal/bright  EXTREMITIES: no edema.            Signed Electronically by: Goran Faustin MD    "

## 2025-06-19 ENCOUNTER — TELEPHONE (OUTPATIENT)
Dept: PHYSICAL MEDICINE AND REHAB | Facility: CLINIC | Age: 65
End: 2025-06-19
Payer: COMMERCIAL

## 2025-06-19 ENCOUNTER — RESULTS FOLLOW-UP (OUTPATIENT)
Dept: FAMILY MEDICINE | Facility: CLINIC | Age: 65
End: 2025-06-19

## 2025-06-19 PROBLEM — M47.812 CERVICAL SPONDYLOSIS WITHOUT MYELOPATHY: Status: ACTIVE | Noted: 2025-06-06

## 2025-06-19 PROBLEM — R20.0 NUMBNESS AND TINGLING IN LEFT ARM: Status: ACTIVE | Noted: 2025-06-06

## 2025-06-19 PROBLEM — R20.2 NUMBNESS AND TINGLING IN LEFT ARM: Status: ACTIVE | Noted: 2025-06-06

## 2025-06-19 PROBLEM — M48.02 CERVICAL STENOSIS OF SPINAL CANAL: Status: ACTIVE | Noted: 2025-06-06

## 2025-06-19 NOTE — TELEPHONE ENCOUNTER
Called patient to schedule procedure with Dr. Garcia    Date of Procedure: 7/16/25    Arrival time given: Yes: Arrival Time 7:30am      Procedure Location: [facility], operated by [facility]     Verified Location with Patient:  Yes  Address provided to the patient    Pre-op H&P Required:  No: Local anesthesia       Post-Op/Follow Up Appt:  Yes: 7/31/25      Informed patient they will need a  to drive them home:  Yes    Patients : spouse     Patient is aware that pre-op RN from the procedure center will call 2-3 days prior to scheduled procedure to confirm arrival time and review any instructions:  Yes       Additional Comments: N/A        Kailey Anderson MA on 6/19/2025 at 2:18 PM      P: [phone]

## 2025-06-20 ENCOUNTER — TELEPHONE (OUTPATIENT)
Dept: PHYSICAL MEDICINE AND REHAB | Facility: CLINIC | Age: 65
End: 2025-06-20
Payer: COMMERCIAL

## 2025-06-20 NOTE — TELEPHONE ENCOUNTER
Called patient to review provider message:     ----- Message -----  From: Diana Townsend APRN CNP  Sent: 6/20/2025   8:15 AM CDT  To: Ann Anderson; Vicki Posey, RN; Pm&R N*  Subject: RE: Additional Information Request C7-T1 int*    Fredy Cohen,     Would you be able to please call Vanessa?     At our last visit I had given her a steroid pack (while awaiting scheduling for a steroid injection in July) because she was having quite severe pain.     The plan was to cancel the injection if she had significant improvement.     Can you please ask her if she is having notable improvement and wants to cancel the injection? Does she feel the pain level is tolerable to start PT?     If not, then I will add addendum to my note indicating she cannot tolerate PT due to pain and the IFEANYI is advised.     Thanks,  Diana    Patient states she has had improvement in the symptoms at night enough to allow her to sleep, but is still having pain and symptoms. She is agreeable to call and schedule PT tp see if she can tolerate PT and see if it helps her symptoms. Patient requested the PT scheduling information sent to her her by My chart message so she can call to get that scheduled and then will plan to update how she is doing or if it is too painful for her to complete PT. Patient also requested an update from Diana Townsend NP regarding the paperwork she gave to Diana Townsend NP to see if that would be getting sent to her work as requested previously.     Routed patient update and request to Diana Townsend NP as requested by patient and My Chart message sent to patient with PT scheduling phone number as well.     Vicki Posey, BSN RN  RN Care Coordinator for Physical Medicine and Rehabilitation  [facility] [address] [address]  Main clinic office: [phone]  Main clinic fax: [phone]

## 2025-06-24 ENCOUNTER — TELEPHONE (OUTPATIENT)
Dept: PHYSICAL MEDICINE AND REHAB | Facility: CLINIC | Age: 65
End: 2025-06-24

## 2025-06-24 NOTE — TELEPHONE ENCOUNTER
Patient confirmed scheduled appointment:  Date: 8/22/25  Time: 7am  Visit type: Return Med Spine  Provider: Diana Townsend  [place] [facility]  Testing/imaging: NA  Additional notes: 6 week follow up after starting PT    Ariane Almaraz on 6/24/2025 at 10:59 AM

## 2025-06-25 NOTE — TELEPHONE ENCOUNTER
----- Message -----  From: Diana Townsend APRN CNP  Sent: 6/24/2025   7:46 PM CDT  To: HARSHAL Chen, I was just given the [facility] paperwork today and completed it. Brittany Mroejon is calling the patient to ask her to come by to pick it up because one of the sections requires her signature.   I also requested a copy be scanned/uploaded to her chart.  Thanks!   Diana   ----- Message -----  From: Vicki Posey RN  Sent: 6/20/2025  12:24 PM CDT  To: RICARDO Rouse CNP    ----- Message from Vicki Posey RN sent at 6/20/2025 12:24 PM CDT -----    Fredy Ortiz,     Please see telephone encounter. Patient also requested to see if you could update her on the paperwork she gave you that she needs for her work. Will you please let her know the status in that or let   her know once you have had a roomer send it?    Thank you,    Vicki

## 2025-06-26 ENCOUNTER — OFFICE VISIT (OUTPATIENT)
Dept: ALLERGY | Facility: CLINIC | Age: 65
End: 2025-06-26
Payer: COMMERCIAL

## 2025-06-26 ENCOUNTER — DOCUMENTATION ONLY (OUTPATIENT)
Dept: PHYSICAL MEDICINE AND REHAB | Facility: CLINIC | Age: 65
End: 2025-06-26

## 2025-06-26 VITALS — WEIGHT: 204.5 LBS | BODY MASS INDEX: 32.51 KG/M2

## 2025-06-26 DIAGNOSIS — H10.9 RHINOCONJUNCTIVITIS: ICD-10-CM

## 2025-06-26 DIAGNOSIS — J31.0 RHINOCONJUNCTIVITIS: ICD-10-CM

## 2025-06-26 DIAGNOSIS — J45.30 MILD PERSISTENT ASTHMA WITHOUT COMPLICATION: ICD-10-CM

## 2025-06-26 RX ORDER — BUDESONIDE AND FORMOTEROL FUMARATE DIHYDRATE 160; 4.5 UG/1; UG/1
AEROSOL RESPIRATORY (INHALATION)
Qty: 20.4 G | Refills: 11 | Status: SHIPPED | OUTPATIENT
Start: 2025-06-26

## 2025-06-26 RX ORDER — AZELASTINE 1 MG/ML
1-2 SPRAY, METERED NASAL 2 TIMES DAILY
Qty: 30 ML | Refills: 5 | Status: SHIPPED | OUTPATIENT
Start: 2025-06-26

## 2025-06-26 ASSESSMENT — ASTHMA QUESTIONNAIRES
QUESTION_4 LAST FOUR WEEKS HOW OFTEN HAVE YOU USED YOUR RESCUE INHALER OR NEBULIZER MEDICATION (SUCH AS ALBUTEROL): NOT AT ALL
QUESTION_3 LAST FOUR WEEKS HOW OFTEN DID YOUR ASTHMA SYMPTOMS (WHEEZING, COUGHING, SHORTNESS OF BREATH, CHEST TIGHTNESS OR PAIN) WAKE YOU UP AT NIGHT OR EARLIER THAN USUAL IN THE MORNING: NOT AT ALL
ACT_TOTALSCORE: 25
QUESTION_2 LAST FOUR WEEKS HOW OFTEN HAVE YOU HAD SHORTNESS OF BREATH: NOT AT ALL
QUESTION_1 LAST FOUR WEEKS HOW MUCH OF THE TIME DID YOUR ASTHMA KEEP YOU FROM GETTING AS MUCH DONE AT WORK, SCHOOL OR AT HOME: NONE OF THE TIME
QUESTION_5 LAST FOUR WEEKS HOW WOULD YOU RATE YOUR ASTHMA CONTROL: COMPLETELY CONTROLLED

## 2025-06-26 NOTE — PROGRESS NOTES
Vanessa Franco was seen in the Allergy Clinic at [facility]    Vanessa Franco is a 65 year old female  being seen today for ongoing evaluation of mild persistent asthma, allergic rhinitis, tree pollen and weed pollen allergy.     History of Present Illness    Patient reports overall her asthma has been well-controlled over the last 6 months.  She reports she is using Symbicort 2 puffs, twice daily.  She reports she has been pretreating prior to exercise, and does not have any asthma symptoms.  Patient reports she was ill with some type of URI, potentially COVID-19, denies any asthma symptoms.  Denies any appointments regarding asthma exacerbation with PCP/urgent care/ER.  Denies use of oral corticosteroids in the last 6 months since our last appointment.    Patient reports her typical asthma triggers and exacerbations have happened in August.     Patient reports she has had good control of her environmental allergy symptoms with fexofenadine, and azelastine nasal spray.    Asthma Control Test (Act) For People 12 Years And Older    Question 6/26/2025  2:18 PM CDT - Filed by Patient   1. In the past 4 weeks, how much of the time did your asthma keep you from getting as much done at work, school or at home? None of the time   2. During the past 4 weeks, how often have you had shortness of breath? Not at all   3. During the past 4 weeks, how often did your asthma symptoms (wheezing, coughing, shortness of breath, chest tightness or pain) wake you up at night or earlier than usual in the morning? Not at all   4. During the past 4 weeks, how often have you used your rescue inhaler or nebulizer medication (such as albuterol)? Not at all   5. How would you rate your asthma control during the past 4 weeks? Completely controlled   Asthma Control Test Total Score (range: 5 - 25) 25   In the past 12 months, how many times did you visit the emergency room for your asthma without being admitted to the  "hospital? None   In the past 12 months, how many times were you hospitalized overnight because of your asthma? None       Past Medical History:   Diagnosis Date    Anxiety     Chronic urticaria 2020    Environmental allergies     History of cataract surgery     no anestesia issue - BL removed    Hypertension     Mild major depression (H) 2021    no more depression after stopping atenolol       Past Surgical History:   Procedure Laterality Date    ANKLE FRACTURE SURGERY      BREAST SURGERY       SECTION      HYSTERECTOMY, PAP NO LONGER INDICATED      \"She does not need to have Pap smear\"    MAMMOPLASTY REDUCTION Bilateral 1993         Current Outpatient Medications:     albuterol (PROAIR HFA/PROVENTIL HFA/VENTOLIN HFA) 108 (90 Base) MCG/ACT inhaler, Inhale 2 puffs into the lungs every 6 hours as needed for shortness of breath, wheezing or cough, Disp: 18 g, Rfl: 5    azelastine (ASTELIN) 0.1 % nasal spray, Spray 1-2 sprays into both nostrils 2 times daily., Disp: 30 mL, Rfl: 3    budesonide-formoterol (SYMBICORT) 160-4.5 MCG/ACT Inhaler, Inhale 2 puffs twice daily plus 1-2 puffs as needed. May use up to 12 puffs per day., Disp: 20.4 g, Rfl: 5    cyclobenzaprine (FLEXERIL) 10 MG tablet, Take 0.5-1 tablets (5-10 mg) by mouth 3 times daily as needed for muscle spasms, Disp: 30 tablet, Rfl: 0    fexofenadine (ALLEGRA) 180 MG tablet, Take 180 mg by mouth daily, Disp: , Rfl:     FLUoxetine (PROZAC) 10 MG capsule, Take 1 capsule (10 mg) by mouth daily., Disp: 90 capsule, Rfl: 3    hydrochlorothiazide (HYDRODIURIL) 25 MG tablet, Take 1 tablet (25 mg) by mouth daily., Disp: 90 tablet, Rfl: 3    hydrOXYzine HCl (ATARAX) 25 MG tablet, Take 1 tablet (25 mg) by mouth every 8 hours as needed for itching., Disp: 30 tablet, Rfl: 0    latanoprost (XALATAN) 0.005 % ophthalmic solution, INSTILL 1 DROP IN LEFT EYE EVERY DAY AT BEDTIME (Patient not taking: Reported on 2025), Disp: , Rfl:     lisinopril " (ZESTRIL) 40 MG tablet, Take 1 tablet (40 mg) by mouth daily., Disp: 90 tablet, Rfl: 3    methylPREDNISolone (MEDROL DOSEPAK) 4 MG tablet therapy pack, Follow Package Directions, Disp: 21 tablet, Rfl: 0    vitamin B complex with vitamin C (VITAMIN  B COMPLEX) tablet, Take 1 tablet by mouth daily, Disp: , Rfl:   Allergies   Allergen Reactions    Morphine Hives    Sulfa (Sulfonamide Antibiotics) [Sulfa Antibiotics] Hives          Family History   Problem Relation Age of Onset    Dementia Mother     Breast Cancer Mother     Lung Cancer Father        EXAM:   There were no vitals taken for this visit.    Physical Exam  Constitutional:       General: She is not in acute distress.     Appearance: Normal appearance. She is not ill-appearing.   HENT:      Nose: Congestion and rhinorrhea present. No nasal deformity or septal deviation.      Right Turbinates: Not enlarged, swollen or pale.      Left Turbinates: Not enlarged, swollen or pale.      Mouth/Throat:      Mouth: Mucous membranes are moist.      Pharynx: Oropharynx is clear. Uvula midline. No posterior oropharyngeal erythema.      Tonsils: 0 on the right. 0 on the left.   Eyes:      General: No allergic shiner.        Right eye: No discharge.         Left eye: No discharge.      Conjunctiva/sclera: Conjunctivae normal.   Cardiovascular:      Rate and Rhythm: Normal rate and regular rhythm.      Heart sounds: Normal heart sounds, S1 normal and S2 normal.   Pulmonary:      Effort: Pulmonary effort is normal. No prolonged expiration.      Breath sounds: Normal breath sounds. No decreased air movement. No wheezing.   Neurological:      Mental Status: She is alert.   Psychiatric:         Mood and Affect: Mood normal.         Behavior: Behavior normal.         Judgment: Judgment normal.         WORKUP: n/a    ASSESSMENT/PLAN:  Vanessa Franco is a 65 year old female with mild persistent asthma, allergic rhinitis, tree pollen and weed pollen allergy    Mild persistent  asthma  Continue Symbicort 160-4.5 mcg/puff 2 puffs twice daily, 1 to 2 puffs as needed, may use up to 12 puffs/day.  Continue pretreating with Symbicort prior to exercise.    During today's appointment we discussed optimal situation for weaning, patient typically has asthma exacerbations in August.  We will discuss reducing ICS at her next appointment.    Follow-up in 4 months or sooner if needed    Thank you for allowing me to participate in the care of Vanessa Franco.    I spent 23 minutes on the date of the encounter doing chart review, history and exam, documentation and further coordination as noted above exclusive of separately reported interpretations.       Please note that this note consists of symbols derived from keyboarding, dictation and/or voice recognition software. As a result, there may be errors in the script that have gone undetected. Please consider this when interpreting information found in this chart.     SUSANNE Briones [facility]

## 2025-06-26 NOTE — LETTER
6/26/2025      Vanessa Franco  [address] [place] [address]      Dear Colleague,    Thank you for referring your patient, Vanessa Franco, to the [facility]. Please see a copy of my visit note below.    Vanessa Franco was seen in the Allergy Clinic at [facility]    Vanessa Franco is a 65 year old female  being seen today for ongoing evaluation of mild persistent asthma, allergic rhinitis, tree pollen and weed pollen allergy.     History of Present Illness    Patient reports overall her asthma has been well-controlled over the last 6 months.  She reports she is using Symbicort 2 puffs, twice daily.  She reports she has been pretreating prior to exercise, and does not have any asthma symptoms.  Patient reports she was ill with some type of URI, potentially COVID-19, denies any asthma symptoms.  Denies any appointments regarding asthma exacerbation with PCP/urgent care/ER.  Denies use of oral corticosteroids in the last 6 months since our last appointment.    Patient reports her typical asthma triggers and exacerbations have happened in August.     Patient reports she has had good control of her environmental allergy symptoms with fexofenadine, and azelastine nasal spray.    Asthma Control Test (Act) For People 12 Years And Older    Question 6/26/2025  2:18 PM CDT - Filed by Patient   1. In the past 4 weeks, how much of the time did your asthma keep you from getting as much done at work, school or at home? None of the time   2. During the past 4 weeks, how often have you had shortness of breath? Not at all   3. During the past 4 weeks, how often did your asthma symptoms (wheezing, coughing, shortness of breath, chest tightness or pain) wake you up at night or earlier than usual in the morning? Not at all   4. During the past 4 weeks, how often have you used your rescue inhaler or nebulizer medication (such as albuterol)? Not at all   5. How would you rate  "your asthma control during the past 4 weeks? Completely controlled   Asthma Control Test Total Score (range: 5 - 25) 25   In the past 12 months, how many times did you visit the emergency room for your asthma without being admitted to the hospital? None   In the past 12 months, how many times were you hospitalized overnight because of your asthma? None       Past Medical History:   Diagnosis Date     Anxiety      Chronic urticaria 2020     Environmental allergies      History of cataract surgery     no anestesia issue - BL removed     Hypertension      Mild major depression (H) 2021    no more depression after stopping atenolol       Past Surgical History:   Procedure Laterality Date     ANKLE FRACTURE SURGERY       BREAST SURGERY        SECTION       HYSTERECTOMY, PAP NO LONGER INDICATED      \"She does not need to have Pap smear\"     MAMMOPLASTY REDUCTION Bilateral 1993         Current Outpatient Medications:      albuterol (PROAIR HFA/PROVENTIL HFA/VENTOLIN HFA) 108 (90 Base) MCG/ACT inhaler, Inhale 2 puffs into the lungs every 6 hours as needed for shortness of breath, wheezing or cough, Disp: 18 g, Rfl: 5     azelastine (ASTELIN) 0.1 % nasal spray, Spray 1-2 sprays into both nostrils 2 times daily., Disp: 30 mL, Rfl: 3     budesonide-formoterol (SYMBICORT) 160-4.5 MCG/ACT Inhaler, Inhale 2 puffs twice daily plus 1-2 puffs as needed. May use up to 12 puffs per day., Disp: 20.4 g, Rfl: 5     cyclobenzaprine (FLEXERIL) 10 MG tablet, Take 0.5-1 tablets (5-10 mg) by mouth 3 times daily as needed for muscle spasms, Disp: 30 tablet, Rfl: 0     fexofenadine (ALLEGRA) 180 MG tablet, Take 180 mg by mouth daily, Disp: , Rfl:      FLUoxetine (PROZAC) 10 MG capsule, Take 1 capsule (10 mg) by mouth daily., Disp: 90 capsule, Rfl: 3     hydrochlorothiazide (HYDRODIURIL) 25 MG tablet, Take 1 tablet (25 mg) by mouth daily., Disp: 90 tablet, Rfl: 3     hydrOXYzine HCl (ATARAX) 25 MG tablet, Take 1 " tablet (25 mg) by mouth every 8 hours as needed for itching., Disp: 30 tablet, Rfl: 0     latanoprost (XALATAN) 0.005 % ophthalmic solution, INSTILL 1 DROP IN LEFT EYE EVERY DAY AT BEDTIME (Patient not taking: Reported on 6/17/2025), Disp: , Rfl:      lisinopril (ZESTRIL) 40 MG tablet, Take 1 tablet (40 mg) by mouth daily., Disp: 90 tablet, Rfl: 3     methylPREDNISolone (MEDROL DOSEPAK) 4 MG tablet therapy pack, Follow Package Directions, Disp: 21 tablet, Rfl: 0     vitamin B complex with vitamin C (VITAMIN  B COMPLEX) tablet, Take 1 tablet by mouth daily, Disp: , Rfl:   Allergies   Allergen Reactions     Morphine Hives     Sulfa (Sulfonamide Antibiotics) [Sulfa Antibiotics] Hives          Family History   Problem Relation Age of Onset     Dementia Mother      Breast Cancer Mother      Lung Cancer Father        EXAM:   There were no vitals taken for this visit.    Physical Exam  Constitutional:       General: She is not in acute distress.     Appearance: Normal appearance. She is not ill-appearing.   HENT:      Nose: Congestion and rhinorrhea present. No nasal deformity or septal deviation.      Right Turbinates: Not enlarged, swollen or pale.      Left Turbinates: Not enlarged, swollen or pale.      Mouth/Throat:      Mouth: Mucous membranes are moist.      Pharynx: Oropharynx is clear. Uvula midline. No posterior oropharyngeal erythema.      Tonsils: 0 on the right. 0 on the left.   Eyes:      General: No allergic shiner.        Right eye: No discharge.         Left eye: No discharge.      Conjunctiva/sclera: Conjunctivae normal.   Cardiovascular:      Rate and Rhythm: Normal rate and regular rhythm.      Heart sounds: Normal heart sounds, S1 normal and S2 normal.   Pulmonary:      Effort: Pulmonary effort is normal. No prolonged expiration.      Breath sounds: Normal breath sounds. No decreased air movement. No wheezing.   Neurological:      Mental Status: She is alert.   Psychiatric:         Mood and Affect:  Mood normal.         Behavior: Behavior normal.         Judgment: Judgment normal.         WORKUP: n/a    ASSESSMENT/PLAN:  Vanessa Franco is a 65 year old female with mild persistent asthma, allergic rhinitis, tree pollen and weed pollen allergy    Mild persistent asthma  Continue Symbicort 160-4.5 mcg/puff 2 puffs twice daily, 1 to 2 puffs as needed, may use up to 12 puffs/day.  Continue pretreating with Symbicort prior to exercise.    During today's appointment we discussed optimal situation for weaning, patient typically has asthma exacerbations in August.  We will discuss reducing ICS at her next appointment.    Follow-up in 4 months or sooner if needed    Thank you for allowing me to participate in the care of Vanessa Franco.    I spent 23 minutes on the date of the encounter doing chart review, history and exam, documentation and further coordination as noted above exclusive of separately reported interpretations.       Please note that this note consists of symbols derived from keyboarding, dictation and/or voice recognition software. As a result, there may be errors in the script that have gone undetected. Please consider this when interpreting information found in this chart.     Heaven Lombardo PA-C  [facility]    Again, thank you for allowing me to participate in the care of your patient.        Sincerely,        Heaven Lombardo PA-C    Electronically signed

## 2025-06-26 NOTE — PROGRESS NOTES
Patient arrived in clinic to signed off ethan forms. Forms were faxed to [phone]. Original forms given to patient. Copy sent to scanning.   Jennifer Arteaga CMA

## 2025-06-26 NOTE — PATIENT INSTRUCTIONS
[email or web address]-[email or web address]-[email or web address]    [employer] is helping eligible patients pay no more than $35 per month for their inhaled respiratory medicine.    Starting June 1, 2024, eligible* patients will pay no more than $35 per month for all inhaled respiratory medicines. If you have questions about this program or your eligibility, please contact [phone].  *Terms and conditions apply. Government restrictions exclude people enrolled in federal government insurance programs from co-pay support. If you don t meet the terms and conditions and cannot afford your medication, you may be eligible for AZ&Me.    SYMBICORT   (budesonide and formoterol fumarate dihydrate) Inhalation Aerosol

## 2025-07-08 ENCOUNTER — THERAPY VISIT (OUTPATIENT)
Dept: PHYSICAL THERAPY | Facility: REHABILITATION | Age: 65
End: 2025-07-08
Attending: NURSE PRACTITIONER
Payer: COMMERCIAL

## 2025-07-08 DIAGNOSIS — M48.02 CERVICAL STENOSIS OF SPINAL CANAL: ICD-10-CM

## 2025-07-08 DIAGNOSIS — M54.12 CERVICAL RADICULOPATHY: Primary | ICD-10-CM

## 2025-07-08 DIAGNOSIS — M47.812 CERVICAL SPONDYLOSIS WITHOUT MYELOPATHY: ICD-10-CM

## 2025-07-08 DIAGNOSIS — R20.0 NUMBNESS AND TINGLING IN LEFT ARM: ICD-10-CM

## 2025-07-08 DIAGNOSIS — R20.2 NUMBNESS AND TINGLING IN LEFT ARM: ICD-10-CM

## 2025-07-08 PROCEDURE — 97140 MANUAL THERAPY 1/> REGIONS: CPT | Mod: GP

## 2025-07-08 PROCEDURE — 97112 NEUROMUSCULAR REEDUCATION: CPT | Mod: GP

## 2025-07-09 ENCOUNTER — TELEPHONE (OUTPATIENT)
Dept: PHYSICAL MEDICINE AND REHAB | Facility: CLINIC | Age: 65
End: 2025-07-09
Payer: COMMERCIAL

## 2025-07-09 NOTE — TELEPHONE ENCOUNTER
QUIQUEM for pt, letting her know we have not received a new FMLA form as of Monday July 7th. Asked pt If she could please fax it to us at [phone].

## 2025-07-09 NOTE — TELEPHONE ENCOUNTER
[facility] Call Center    Phone Message    May a detailed message be left on voicemail: yes     Reason for Call: Other:       Patient states she faxed a return to work form to the clinic on Monday 07/07/25. She is requesting a call back to confirm the form has been received, Call back #[phone]      Action Taken: Message routed to:  [facility]    Travel Screening: Not Applicable

## 2025-07-09 NOTE — TELEPHONE ENCOUNTER
Pt said that she is not talking about the first form.  She is talking about the second form which is a release.  She said that this still needs to be completed.  Please call her back to discuss.

## 2025-07-09 NOTE — TELEPHONE ENCOUNTER
LVM for pt stating the FMLA from was completed on June 28th . Pt can obtain a copy of the completed from vis My Chart.

## 2025-07-15 ENCOUNTER — TELEPHONE (OUTPATIENT)
Dept: ALLERGY | Facility: CLINIC | Age: 65
End: 2025-07-15
Payer: COMMERCIAL

## 2025-07-15 NOTE — TELEPHONE ENCOUNTER
[facility] Call Center    Phone Message    May a detailed message be left on voicemail: yes     Reason for Call: Other: Pt is requesting a call from the Allergy team. Please call pt back for questions/discussion. Thank you     Inhaler too expensive     Action Taken: TE SENT    Travel Screening: Not Applicable     Date of Service:                        Thank you!  [facility]

## 2025-07-17 NOTE — TELEPHONE ENCOUNTER
[facility] Call Center    Phone Message    May a detailed message be left on voicemail: yes     Reason for Call: Other: Patient returning call. Patient states that the Symbicort inhaler is too expensive and wonders if there is something else that can be prescribed. Patient is at work and may not be able to answer, please leave a detailed VM if no answer.     Action Taken: Message routed to:  Other: Allergy    Travel Screening: Not Applicable

## 2025-07-20 ENCOUNTER — HEALTH MAINTENANCE LETTER (OUTPATIENT)
Age: 65
End: 2025-07-20

## 2025-07-30 ENCOUNTER — PATIENT OUTREACH (OUTPATIENT)
Dept: CARE COORDINATION | Facility: CLINIC | Age: 65
End: 2025-07-30
Payer: COMMERCIAL

## 2025-08-11 ENCOUNTER — HOSPITAL ENCOUNTER (OUTPATIENT)
Facility: AMBULATORY SURGERY CENTER | Age: 65
Discharge: HOME | End: 2025-08-11
Attending: PHYSICAL MEDICINE & REHABILITATION | Admitting: PHYSICAL MEDICINE & REHABILITATION
Payer: COMMERCIAL

## 2025-08-11 ENCOUNTER — ANCILLARY PROCEDURE (OUTPATIENT)
Dept: RADIOLOGY | Facility: AMBULATORY SURGERY CENTER | Age: 65
End: 2025-08-11
Attending: PHYSICAL MEDICINE & REHABILITATION
Payer: COMMERCIAL

## 2025-08-11 VITALS
TEMPERATURE: 98.2 F | HEART RATE: 89 BPM | RESPIRATION RATE: 17 BRPM | OXYGEN SATURATION: 97 % | SYSTOLIC BLOOD PRESSURE: 160 MMHG | HEIGHT: 66 IN | BODY MASS INDEX: 31.34 KG/M2 | DIASTOLIC BLOOD PRESSURE: 101 MMHG | WEIGHT: 195 LBS

## 2025-08-11 DIAGNOSIS — R52 PAIN: ICD-10-CM

## 2025-08-11 PROCEDURE — 62321 NJX INTERLAMINAR CRV/THRC: CPT | Performed by: PHYSICAL MEDICINE & REHABILITATION

## 2025-08-11 PROCEDURE — 62321 NJX INTERLAMINAR CRV/THRC: CPT

## 2025-08-11 RX ORDER — LIDOCAINE HYDROCHLORIDE 10 MG/ML
INJECTION, SOLUTION EPIDURAL; INFILTRATION; INTRACAUDAL; PERINEURAL DAILY PRN
Status: DISCONTINUED | OUTPATIENT
Start: 2025-08-11 | End: 2025-08-11 | Stop reason: HOSPADM

## 2025-08-11 RX ORDER — DEXAMETHASONE SODIUM PHOSPHATE 10 MG/ML
INJECTION, SOLUTION INTRA-ARTICULAR; INTRALESIONAL; INTRAMUSCULAR; INTRAVENOUS; SOFT TISSUE DAILY PRN
Status: DISCONTINUED | OUTPATIENT
Start: 2025-08-11 | End: 2025-08-11 | Stop reason: HOSPADM

## 2025-08-11 RX ORDER — IOPAMIDOL 408 MG/ML
INJECTION, SOLUTION INTRATHECAL DAILY PRN
Status: DISCONTINUED | OUTPATIENT
Start: 2025-08-11 | End: 2025-08-11 | Stop reason: HOSPADM

## 2025-08-12 ENCOUNTER — MYC MEDICAL ADVICE (OUTPATIENT)
Dept: PHYSICAL MEDICINE AND REHAB | Facility: CLINIC | Age: 65
End: 2025-08-12
Payer: COMMERCIAL

## 2025-08-20 ENCOUNTER — VIRTUAL VISIT (OUTPATIENT)
Dept: PHYSICAL MEDICINE AND REHAB | Facility: CLINIC | Age: 65
End: 2025-08-20
Payer: COMMERCIAL

## 2025-08-20 DIAGNOSIS — R52 PAIN: ICD-10-CM

## 2025-08-20 DIAGNOSIS — M54.12 CERVICAL RADICULOPATHY: Primary | ICD-10-CM

## 2025-08-20 DIAGNOSIS — R20.0 NUMBNESS AND TINGLING IN LEFT ARM: ICD-10-CM

## 2025-08-20 DIAGNOSIS — M48.02 CERVICAL STENOSIS OF SPINAL CANAL: ICD-10-CM

## 2025-08-20 DIAGNOSIS — M47.812 CERVICAL SPONDYLOSIS WITHOUT MYELOPATHY: ICD-10-CM

## 2025-08-20 DIAGNOSIS — R20.2 NUMBNESS AND TINGLING IN LEFT ARM: ICD-10-CM

## 2025-08-20 PROCEDURE — 1126F AMNT PAIN NOTED NONE PRSNT: CPT | Mod: 95 | Performed by: NURSE PRACTITIONER

## 2025-08-20 PROCEDURE — 98005 SYNCH AUDIO-VIDEO EST LOW 20: CPT | Performed by: NURSE PRACTITIONER

## (undated) DEVICE — TRAY PAIN INJECTION 97A 640

## (undated) DEVICE — GLOVE PROTEXIS MICRO 7.0 LT BLUE 2D73PM70

## (undated) DEVICE — LINEN TOWEL PACK X5 5464

## (undated) DEVICE — NDL EPIDURAL TUOHY 20GA 3.5" 405028

## (undated) DEVICE — DRSG PRIMAPORE 02X3" 7133

## (undated) DEVICE — PREP CHLORAPREP W/ORANGE TINT 10.5ML 260715

## (undated) DEVICE — SET IV EXT 8IN MICRO POWER INJ 7N8300

## (undated) DEVICE — SYR 07ML EPIDURAL LOSS OF RESISTANCE PULSATOR 4905